# Patient Record
Sex: MALE | Race: WHITE | Employment: FULL TIME | URBAN - METROPOLITAN AREA
[De-identification: names, ages, dates, MRNs, and addresses within clinical notes are randomized per-mention and may not be internally consistent; named-entity substitution may affect disease eponyms.]

---

## 2017-05-31 ENCOUNTER — HOSPITAL ENCOUNTER (EMERGENCY)
Age: 22
Discharge: HOME | End: 2017-05-31
Payer: COMMERCIAL

## 2017-05-31 VITALS
WEIGHT: 193.5 LBS | TEMPERATURE: 98.6 F | SYSTOLIC BLOOD PRESSURE: 154 MMHG | OXYGEN SATURATION: 98 % | DIASTOLIC BLOOD PRESSURE: 87 MMHG | HEIGHT: 67 IN | RESPIRATION RATE: 16 BRPM | BODY MASS INDEX: 30.37 KG/M2 | HEART RATE: 80 BPM
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
## 2017-05-31 DIAGNOSIS — M65.9 TENOSYNOVITIS OF WRIST: Primary | ICD-10-CM

## 2017-05-31 PROCEDURE — 99282 EMERGENCY DEPT VISIT SF MDM: CPT

## 2017-05-31 PROCEDURE — 6370000000 HC RX 637 (ALT 250 FOR IP): Performed by: NURSE PRACTITIONER

## 2017-05-31 RX ORDER — ACETAMINOPHEN 325 MG/1
650 TABLET ORAL EVERY 6 HOURS PRN
Qty: 20 TABLET | Refills: 0 | Status: SHIPPED | OUTPATIENT
Start: 2017-05-31 | End: 2020-10-29 | Stop reason: SDUPTHER

## 2017-05-31 RX ORDER — IBUPROFEN 800 MG/1
800 TABLET ORAL ONCE
Status: COMPLETED | OUTPATIENT
Start: 2017-05-31 | End: 2017-05-31

## 2017-05-31 RX ORDER — IBUPROFEN 800 MG/1
800 TABLET ORAL EVERY 8 HOURS PRN
Qty: 20 TABLET | Refills: 0 | Status: SHIPPED | OUTPATIENT
Start: 2017-05-31 | End: 2017-06-02 | Stop reason: CLARIF

## 2017-05-31 RX ADMIN — IBUPROFEN 800 MG: 800 TABLET, FILM COATED ORAL at 15:58

## 2017-05-31 ASSESSMENT — PAIN SCALES - GENERAL
PAINLEVEL_OUTOF10: 4
PAINLEVEL_OUTOF10: 4

## 2017-05-31 ASSESSMENT — PAIN DESCRIPTION - ORIENTATION: ORIENTATION: RIGHT

## 2017-05-31 ASSESSMENT — PAIN DESCRIPTION - LOCATION: LOCATION: ARM

## 2017-05-31 ASSESSMENT — PAIN DESCRIPTION - DESCRIPTORS: DESCRIPTORS: ACHING

## 2017-05-31 ASSESSMENT — PAIN DESCRIPTION - FREQUENCY: FREQUENCY: CONTINUOUS

## 2017-06-02 ENCOUNTER — APPOINTMENT (OUTPATIENT)
Dept: GENERAL RADIOLOGY | Age: 22
End: 2017-06-02
Payer: COMMERCIAL

## 2017-06-02 ENCOUNTER — HOSPITAL ENCOUNTER (EMERGENCY)
Age: 22
Discharge: HOME | End: 2017-06-02
Payer: COMMERCIAL

## 2017-06-02 VITALS
HEIGHT: 67 IN | HEART RATE: 84 BPM | WEIGHT: 190.92 LBS | TEMPERATURE: 99 F | RESPIRATION RATE: 18 BRPM | DIASTOLIC BLOOD PRESSURE: 86 MMHG | BODY MASS INDEX: 29.97 KG/M2 | SYSTOLIC BLOOD PRESSURE: 157 MMHG | OXYGEN SATURATION: 98 %

## 2017-06-02 DIAGNOSIS — M77.10 LATERAL EPICONDYLITIS  OF ELBOW: Primary | ICD-10-CM

## 2017-06-02 PROCEDURE — 73110 X-RAY EXAM OF WRIST: CPT

## 2017-06-02 PROCEDURE — 73080 X-RAY EXAM OF ELBOW: CPT

## 2017-06-02 PROCEDURE — 99283 EMERGENCY DEPT VISIT LOW MDM: CPT

## 2017-06-02 RX ORDER — NAPROXEN 500 MG/1
500 TABLET ORAL 2 TIMES DAILY WITH MEALS
Qty: 20 TABLET | Refills: 0 | Status: SHIPPED | OUTPATIENT
Start: 2017-06-02

## 2017-06-02 RX ORDER — ACETAMINOPHEN AND CODEINE PHOSPHATE 300; 30 MG/1; MG/1
1-2 TABLET ORAL 3 TIMES DAILY PRN
Qty: 20 TABLET | Refills: 0 | Status: SHIPPED | OUTPATIENT
Start: 2017-06-02 | End: 2018-03-20

## 2017-06-02 ASSESSMENT — PAIN SCALES - GENERAL: PAINLEVEL_OUTOF10: 10

## 2017-06-02 ASSESSMENT — PAIN DESCRIPTION - DESCRIPTORS: DESCRIPTORS: ACHING;THROBBING

## 2017-06-02 ASSESSMENT — PAIN DESCRIPTION - ORIENTATION: ORIENTATION: LEFT;LOWER

## 2017-06-02 ASSESSMENT — PAIN DESCRIPTION - LOCATION: LOCATION: ARM

## 2017-06-02 ASSESSMENT — PAIN DESCRIPTION - PAIN TYPE: TYPE: ACUTE PAIN

## 2017-09-22 ENCOUNTER — OFFICE VISIT (OUTPATIENT)
Dept: FAMILY MEDICINE CLINIC | Age: 22
End: 2017-09-22
Payer: COMMERCIAL

## 2017-09-22 VITALS
HEIGHT: 67 IN | HEART RATE: 88 BPM | WEIGHT: 198 LBS | RESPIRATION RATE: 16 BRPM | SYSTOLIC BLOOD PRESSURE: 152 MMHG | DIASTOLIC BLOOD PRESSURE: 91 MMHG | BODY MASS INDEX: 31.08 KG/M2

## 2017-09-22 DIAGNOSIS — I10 ESSENTIAL HYPERTENSION: ICD-10-CM

## 2017-09-22 DIAGNOSIS — Z00.00 WELL ADULT EXAM: ICD-10-CM

## 2017-09-22 DIAGNOSIS — R51.9 NEW ONSET HEADACHE: Primary | ICD-10-CM

## 2017-09-22 PROCEDURE — 99214 OFFICE O/P EST MOD 30 MIN: CPT | Performed by: FAMILY MEDICINE

## 2017-09-22 RX ORDER — BUTALBITAL AND ACETAMINOPHEN 25; 325 MG/1; MG/1
1 TABLET ORAL EVERY 6 HOURS PRN
Qty: 20 TABLET | Refills: 0 | Status: SHIPPED | OUTPATIENT
Start: 2017-09-22

## 2017-09-22 RX ORDER — VERAPAMIL HYDROCHLORIDE 180 MG/1
180 CAPSULE, EXTENDED RELEASE ORAL NIGHTLY
Qty: 30 CAPSULE | Refills: 0 | Status: SHIPPED | OUTPATIENT
Start: 2017-09-22 | End: 2017-10-25 | Stop reason: SDUPTHER

## 2017-09-22 ASSESSMENT — PATIENT HEALTH QUESTIONNAIRE - PHQ9
2. FEELING DOWN, DEPRESSED OR HOPELESS: 0
SUM OF ALL RESPONSES TO PHQ9 QUESTIONS 1 & 2: 0
SUM OF ALL RESPONSES TO PHQ QUESTIONS 1-9: 0
1. LITTLE INTEREST OR PLEASURE IN DOING THINGS: 0

## 2017-10-11 ENCOUNTER — HOSPITAL ENCOUNTER (OUTPATIENT)
Age: 22
Setting detail: SPECIMEN
Discharge: HOME | End: 2017-10-11
Payer: COMMERCIAL

## 2017-10-11 DIAGNOSIS — I10 ESSENTIAL HYPERTENSION: ICD-10-CM

## 2017-10-11 DIAGNOSIS — R51.9 NEW ONSET HEADACHE: ICD-10-CM

## 2017-10-11 DIAGNOSIS — Z00.00 WELL ADULT EXAM: ICD-10-CM

## 2017-10-11 LAB
ABSOLUTE EOS #: 0.2 K/UL (ref 0–0.4)
ABSOLUTE LYMPH #: 2.3 K/UL (ref 1.2–5.2)
ABSOLUTE MONO #: 0.3 K/UL (ref 0.1–1.4)
ALBUMIN SERPL-MCNC: 4.4 G/DL (ref 3.5–5.2)
ALBUMIN/GLOBULIN RATIO: 1.7 (ref 1–2.5)
ALP BLD-CCNC: 114 U/L (ref 40–129)
ALT SERPL-CCNC: 43 U/L (ref 5–41)
ANION GAP SERPL CALCULATED.3IONS-SCNC: 13 MMOL/L (ref 9–17)
AST SERPL-CCNC: 21 U/L
BASOPHILS # BLD: 1 %
BASOPHILS ABSOLUTE: 0 K/UL (ref 0–0.2)
BILIRUB SERPL-MCNC: 0.69 MG/DL (ref 0.3–1.2)
BUN BLDV-MCNC: 14 MG/DL (ref 6–20)
BUN/CREAT BLD: ABNORMAL (ref 9–20)
CALCIUM SERPL-MCNC: 8.7 MG/DL (ref 8.6–10.4)
CHLORIDE BLD-SCNC: 101 MMOL/L (ref 98–107)
CHOLESTEROL/HDL RATIO: 4.9
CHOLESTEROL: 196 MG/DL
CO2: 25 MMOL/L (ref 20–31)
CREAT SERPL-MCNC: 0.58 MG/DL (ref 0.7–1.2)
DIFFERENTIAL TYPE: NORMAL
EOSINOPHILS RELATIVE PERCENT: 3 %
GFR AFRICAN AMERICAN: >60 ML/MIN
GFR NON-AFRICAN AMERICAN: >60 ML/MIN
GFR SERPL CREATININE-BSD FRML MDRD: ABNORMAL ML/MIN/{1.73_M2}
GFR SERPL CREATININE-BSD FRML MDRD: ABNORMAL ML/MIN/{1.73_M2}
GLUCOSE BLD-MCNC: 82 MG/DL (ref 70–99)
HCT VFR BLD CALC: 48.1 % (ref 41–53)
HDLC SERPL-MCNC: 40 MG/DL
HEMOGLOBIN: 16.4 G/DL (ref 13.5–17.5)
LDL CHOLESTEROL: 128 MG/DL (ref 0–130)
LYMPHOCYTES # BLD: 31 %
MCH RBC QN AUTO: 30.8 PG (ref 26–34)
MCHC RBC AUTO-ENTMCNC: 34.1 G/DL (ref 31–37)
MCV RBC AUTO: 90.4 FL (ref 80–100)
MONOCYTES # BLD: 5 %
PDW BLD-RTO: 13.1 % (ref 12.5–15.4)
PLATELET # BLD: 207 K/UL (ref 140–450)
PLATELET ESTIMATE: NORMAL
PMV BLD AUTO: 8.3 FL (ref 6–12)
POTASSIUM SERPL-SCNC: 4 MMOL/L (ref 3.7–5.3)
RBC # BLD: 5.32 M/UL (ref 4.5–5.9)
RBC # BLD: NORMAL 10*6/UL
SEG NEUTROPHILS: 60 %
SEGMENTED NEUTROPHILS ABSOLUTE COUNT: 4.5 K/UL (ref 1.8–8)
SODIUM BLD-SCNC: 139 MMOL/L (ref 135–144)
THYROXINE, FREE: 1.22 NG/DL (ref 0.93–1.7)
TOTAL PROTEIN: 7 G/DL (ref 6.4–8.3)
TRIGL SERPL-MCNC: 142 MG/DL
TSH SERPL DL<=0.05 MIU/L-ACNC: 3.04 MIU/L (ref 0.3–5)
VLDLC SERPL CALC-MCNC: ABNORMAL MG/DL (ref 1–30)
WBC # BLD: 7.5 K/UL (ref 4.5–13.5)
WBC # BLD: NORMAL 10*3/UL

## 2017-10-13 LAB — THYROID PEROXIDASE (TPO) AB: <10 IU/ML (ref 0–35)

## 2017-10-16 DIAGNOSIS — R51.9 NEW ONSET HEADACHE: ICD-10-CM

## 2018-01-03 ENCOUNTER — TELEPHONE (OUTPATIENT)
Dept: FAMILY MEDICINE CLINIC | Age: 23
End: 2018-01-03

## 2018-01-03 DIAGNOSIS — I10 ESSENTIAL HYPERTENSION: ICD-10-CM

## 2018-01-03 RX ORDER — VERAPAMIL HYDROCHLORIDE 180 MG/1
CAPSULE, EXTENDED RELEASE ORAL
Qty: 30 CAPSULE | Refills: 5 | Status: SHIPPED | OUTPATIENT
Start: 2018-01-03 | End: 2018-03-20 | Stop reason: SDUPTHER

## 2018-03-20 ENCOUNTER — OFFICE VISIT (OUTPATIENT)
Dept: FAMILY MEDICINE CLINIC | Age: 23
End: 2018-03-20
Payer: COMMERCIAL

## 2018-03-20 VITALS
DIASTOLIC BLOOD PRESSURE: 90 MMHG | HEART RATE: 76 BPM | SYSTOLIC BLOOD PRESSURE: 136 MMHG | HEIGHT: 67 IN | RESPIRATION RATE: 16 BRPM | WEIGHT: 199 LBS | BODY MASS INDEX: 31.23 KG/M2

## 2018-03-20 DIAGNOSIS — G43.011 INTRACTABLE MIGRAINE WITHOUT AURA AND WITH STATUS MIGRAINOSUS: ICD-10-CM

## 2018-03-20 DIAGNOSIS — I10 ESSENTIAL HYPERTENSION: Primary | ICD-10-CM

## 2018-03-20 PROCEDURE — 99213 OFFICE O/P EST LOW 20 MIN: CPT | Performed by: FAMILY MEDICINE

## 2018-03-20 RX ORDER — SUMATRIPTAN 100 MG/1
100 TABLET, FILM COATED ORAL
Qty: 9 TABLET | Refills: 3 | Status: SHIPPED | OUTPATIENT
Start: 2018-03-20 | End: 2020-10-14 | Stop reason: SDUPTHER

## 2018-03-20 RX ORDER — VERAPAMIL HYDROCHLORIDE 240 MG/1
CAPSULE, EXTENDED RELEASE ORAL
Qty: 30 CAPSULE | Refills: 5 | Status: SHIPPED | OUTPATIENT
Start: 2018-03-20 | End: 2020-12-30 | Stop reason: SDUPTHER

## 2018-03-20 ASSESSMENT — PATIENT HEALTH QUESTIONNAIRE - PHQ9
1. LITTLE INTEREST OR PLEASURE IN DOING THINGS: 0
2. FEELING DOWN, DEPRESSED OR HOPELESS: 0
SUM OF ALL RESPONSES TO PHQ9 QUESTIONS 1 & 2: 0
SUM OF ALL RESPONSES TO PHQ QUESTIONS 1-9: 0

## 2018-04-24 ENCOUNTER — OFFICE VISIT (OUTPATIENT)
Dept: FAMILY MEDICINE CLINIC | Age: 23
End: 2018-04-24
Payer: COMMERCIAL

## 2018-04-24 VITALS
HEIGHT: 68 IN | RESPIRATION RATE: 16 BRPM | TEMPERATURE: 98.1 F | DIASTOLIC BLOOD PRESSURE: 83 MMHG | BODY MASS INDEX: 30.62 KG/M2 | HEART RATE: 72 BPM | WEIGHT: 202 LBS | SYSTOLIC BLOOD PRESSURE: 135 MMHG

## 2018-04-24 DIAGNOSIS — J06.9 ACUTE URI: Primary | ICD-10-CM

## 2018-04-24 PROCEDURE — 99213 OFFICE O/P EST LOW 20 MIN: CPT | Performed by: FAMILY MEDICINE

## 2018-04-24 RX ORDER — AZITHROMYCIN 250 MG/1
TABLET, FILM COATED ORAL
Qty: 1 PACKET | Refills: 0 | Status: SHIPPED | OUTPATIENT
Start: 2018-04-24

## 2018-04-24 RX ORDER — GUAIFENESIN AND CODEINE PHOSPHATE 100; 10 MG/5ML; MG/5ML
10 SOLUTION ORAL 4 TIMES DAILY PRN
Qty: 400 ML | Refills: 0 | Status: SHIPPED | OUTPATIENT
Start: 2018-04-24 | End: 2018-05-04

## 2018-04-24 ASSESSMENT — PATIENT HEALTH QUESTIONNAIRE - PHQ9
SUM OF ALL RESPONSES TO PHQ9 QUESTIONS 1 & 2: 0
1. LITTLE INTEREST OR PLEASURE IN DOING THINGS: 0
2. FEELING DOWN, DEPRESSED OR HOPELESS: 0
SUM OF ALL RESPONSES TO PHQ QUESTIONS 1-9: 0

## 2018-07-26 ENCOUNTER — OFFICE VISIT (OUTPATIENT)
Dept: FAMILY MEDICINE CLINIC | Age: 23
End: 2018-07-26
Payer: COMMERCIAL

## 2018-07-26 VITALS
SYSTOLIC BLOOD PRESSURE: 125 MMHG | HEIGHT: 68 IN | BODY MASS INDEX: 30.31 KG/M2 | WEIGHT: 200 LBS | HEART RATE: 70 BPM | DIASTOLIC BLOOD PRESSURE: 77 MMHG | RESPIRATION RATE: 16 BRPM

## 2018-07-26 DIAGNOSIS — L50.9 HIVES: Primary | ICD-10-CM

## 2018-07-26 PROCEDURE — 99213 OFFICE O/P EST LOW 20 MIN: CPT | Performed by: FAMILY MEDICINE

## 2018-07-26 RX ORDER — LEVOCETIRIZINE DIHYDROCHLORIDE 5 MG/1
5 TABLET, FILM COATED ORAL DAILY
Qty: 30 TABLET | Refills: 5 | Status: SHIPPED | OUTPATIENT
Start: 2018-07-26 | End: 2019-02-04 | Stop reason: SDUPTHER

## 2018-07-26 RX ORDER — METHYLPREDNISOLONE 4 MG/1
TABLET ORAL
Qty: 1 KIT | Refills: 0 | Status: SHIPPED | OUTPATIENT
Start: 2018-07-26 | End: 2018-08-01

## 2018-07-26 ASSESSMENT — PATIENT HEALTH QUESTIONNAIRE - PHQ9
SUM OF ALL RESPONSES TO PHQ9 QUESTIONS 1 & 2: 0
2. FEELING DOWN, DEPRESSED OR HOPELESS: 0
SUM OF ALL RESPONSES TO PHQ QUESTIONS 1-9: 0
1. LITTLE INTEREST OR PLEASURE IN DOING THINGS: 0

## 2018-07-26 NOTE — PROGRESS NOTES
[facility] PHYSICIANS  COMPREHENSIVE CARE  [facility] [place]  [address] [address]  Dept: [phone]      Sarah Julien is a 26 y.o. male who presents today for follow up on his  medical conditions as noted below. Chief Complaint   Patient presents with    Urticaria     states he has been breaking out in hives randomly throughout the day x's 10 days.  Sinus Problem     c/o sinus pressure. There is no problem list on file for this patient. Past Medical History:   Diagnosis Date    Hay fever     Insomnia       No past surgical history on file. Family History   Problem Relation Age of Onset    Thyroid Disease Mother     High Cholesterol Father     High Blood Pressure Father        Current Outpatient Prescriptions   Medication Sig Dispense Refill    methylPREDNISolone (MEDROL DOSEPACK) 4 MG tablet Take by mouth. 1 kit 0    levocetirizine (XYZAL) 5 MG tablet Take 1 tablet by mouth daily 30 tablet 5    azithromycin (ZITHROMAX) 250 MG tablet 2 po qd day 1,then 1 po qd for 4 days 1 packet 0    verapamil (VERELAN) 240 MG extended release capsule TAKE ONE CAPSULE BY MOUTH NIGHTLY. 30 capsule 5    ALLZITAL  MG TABS Take 1 tablet by mouth every 6 hours as needed (prn headache) 20 tablet 0    naproxen (NAPROSYN) 500 MG tablet Take 1 tablet by mouth 2 times daily (with meals) 20 tablet 0    acetaminophen (TYLENOL) 325 MG tablet Take 2 tablets by mouth every 6 hours as needed for Pain 20 tablet 0    SUMAtriptan (IMITREX) 100 MG tablet Take 1 tablet by mouth once as needed for Migraine 9 tablet 3     No current facility-administered medications for this visit.       ALLERGIES:  No Known Allergies    Social History   Substance Use Topics    Smoking status: Never Smoker    Smokeless tobacco: Never Used    Alcohol use No        LDL Cholesterol (mg/dL)   Date Value   10/11/2017 128     HDL (mg/dL)   Date Value   10/11/2017 40 (L)     BUN (mg/dL)   Date Value   10/11/2017 14 mucosal edema. Mouth/Throat: Oropharynx is clear and moist. No posterior oropharyngeal erythema. Eyes: Conjunctivae and EOM are normal. Pupils are equal, round, and reactive to light. Neck: Normal range of motion. Neck supple. No thyromegaly present. Cardiovascular: Normal rate, regular rhythm and normal heart sounds. No murmur heard. Pulmonary/Chest: Effort normal and breath sounds normal. He has no wheezes. Hehas no rales. Abdominal: Soft. Bowel sounds are normal. He exhibits no distension and no mass. There is no tenderness. There is no rebound and no guarding. Genitourinary/Anorectal:deferred  Musculoskeletal: Normal range of motion. He exhibits no edema or tenderness. Lymphadenopathy: He has no cervical adenopathy. Neurological: He is alert and oriented to person, place, and time. He has normal reflexes. Skin: Skin is warm and dry. No rash noted. Psychiatric: He has a normal mood and affect. His   behavior is normal.       Assessment:      1. Hives          Plan:      Call or return to clinic prn if these symptoms worsen or fail to improve as anticipated. I have reviewed the instructions with the patient, answering all questions to his satisfaction. No Follow-up on file. No orders of the defined types were placed in this encounter. Orders Placed This Encounter   Medications    methylPREDNISolone (MEDROL DOSEPACK) 4 MG tablet     Sig: Take by mouth.      Dispense:  1 kit     Refill:  0    levocetirizine (XYZAL) 5 MG tablet     Sig: Take 1 tablet by mouth daily     Dispense:  30 tablet     Refill:  5      fu if not resolving  Electronically signed by Mat Mclaughlin, DO on 7/26/2018 at 4:02 PM

## 2018-09-27 RX ORDER — VERAPAMIL HYDROCHLORIDE 240 MG/1
TABLET, FILM COATED, EXTENDED RELEASE ORAL
Qty: 30 TABLET | Refills: 1 | Status: SHIPPED | OUTPATIENT
Start: 2018-09-27 | End: 2018-11-05 | Stop reason: SDUPTHER

## 2018-11-05 ENCOUNTER — OFFICE VISIT (OUTPATIENT)
Dept: FAMILY MEDICINE CLINIC | Age: 23
End: 2018-11-05
Payer: COMMERCIAL

## 2018-11-05 VITALS
RESPIRATION RATE: 16 BRPM | WEIGHT: 205 LBS | HEIGHT: 68 IN | DIASTOLIC BLOOD PRESSURE: 83 MMHG | HEART RATE: 74 BPM | BODY MASS INDEX: 31.07 KG/M2 | OXYGEN SATURATION: 95 % | SYSTOLIC BLOOD PRESSURE: 132 MMHG

## 2018-11-05 DIAGNOSIS — I10 ESSENTIAL HYPERTENSION: ICD-10-CM

## 2018-11-05 DIAGNOSIS — M94.0 COSTOCHONDRITIS: Primary | ICD-10-CM

## 2018-11-05 PROCEDURE — 99213 OFFICE O/P EST LOW 20 MIN: CPT | Performed by: FAMILY MEDICINE

## 2018-11-05 RX ORDER — VERAPAMIL HYDROCHLORIDE 240 MG/1
TABLET, FILM COATED, EXTENDED RELEASE ORAL
Qty: 30 TABLET | Refills: 5 | Status: SHIPPED | OUTPATIENT
Start: 2018-11-05 | End: 2021-01-13 | Stop reason: SDUPTHER

## 2018-11-05 ASSESSMENT — PATIENT HEALTH QUESTIONNAIRE - PHQ9
SUM OF ALL RESPONSES TO PHQ QUESTIONS 1-9: 0
2. FEELING DOWN, DEPRESSED OR HOPELESS: 0
1. LITTLE INTEREST OR PLEASURE IN DOING THINGS: 0
SUM OF ALL RESPONSES TO PHQ QUESTIONS 1-9: 0
SUM OF ALL RESPONSES TO PHQ9 QUESTIONS 1 & 2: 0

## 2018-11-05 NOTE — PROGRESS NOTES
[address]  [facility] [address] [facility] [address]  Dept: [phone]      Pasha Raya is a 26 y.o. male who presents today for follow up on his  medical conditions as noted below. Chief Complaint   Patient presents with    Chest Pain     since saturday. pt describes it as \"feels hollow\"       There is no problem list on file for this patient. Past Medical History:   Diagnosis Date    Hay fever     Insomnia       No past surgical history on file. Family History   Problem Relation Age of Onset    Thyroid Disease Mother     High Cholesterol Father     High Blood Pressure Father        Current Outpatient Prescriptions   Medication Sig Dispense Refill    verapamil (CALAN SR) 240 MG extended release tablet TAKE 1 TABLET BY MOUTH AT BEDTIME 30 tablet 5    levocetirizine (XYZAL) 5 MG tablet Take 1 tablet by mouth daily 30 tablet 5    azithromycin (ZITHROMAX) 250 MG tablet 2 po qd day 1,then 1 po qd for 4 days 1 packet 0    verapamil (VERELAN) 240 MG extended release capsule TAKE ONE CAPSULE BY MOUTH NIGHTLY. 30 capsule 5    SUMAtriptan (IMITREX) 100 MG tablet Take 1 tablet by mouth once as needed for Migraine 9 tablet 3    ALLZITAL  MG TABS Take 1 tablet by mouth every 6 hours as needed (prn headache) 20 tablet 0    naproxen (NAPROSYN) 500 MG tablet Take 1 tablet by mouth 2 times daily (with meals) 20 tablet 0    acetaminophen (TYLENOL) 325 MG tablet Take 2 tablets by mouth every 6 hours as needed for Pain 20 tablet 0     No current facility-administered medications for this visit.       ALLERGIES:  No Known Allergies    Social History   Substance Use Topics    Smoking status: Never Smoker    Smokeless tobacco: Never Used    Alcohol use No        LDL Cholesterol (mg/dL)   Date Value   10/11/2017 128     HDL (mg/dL)   Date Value   10/11/2017 40 (L)     BUN (mg/dL)   Date Value   10/11/2017 14     CREATININE (mg/dL)   Date Value   10/11/2017 0.58 (L)     Glucose (mg/dL)   Date Value   10/11/2017 82              Subjective:      HPI  He is here today complaining of having some mild chest discomfort going on since last day or 2 with no shortness of breath or other symptomatology associated with it    Review of Systems:     Constitutional: Negative for fever, appetite change and fatigue. Family social and medical history reviewed and unchanged     HENT: Negative. Negative for nosebleeds, trouble swallowing and neck pain. Eyes: Negative for photophobia and visual disturbance. Respiratory: Negative. Negative for chest tightness and shortness of breath. Cardiovascular: Negative. Negative for chest pain and leg swelling. Gastrointestinal: Negative. Negative for abdominal pain and blood in stool. Endocrine: Negative for cold intolerance and polyuria. Genitourinary: Negative for dysuria and hematuria. Musculoskeletal: Negative. Skin: Negative for rash. Allergic/Immunologic: Negative. Neurological: Negative. Negative for dizziness, weakness and numbness. Hematological: Negative. Negative for adenopathy. Does not bruise/bleed easily. Psychiatric/Behavioral: Negative for sleep disturbance, dysphoric mood and  decreased concentration. The patient is not nervous/anxious. Objective:     Physical Exam:     Nursing note and vitals reviewed. /83   Pulse 74   Resp 16   Ht 5' 8.11\" (1.73 m)   Wt 205 lb (93 kg)   SpO2 95%   BMI 31.07 kg/m²   Constitutional: He is oriented to person, place, and time. He   appears well-developed and well-nourished. HENT:   Head: Normocephalic and atraumatic. Right Ear: External ear normal. Tympanic membrane is not erythematous. No middle ear effusion. Left Ear: External ear normal. Tympanic membrane is not erythematous. No middle ear effusion. Nose: No mucosal edema. Mouth/Throat: Oropharynx is clear and moist. No posterior oropharyngeal erythema.     Eyes: Conjunctivae and EOM are normal. Pupils are equal, round, and reactive to light. Neck: Normal range of motion. Neck supple. No thyromegaly present. Cardiovascular: Normal rate, regular rhythm and normal heart sounds. No murmur heard. Pulmonary/Chest: Effort normal and breath sounds normal. He has no wheezes. Hehas no rales. reproducible to palpation  Abdominal: Soft. Bowel sounds are normal. He exhibits no distension and no mass. There is no tenderness. There is no rebound and no guarding. Genitourinary/Anorectal:deferred  Musculoskeletal: Normal range of motion. He exhibits no edema or tenderness. Lymphadenopathy: He has no cervical adenopathy. Neurological: He is alert and oriented to person, place, and time. He has normal reflexes. Skin: Skin is warm and dry. No rash noted. Psychiatric: He has a normal mood and affect. His   behavior is normal.       Assessment:      1. Costochondritis    2. Essential hypertension          Plan:      Call or return to clinic prn if these symptoms worsen or fail to improve as anticipated. I have reviewed the instructions with the patient, answering all questions to his satisfaction. No Follow-up on file. No orders of the defined types were placed in this encounter.     Orders Placed This Encounter   Medications    verapamil (CALAN SR) 240 MG extended release tablet     Sig: TAKE 1 TABLET BY MOUTH AT BEDTIME     Dispense:  30 tablet     Refill:  5     Ibuprofen  Electronically signed by Emmy Park DO on 11/5/2018 at 5:05 PM

## 2019-02-04 DIAGNOSIS — L50.9 HIVES: ICD-10-CM

## 2019-02-05 RX ORDER — LEVOCETIRIZINE DIHYDROCHLORIDE 5 MG/1
TABLET, FILM COATED ORAL
Qty: 30 TABLET | Refills: 4 | Status: SHIPPED | OUTPATIENT
Start: 2019-02-05 | End: 2019-10-01 | Stop reason: SDUPTHER

## 2019-04-25 ENCOUNTER — OFFICE VISIT (OUTPATIENT)
Dept: FAMILY MEDICINE CLINIC | Age: 24
End: 2019-04-25
Payer: COMMERCIAL

## 2019-04-25 VITALS
SYSTOLIC BLOOD PRESSURE: 142 MMHG | HEART RATE: 91 BPM | HEIGHT: 68 IN | DIASTOLIC BLOOD PRESSURE: 86 MMHG | RESPIRATION RATE: 16 BRPM | WEIGHT: 210 LBS | BODY MASS INDEX: 31.83 KG/M2

## 2019-04-25 DIAGNOSIS — I10 ESSENTIAL HYPERTENSION: Primary | ICD-10-CM

## 2019-04-25 DIAGNOSIS — J30.1 SEASONAL ALLERGIC RHINITIS DUE TO POLLEN: ICD-10-CM

## 2019-04-25 PROCEDURE — 99213 OFFICE O/P EST LOW 20 MIN: CPT | Performed by: FAMILY MEDICINE

## 2019-04-25 RX ORDER — VERAPAMIL HYDROCHLORIDE 240 MG/1
TABLET, FILM COATED, EXTENDED RELEASE ORAL
Qty: 30 TABLET | Refills: 5 | Status: SHIPPED | OUTPATIENT
Start: 2019-04-25 | End: 2019-12-01 | Stop reason: SDUPTHER

## 2019-04-25 ASSESSMENT — PATIENT HEALTH QUESTIONNAIRE - PHQ9
1. LITTLE INTEREST OR PLEASURE IN DOING THINGS: 1
2. FEELING DOWN, DEPRESSED OR HOPELESS: 1
SUM OF ALL RESPONSES TO PHQ9 QUESTIONS 1 & 2: 2
SUM OF ALL RESPONSES TO PHQ QUESTIONS 1-9: 2
SUM OF ALL RESPONSES TO PHQ QUESTIONS 1-9: 2

## 2019-04-25 NOTE — PROGRESS NOTES
[facility]  [address] [place] [id number]  Dept: [phone]      Urbano Smith is a 26 y.o. male who presents today for follow up on his  medical conditions as noted below. Chief Complaint   Patient presents with    Hypertension     he is taking his medications. he states he would qualify for [employer] benefits if he was diagnosed with HTN 12 months after leaving the [employer]. march 28, 2016. about 18months after leaving he was dx with htn. There is no problem list on file for this patient. Past Medical History:   Diagnosis Date    Hay fever     Insomnia       No past surgical history on file. Family History   Problem Relation Age of Onset    Thyroid Disease Mother     High Cholesterol Father     High Blood Pressure Father        Current Outpatient Medications   Medication Sig Dispense Refill    verapamil (CALAN SR) 240 MG extended release tablet TAKE 1 TABLET BY MOUTH EVERYDAY AT BEDTIME 30 tablet 5    levocetirizine (XYZAL) 5 MG tablet TAKE ONE TABLET BY MOUTH DAILY 30 tablet 4    verapamil (CALAN SR) 240 MG extended release tablet TAKE 1 TABLET BY MOUTH AT BEDTIME 30 tablet 5    verapamil (VERELAN) 240 MG extended release capsule TAKE ONE CAPSULE BY MOUTH NIGHTLY. 30 capsule 5    azithromycin (ZITHROMAX) 250 MG tablet 2 po qd day 1,then 1 po qd for 4 days 1 packet 0    SUMAtriptan (IMITREX) 100 MG tablet Take 1 tablet by mouth once as needed for Migraine 9 tablet 3    ALLZITAL  MG TABS Take 1 tablet by mouth every 6 hours as needed (prn headache) 20 tablet 0    naproxen (NAPROSYN) 500 MG tablet Take 1 tablet by mouth 2 times daily (with meals) 20 tablet 0    acetaminophen (TYLENOL) 325 MG tablet Take 2 tablets by mouth every 6 hours as needed for Pain 20 tablet 0     No current facility-administered medications for this visit.       ALLERGIES:  No Known Allergies    Social History     Tobacco Use    Smoking status: Never Smoker    Smokeless tobacco: Never Used   Substance Use Topics    Alcohol use: No        LDL Cholesterol (mg/dL)   Date Value   10/11/2017 128     HDL (mg/dL)   Date Value   10/11/2017 40 (L)     BUN (mg/dL)   Date Value   10/11/2017 14     CREATININE (mg/dL)   Date Value   10/11/2017 0.58 (L)     Glucose (mg/dL)   Date Value   10/11/2017 82              Subjective:      HPI  Here today for follow-up on his high blood pressure he was in the [employer] and his last day in the [employer] was March 26, 2016 he has found out that he qualifies for benefits if he developed the high blood pressure 1 year after leaving the [employer]. He states he has been feeling good as far as his headaches have been concerned since being on blood pressure medication and really hasn't had any his blood pressure is up a little bit today but he rather stressed today in regards to document the situation as well as its finals week  He also is having some frontal pressure and allergy problems he is using some over-the-counter Flonase but has not taken an antihistamine    Review of Systems:     Constitutional: Negative for fever, appetite change and fatigue. Family social and medical history reviewed and unchanged     HENT: Negative. Negative for nosebleeds, trouble swallowing and neck pain. Eyes: Negative for photophobia and visual disturbance. Respiratory: Negative. Negative for chest tightness and shortness of breath. Cardiovascular: Negative. Negative for chest pain and leg swelling. Gastrointestinal: Negative. Negative for abdominal pain and blood in stool. Endocrine: Negative for cold intolerance and polyuria. Genitourinary: Negative for dysuria and hematuria. Musculoskeletal: Negative. Skin: Negative for rash. Allergic/Immunologic: Negative. Neurological: Negative. Negative for dizziness, weakness and numbness. Hematological: Negative. Negative for adenopathy.  Does not bruise/bleed satisfaction. No follow-ups on file. No orders of the defined types were placed in this encounter.     Orders Placed This Encounter   Medications    verapamil (CALAN SR) 240 MG extended release tablet     Sig: TAKE 1 TABLET BY MOUTH EVERYDAY AT BEDTIME     Dispense:  30 tablet     Refill:  5     Seen on current dose of blood pressure medicine at this time try over-the-counter Zyrtec or Claritin follow-up will recheck blood pressure and if he needs a form completed for  I be happy to do so  Electronically signed by Jono Del Castillo DO on 4/25/2019 at 10:55 AM

## 2019-08-07 ENCOUNTER — OFFICE VISIT (OUTPATIENT)
Dept: FAMILY MEDICINE CLINIC | Age: 24
End: 2019-08-07
Payer: COMMERCIAL

## 2019-08-07 VITALS
BODY MASS INDEX: 31.28 KG/M2 | SYSTOLIC BLOOD PRESSURE: 133 MMHG | WEIGHT: 206.4 LBS | HEIGHT: 68 IN | DIASTOLIC BLOOD PRESSURE: 80 MMHG | HEART RATE: 92 BPM | OXYGEN SATURATION: 100 %

## 2019-08-07 DIAGNOSIS — J30.1 SEASONAL ALLERGIC RHINITIS DUE TO POLLEN: Primary | ICD-10-CM

## 2019-08-07 PROCEDURE — 99213 OFFICE O/P EST LOW 20 MIN: CPT | Performed by: FAMILY MEDICINE

## 2019-08-07 PROCEDURE — 96372 THER/PROPH/DIAG INJ SC/IM: CPT | Performed by: FAMILY MEDICINE

## 2019-08-07 RX ORDER — LORATADINE 10 MG/1
10 CAPSULE, LIQUID FILLED ORAL DAILY PRN
COMMUNITY

## 2019-08-07 RX ORDER — METHYLPREDNISOLONE ACETATE 80 MG/ML
80 INJECTION, SUSPENSION INTRA-ARTICULAR; INTRALESIONAL; INTRAMUSCULAR; SOFT TISSUE ONCE
Status: COMPLETED | OUTPATIENT
Start: 2019-08-07 | End: 2019-08-07

## 2019-08-07 RX ADMIN — METHYLPREDNISOLONE ACETATE 80 MG: 80 INJECTION, SUSPENSION INTRA-ARTICULAR; INTRALESIONAL; INTRAMUSCULAR; SOFT TISSUE at 16:21

## 2019-08-07 ASSESSMENT — PATIENT HEALTH QUESTIONNAIRE - PHQ9
1. LITTLE INTEREST OR PLEASURE IN DOING THINGS: 0
2. FEELING DOWN, DEPRESSED OR HOPELESS: 0
SUM OF ALL RESPONSES TO PHQ QUESTIONS 1-9: 0
SUM OF ALL RESPONSES TO PHQ9 QUESTIONS 1 & 2: 0
SUM OF ALL RESPONSES TO PHQ QUESTIONS 1-9: 0

## 2019-08-07 NOTE — PROGRESS NOTES
Last Rate Last Dose    methylPREDNISolone acetate (DEPO-MEDROL) injection 80 mg  80 mg Intramuscular Once Carolyn Hardin,          ALLERGIES:  No Known Allergies    Social History     Tobacco Use    Smoking status: Never Smoker    Smokeless tobacco: Never Used   Substance Use Topics    Alcohol use: No        LDL Cholesterol (mg/dL)   Date Value   10/11/2017 128     HDL (mg/dL)   Date Value   10/11/2017 40 (L)     BUN (mg/dL)   Date Value   10/11/2017 14     CREATININE (mg/dL)   Date Value   10/11/2017 0.58 (L)     Glucose (mg/dL)   Date Value   10/11/2017 82              Subjective:      HPI  He is here today stating having problems with his allergies lots of sinus pressure he has been taking Claritin is not helping a whole lot usually in the past a shot of steroids improves of symptoms    Review of Systems:     Constitutional: Negative for fever, appetite change and fatigue. Family social and medical history reviewed and unchanged     HENT: Negative. Negative for nosebleeds, trouble swallowing and neck pain. Eyes: Negative for photophobia and visual disturbance. Respiratory: Negative. Negative for chest tightness and shortness of breath. Cardiovascular: Negative. Negative for chest pain and leg swelling. Gastrointestinal: Negative. Negative for abdominal pain and blood in stool. Endocrine: Negative for cold intolerance and polyuria. Genitourinary: Negative for dysuria and hematuria. Musculoskeletal: Negative. Skin: Negative for rash. Allergic/Immunologic: Negative. Neurological: Negative. Negative for dizziness, weakness and numbness. Hematological: Negative. Negative for adenopathy. Does not bruise/bleed easily. Psychiatric/Behavioral: Negative for sleep disturbance, dysphoric mood and  decreased concentration. The patient is not nervous/anxious. Objective:     Physical Exam:     Nursing note and vitals reviewed.   /80   Pulse 92   Ht 5' 8\" (1.727 m)

## 2019-10-01 DIAGNOSIS — L50.9 HIVES: ICD-10-CM

## 2019-10-01 RX ORDER — LEVOCETIRIZINE DIHYDROCHLORIDE 5 MG/1
TABLET, FILM COATED ORAL
Qty: 90 TABLET | Refills: 1 | Status: SHIPPED | OUTPATIENT
Start: 2019-10-01 | End: 2020-03-25

## 2019-10-23 ENCOUNTER — HOSPITAL ENCOUNTER (OUTPATIENT)
Age: 24
Discharge: HOME | End: 2019-10-23
Payer: COMMERCIAL

## 2019-10-23 ENCOUNTER — HOSPITAL ENCOUNTER (OUTPATIENT)
Dept: GENERAL RADIOLOGY | Age: 24
Discharge: HOME | End: 2019-10-25
Payer: COMMERCIAL
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
## 2019-10-23 PROCEDURE — 73590 X-RAY EXAM OF LOWER LEG: CPT

## 2019-12-02 RX ORDER — VERAPAMIL HYDROCHLORIDE 240 MG/1
TABLET, FILM COATED, EXTENDED RELEASE ORAL
Qty: 90 TABLET | Refills: 1 | Status: SHIPPED | OUTPATIENT
Start: 2019-12-02 | End: 2020-06-10

## 2020-01-02 ENCOUNTER — OFFICE VISIT (OUTPATIENT)
Dept: FAMILY MEDICINE CLINIC | Age: 25
End: 2020-01-02
Payer: COMMERCIAL

## 2020-01-02 VITALS
HEART RATE: 90 BPM | DIASTOLIC BLOOD PRESSURE: 85 MMHG | WEIGHT: 209.8 LBS | SYSTOLIC BLOOD PRESSURE: 129 MMHG | OXYGEN SATURATION: 96 % | BODY MASS INDEX: 31.9 KG/M2

## 2020-01-02 PROCEDURE — 99213 OFFICE O/P EST LOW 20 MIN: CPT | Performed by: FAMILY MEDICINE

## 2020-01-02 ASSESSMENT — ENCOUNTER SYMPTOMS
VOMITING: 0
WHEEZING: 0
TROUBLE SWALLOWING: 0
NAUSEA: 0
FACIAL SWELLING: 0
BACK PAIN: 0
SINUS PRESSURE: 0
SORE THROAT: 0
EYE DISCHARGE: 0
PHOTOPHOBIA: 0
SHORTNESS OF BREATH: 0
CHEST TIGHTNESS: 0
DIARRHEA: 0
APNEA: 0
ABDOMINAL DISTENTION: 0
EYE PAIN: 0
ABDOMINAL PAIN: 1
ANAL BLEEDING: 0
CONSTIPATION: 0
COLOR CHANGE: 0

## 2020-01-03 NOTE — PROGRESS NOTES
abdominal distention, anal bleeding, constipation, diarrhea, nausea and vomiting. Endocrine: Negative for cold intolerance, heat intolerance, polydipsia, polyphagia and polyuria. Genitourinary: Negative for difficulty urinating, flank pain, frequency and hematuria. Musculoskeletal: Negative for back pain, gait problem and neck pain. Skin: Negative for color change and rash. Neurological: Negative for dizziness, syncope, facial asymmetry, speech difficulty and light-headedness. Hematological: Negative for adenopathy. Psychiatric/Behavioral: Positive for sleep disturbance. Negative for agitation, behavioral problems, confusion, hallucinations and suicidal ideas. The patient is nervous/anxious. The patient is not hyperactive. Objective:     Physical Exam  Vitals signs and nursing note reviewed. Constitutional:       General: He is not in acute distress. Appearance: He is well-developed. HENT:      Head: Normocephalic. Neck:      Musculoskeletal: Normal range of motion and neck supple. Thyroid: No thyromegaly. Cardiovascular:      Rate and Rhythm: Normal rate and regular rhythm. Heart sounds: Normal heart sounds. No murmur. Pulmonary:      Breath sounds: Normal breath sounds. No wheezing or rales. Chest:      Chest wall: No tenderness. Abdominal:      General: Bowel sounds are normal. There is no distension. Palpations: Abdomen is soft. There is no mass. Tenderness: There is tenderness in the right lower quadrant. There is rebound. There is no guarding. Musculoskeletal: Normal range of motion. Lymphadenopathy:      Cervical: No cervical adenopathy. Skin:     General: Skin is warm. Findings: No rash. Neurological:      Mental Status: He is alert and oriented to person, place, and time. Psychiatric:         Mood and Affect: Mood is depressed.        /85   Pulse 90   Wt 209 lb 12.8 oz (95.2 kg)   SpO2 96%   BMI 31.90 kg/m²     Assessment: Diagnosis Orders   1. Essential hypertension controlled    2. Right lower quadrant abdominal pain Suspected acute appendicitis    3. Overweight (BMI 25.0-29. 9)                   Plan:    ER [facility]  Pt said he can drive himself to ER    Return in about 10 days (around 1/12/2020), or with Dr Sonal Sullivan, for follow up. No orders of the defined types were placed in this encounter. Patient given educational materials - see patient instructions. Discussed use, benefit,and side effects of prescribed medications. All patient questions answered. Pt voiced understanding. Reviewed health maintenance. Instructed to continue current medications, diet and exercise. Patient agreed withtreatment plan. Follow up as directed.      Electronically signed by Ning Ribeiro MD on 1/2/2020 at 7:20 PM

## 2020-01-09 ENCOUNTER — TELEPHONE (OUTPATIENT)
Dept: FAMILY MEDICINE CLINIC | Age: 25
End: 2020-01-09

## 2020-01-09 NOTE — TELEPHONE ENCOUNTER
I called patient to see what records he was referring to, no answer left VM to contact office.  I do see records from recent ER visit on 1/2/2020

## 2020-01-13 ENCOUNTER — OFFICE VISIT (OUTPATIENT)
Dept: FAMILY MEDICINE CLINIC | Age: 25
End: 2020-01-13
Payer: COMMERCIAL

## 2020-01-13 VITALS
BODY MASS INDEX: 31.98 KG/M2 | DIASTOLIC BLOOD PRESSURE: 75 MMHG | HEART RATE: 84 BPM | SYSTOLIC BLOOD PRESSURE: 130 MMHG | OXYGEN SATURATION: 97 % | WEIGHT: 211 LBS | HEIGHT: 68 IN

## 2020-01-13 PROCEDURE — 99214 OFFICE O/P EST MOD 30 MIN: CPT | Performed by: FAMILY MEDICINE

## 2020-01-13 RX ORDER — SULFAMETHOXAZOLE AND TRIMETHOPRIM 800; 160 MG/1; MG/1
1 TABLET ORAL 2 TIMES DAILY
Qty: 20 TABLET | Refills: 0 | Status: SHIPPED | OUTPATIENT
Start: 2020-01-13 | End: 2020-01-23

## 2020-01-31 ENCOUNTER — HOSPITAL ENCOUNTER (OUTPATIENT)
Age: 25
Setting detail: SPECIMEN
Discharge: HOME | End: 2020-01-31
Payer: COMMERCIAL

## 2020-02-10 LAB — SURGICAL PATHOLOGY REPORT: NORMAL

## 2020-03-25 RX ORDER — LEVOCETIRIZINE DIHYDROCHLORIDE 5 MG/1
TABLET, FILM COATED ORAL
Qty: 90 TABLET | Refills: 1 | Status: SHIPPED | OUTPATIENT
Start: 2020-03-25 | End: 2020-09-25

## 2020-06-10 RX ORDER — VERAPAMIL HYDROCHLORIDE 240 MG/1
TABLET, FILM COATED, EXTENDED RELEASE ORAL
Qty: 90 TABLET | Refills: 1 | Status: SHIPPED | OUTPATIENT
Start: 2020-06-10 | End: 2020-10-14 | Stop reason: SDUPTHER

## 2020-08-24 ENCOUNTER — OFFICE VISIT (OUTPATIENT)
Dept: FAMILY MEDICINE CLINIC | Age: 25
End: 2020-08-24
Payer: COMMERCIAL

## 2020-08-24 VITALS
HEART RATE: 90 BPM | RESPIRATION RATE: 16 BRPM | BODY MASS INDEX: 32.13 KG/M2 | WEIGHT: 212 LBS | OXYGEN SATURATION: 96 % | DIASTOLIC BLOOD PRESSURE: 81 MMHG | TEMPERATURE: 97.7 F | HEIGHT: 68 IN | SYSTOLIC BLOOD PRESSURE: 129 MMHG

## 2020-08-24 PROCEDURE — 99214 OFFICE O/P EST MOD 30 MIN: CPT | Performed by: FAMILY MEDICINE

## 2020-08-24 RX ORDER — VERAPAMIL HYDROCHLORIDE 360 MG/1
360 CAPSULE, DELAYED RELEASE PELLETS ORAL DAILY
Qty: 90 CAPSULE | Refills: 1 | Status: SHIPPED | OUTPATIENT
Start: 2020-08-24 | End: 2020-12-30 | Stop reason: SDUPTHER

## 2020-08-24 ASSESSMENT — PATIENT HEALTH QUESTIONNAIRE - PHQ9
SUM OF ALL RESPONSES TO PHQ QUESTIONS 1-9: 0
1. LITTLE INTEREST OR PLEASURE IN DOING THINGS: 0
2. FEELING DOWN, DEPRESSED OR HOPELESS: 0
SUM OF ALL RESPONSES TO PHQ9 QUESTIONS 1 & 2: 0
SUM OF ALL RESPONSES TO PHQ QUESTIONS 1-9: 0
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
## 2020-09-25 RX ORDER — LEVOCETIRIZINE DIHYDROCHLORIDE 5 MG/1
TABLET, FILM COATED ORAL
Qty: 90 TABLET | Refills: 1 | Status: SHIPPED | OUTPATIENT
Start: 2020-09-25 | End: 2020-10-14 | Stop reason: SDUPTHER

## 2020-09-25 NOTE — TELEPHONE ENCOUNTER
Vicki Robbins is calling to request a refill on the following medication(s):    Last Visit Date (If Applicable):  5/58/7816    Next Visit Date:    Visit date not found    Medication Request:  Requested Prescriptions     Pending Prescriptions Disp Refills    levocetirizine (XYZAL) 5 MG tablet [Pharmacy Med Name: LEVOCETIRIZINE 5 MG TABLET] 90 tablet 1     Sig: TAKE 1 TABLET BY MOUTH EVERY DAY

## 2020-10-15 RX ORDER — SUMATRIPTAN 100 MG/1
100 TABLET, FILM COATED ORAL
Qty: 9 TABLET | Refills: 3 | Status: SHIPPED | OUTPATIENT
Start: 2020-10-15 | End: 2021-03-10

## 2020-10-15 RX ORDER — VERAPAMIL HYDROCHLORIDE 240 MG/1
TABLET, FILM COATED, EXTENDED RELEASE ORAL
Qty: 90 TABLET | Refills: 1 | Status: SHIPPED | OUTPATIENT
Start: 2020-10-15 | End: 2020-12-30 | Stop reason: SDUPTHER

## 2020-10-15 RX ORDER — LEVOCETIRIZINE DIHYDROCHLORIDE 5 MG/1
5 TABLET, FILM COATED ORAL NIGHTLY
Qty: 90 TABLET | Refills: 1 | Status: SHIPPED | OUTPATIENT
Start: 2020-10-15 | End: 2021-07-23 | Stop reason: SDUPTHER

## 2020-10-15 NOTE — TELEPHONE ENCOUNTER
[facility] Staff is calling to request a refill on the following medication(s):    Last Visit Date (If Applicable):  5/25/3956    Next Visit Date:    Visit date not found    Medication Request:  Requested Prescriptions     Pending Prescriptions Disp Refills    SUMAtriptan (IMITREX) 100 MG tablet 9 tablet 3     Sig: Take 1 tablet by mouth once as needed for Migraine    verapamil (CALAN SR) 240 MG extended release tablet 90 tablet 1    levocetirizine (XYZAL) 5 MG tablet 90 tablet 1

## 2020-10-29 ENCOUNTER — APPOINTMENT (OUTPATIENT)
Dept: GENERAL RADIOLOGY | Age: 25
End: 2020-10-29
Payer: COMMERCIAL

## 2020-10-29 ENCOUNTER — APPOINTMENT (OUTPATIENT)
Dept: CT IMAGING | Age: 25
End: 2020-10-29
Payer: COMMERCIAL

## 2020-10-29 ENCOUNTER — HOSPITAL ENCOUNTER (EMERGENCY)
Age: 25
Discharge: HOME | End: 2020-10-29
Attending: EMERGENCY MEDICINE
Payer: COMMERCIAL

## 2020-10-29 VITALS
DIASTOLIC BLOOD PRESSURE: 96 MMHG | OXYGEN SATURATION: 98 % | HEIGHT: 68 IN | WEIGHT: 210 LBS | RESPIRATION RATE: 16 BRPM | SYSTOLIC BLOOD PRESSURE: 133 MMHG | TEMPERATURE: 97.5 F | HEART RATE: 96 BPM | BODY MASS INDEX: 31.83 KG/M2

## 2020-10-29 LAB
ABSOLUTE EOS #: 0.1 K/UL (ref 0–0.4)
ABSOLUTE IMMATURE GRANULOCYTE: NORMAL K/UL (ref 0–0.3)
ABSOLUTE LYMPH #: 2.2 K/UL (ref 1–4.8)
ABSOLUTE MONO #: 0.4 K/UL (ref 0.1–1.3)
AMPHETAMINE SCREEN URINE: NEGATIVE
ANION GAP SERPL CALCULATED.3IONS-SCNC: 10 MMOL/L (ref 9–17)
BARBITURATE SCREEN URINE: NEGATIVE
BASOPHILS # BLD: 1 % (ref 0–2)
BASOPHILS ABSOLUTE: 0 K/UL (ref 0–0.2)
BENZODIAZEPINE SCREEN, URINE: NEGATIVE
BUN BLDV-MCNC: 17 MG/DL (ref 6–20)
BUN/CREAT BLD: ABNORMAL (ref 9–20)
BUPRENORPHINE URINE: NORMAL
CALCIUM SERPL-MCNC: 8.9 MG/DL (ref 8.6–10.4)
CANNABINOID SCREEN URINE: NEGATIVE
CHLORIDE BLD-SCNC: 104 MMOL/L (ref 98–107)
CO2: 25 MMOL/L (ref 20–31)
COCAINE METABOLITE, URINE: NEGATIVE
CREAT SERPL-MCNC: 0.62 MG/DL (ref 0.7–1.2)
DIFFERENTIAL TYPE: NORMAL
EOSINOPHILS RELATIVE PERCENT: 1 % (ref 0–4)
GFR AFRICAN AMERICAN: >60 ML/MIN
GFR NON-AFRICAN AMERICAN: >60 ML/MIN
GFR SERPL CREATININE-BSD FRML MDRD: ABNORMAL ML/MIN/{1.73_M2}
GFR SERPL CREATININE-BSD FRML MDRD: ABNORMAL ML/MIN/{1.73_M2}
GLUCOSE BLD-MCNC: 90 MG/DL (ref 70–99)
HCT VFR BLD CALC: 46.5 % (ref 41–53)
HEMOGLOBIN: 16.4 G/DL (ref 13.5–17.5)
IMMATURE GRANULOCYTES: NORMAL %
LYMPHOCYTES # BLD: 30 % (ref 24–44)
MCH RBC QN AUTO: 31.9 PG (ref 26–34)
MCHC RBC AUTO-ENTMCNC: 35.3 G/DL (ref 31–37)
MCV RBC AUTO: 90.4 FL (ref 80–100)
MDMA URINE: NORMAL
METHADONE SCREEN, URINE: NEGATIVE
METHAMPHETAMINE, URINE: NORMAL
MONOCYTES # BLD: 5 % (ref 1–7)
NRBC AUTOMATED: NORMAL PER 100 WBC
OPIATES, URINE: NEGATIVE
OXYCODONE SCREEN URINE: NEGATIVE
PDW BLD-RTO: 12.6 % (ref 11.5–14.9)
PHENCYCLIDINE, URINE: NEGATIVE
PLATELET # BLD: 235 K/UL (ref 150–450)
PLATELET ESTIMATE: NORMAL
PMV BLD AUTO: 7.7 FL (ref 6–12)
POTASSIUM SERPL-SCNC: 4.2 MMOL/L (ref 3.7–5.3)
PROPOXYPHENE, URINE: NORMAL
RBC # BLD: 5.14 M/UL (ref 4.5–5.9)
RBC # BLD: NORMAL 10*6/UL
SEG NEUTROPHILS: 63 % (ref 36–66)
SEGMENTED NEUTROPHILS ABSOLUTE COUNT: 4.7 K/UL (ref 1.3–9.1)
SODIUM BLD-SCNC: 139 MMOL/L (ref 135–144)
TEST INFORMATION: NORMAL
TRICYCLIC ANTIDEPRESSANTS, UR: NORMAL
TROPONIN INTERP: NORMAL
TROPONIN T: NORMAL NG/ML
TROPONIN, HIGH SENSITIVITY: <6 NG/L (ref 0–22)
WBC # BLD: 7.4 K/UL (ref 3.5–11)
WBC # BLD: NORMAL 10*3/UL

## 2020-10-29 PROCEDURE — 80307 DRUG TEST PRSMV CHEM ANLYZR: CPT

## 2020-10-29 PROCEDURE — 80048 BASIC METABOLIC PNL TOTAL CA: CPT

## 2020-10-29 PROCEDURE — 70450 CT HEAD/BRAIN W/O DYE: CPT

## 2020-10-29 PROCEDURE — 73130 X-RAY EXAM OF HAND: CPT

## 2020-10-29 PROCEDURE — 84484 ASSAY OF TROPONIN QUANT: CPT

## 2020-10-29 PROCEDURE — 2580000003 HC RX 258: Performed by: STUDENT IN AN ORGANIZED HEALTH CARE EDUCATION/TRAINING PROGRAM

## 2020-10-29 PROCEDURE — 71046 X-RAY EXAM CHEST 2 VIEWS: CPT

## 2020-10-29 PROCEDURE — 93005 ELECTROCARDIOGRAM TRACING: CPT | Performed by: STUDENT IN AN ORGANIZED HEALTH CARE EDUCATION/TRAINING PROGRAM

## 2020-10-29 PROCEDURE — 6370000000 HC RX 637 (ALT 250 FOR IP): Performed by: STUDENT IN AN ORGANIZED HEALTH CARE EDUCATION/TRAINING PROGRAM

## 2020-10-29 PROCEDURE — 72125 CT NECK SPINE W/O DYE: CPT

## 2020-10-29 PROCEDURE — 36415 COLL VENOUS BLD VENIPUNCTURE: CPT

## 2020-10-29 PROCEDURE — 99284 EMERGENCY DEPT VISIT MOD MDM: CPT

## 2020-10-29 PROCEDURE — 85025 COMPLETE CBC W/AUTO DIFF WBC: CPT

## 2020-10-29 PROCEDURE — 73562 X-RAY EXAM OF KNEE 3: CPT

## 2020-10-29 RX ORDER — 0.9 % SODIUM CHLORIDE 0.9 %
1000 INTRAVENOUS SOLUTION INTRAVENOUS ONCE
Status: COMPLETED | OUTPATIENT
Start: 2020-10-29 | End: 2020-10-29

## 2020-10-29 RX ORDER — ACETAMINOPHEN 500 MG
1000 TABLET ORAL ONCE
Status: COMPLETED | OUTPATIENT
Start: 2020-10-29 | End: 2020-10-29

## 2020-10-29 RX ORDER — IBUPROFEN 600 MG/1
600 TABLET ORAL EVERY 6 HOURS PRN
Qty: 30 TABLET | Refills: 0 | Status: SHIPPED | OUTPATIENT
Start: 2020-10-29

## 2020-10-29 RX ORDER — ACETAMINOPHEN 325 MG/1
650 TABLET ORAL EVERY 6 HOURS PRN
Qty: 20 TABLET | Refills: 0 | Status: SHIPPED | OUTPATIENT
Start: 2020-10-29

## 2020-10-29 RX ORDER — ONDANSETRON 4 MG/1
4 TABLET, ORALLY DISINTEGRATING ORAL ONCE
Status: COMPLETED | OUTPATIENT
Start: 2020-10-29 | End: 2020-10-29

## 2020-10-29 RX ADMIN — SODIUM CHLORIDE 1000 ML: 9 INJECTION, SOLUTION INTRAVENOUS at 15:58

## 2020-10-29 RX ADMIN — ONDANSETRON 4 MG: 4 TABLET, ORALLY DISINTEGRATING ORAL at 15:58

## 2020-10-29 RX ADMIN — ACETAMINOPHEN 1000 MG: 500 TABLET, FILM COATED ORAL at 15:58

## 2020-10-29 ASSESSMENT — PAIN SCALES - GENERAL
PAINLEVEL_OUTOF10: 5
PAINLEVEL_OUTOF10: 5

## 2020-10-29 ASSESSMENT — PAIN DESCRIPTION - DESCRIPTORS: DESCRIPTORS: ACHING

## 2020-10-29 ASSESSMENT — PAIN DESCRIPTION - LOCATION: LOCATION: ABDOMEN

## 2020-10-29 ASSESSMENT — PAIN DESCRIPTION - PAIN TYPE: TYPE: ACUTE PAIN
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
## 2020-10-29 NOTE — ED NOTES
Daina from [facility] coming in to do testing. Pt is in ER waiting room lobby and understands he needs to wait for her to come in.       Sony Simmons, [address] [facility]  10/29/20 [id number]

## 2020-11-01 LAB
EKG ATRIAL RATE: 66 BPM
EKG P AXIS: 34 DEGREES
EKG P-R INTERVAL: 178 MS
EKG Q-T INTERVAL: 366 MS
EKG QRS DURATION: 92 MS
EKG QTC CALCULATION (BAZETT): 383 MS
EKG R AXIS: 46 DEGREES
EKG T AXIS: 21 DEGREES
EKG VENTRICULAR RATE: 66 BPM

## 2020-11-01 PROCEDURE — 93010 ELECTROCARDIOGRAM REPORT: CPT | Performed by: INTERNAL MEDICINE

## 2020-11-04 ENCOUNTER — HOSPITAL ENCOUNTER (OUTPATIENT)
Dept: GENERAL RADIOLOGY | Age: 25
Discharge: HOME | End: 2020-11-06
Payer: COMMERCIAL

## 2020-11-04 ENCOUNTER — HOSPITAL ENCOUNTER (OUTPATIENT)
Age: 25
Discharge: HOME | End: 2020-11-04
Payer: COMMERCIAL

## 2020-11-04 PROCEDURE — 71046 X-RAY EXAM CHEST 2 VIEWS: CPT

## 2020-11-04 PROCEDURE — 74018 RADEX ABDOMEN 1 VIEW: CPT

## 2020-11-16 ENCOUNTER — HOSPITAL ENCOUNTER (OUTPATIENT)
Dept: PHYSICAL THERAPY | Facility: CLINIC | Age: 25
Setting detail: THERAPIES SERIES
Discharge: HOME | End: 2020-11-16
Payer: COMMERCIAL

## 2020-11-16 PROCEDURE — 97161 PT EVAL LOW COMPLEX 20 MIN: CPT

## 2020-11-16 PROCEDURE — 97110 THERAPEUTIC EXERCISES: CPT
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
## 2020-11-17 ENCOUNTER — HOSPITAL ENCOUNTER (OUTPATIENT)
Dept: PHYSICAL THERAPY | Facility: CLINIC | Age: 25
Setting detail: THERAPIES SERIES
Discharge: HOME | End: 2020-11-17
Payer: COMMERCIAL

## 2020-11-17 PROCEDURE — 97530 THERAPEUTIC ACTIVITIES: CPT

## 2020-11-17 PROCEDURE — 97110 THERAPEUTIC EXERCISES: CPT

## 2020-11-17 NOTE — FLOWSHEET NOTE
[] [facility] [facility]  [address]  [phone]  F: [phone] [] [address]  [facility] [address]   [address]  P: [phone]  F: [phone] [] [place]  [address] [facility]  P: [phone]  F: [phone] [x] [address]  P: [phone]  F: [phone] [] [address]  [facility]   [address]   [place]: [phone]  F: [phone]      Physical Therapy Daily Treatment Note    Date:  2020  Patient Name:  Bety Jack    :  1995  MRN: [id number]  Physician: Cathy Varner                    Insurance: [facility]: x3 per week, x10 visits- PRESUMPTIVE   Medical Diagnosis: S06.0X0A; S13. 4XXA; S16. 1XXA; I20.332P; S67.53QV; S33. 5XXA; I93.105R; H37.986Z; S30. 1XXA             Rehab Codes: S06.0X0A; S13. 4XXA; S16. 1XXA; M73.009X; G13.45XD; S33. 5XXA; M36.476P; O11.211Q; S30. 1XXA  Onset Date: 10/29/2020                    Next 's appt.: 2020  Visit# / total visits: 2/10     Cancels/No Shows: 0/0    Subjective:    Pain:  [x] Yes  [] No Location: cervical spine Pain Rating: (0-10 scale) 4/10  Pain altered Tx:  [x] No  [] Yes  Action: N/A  Comments: Reports gradual inc dizziness post initial evaluation- which remained most of yesterday- and lingers into today. Reports no change in cervical spine pain with performing HEP interventions last PM.     At present:  Dizziness /10 [7-8/10 dizziness last PM]   Cervical spine pain 2/10     However, later in evaluation reveals current dizziness:  \"Comes and goes in waves. \"  However, inc with bending forward. Spinning- but don't see the room spinning. Remains for several hours. No positions or activities give it relief.      \"When I turn quickly; extend my arms above my Torsion    Test Position Pain/Symptoms Presence of Catch-up Saccades   (R) Slight inc in dizziness  None   (L) None  None     No change in baseline dizziness during L testing- remains 4/10. Chair Rotation Test    Test Position Result Presence of Nystagmus Indication   Condition #1- Cervical Rotation (Bias Cervical) A None Sig inc dizziness- ceased test d/t pt subjective report    Condition #2- Full Body Rotation (Bias Vestibular System) N None None     Cervical Kinesthetic Sense Test    Motion Trial 1 Trial 2 Trial 3 Average Score   R rotation A A A A   L rotation A A A A   Flexion A 6 6 A   Extension A A 4.5 A     Specific Instructions for next treatment: Assess BPPV. Review HEP interventions; and assess supine chin-tuck with head-lift and prone cervical extension strength testing next visit. Begin combination cervical spine treatment and cervicogenic dizziness treatment. Treatment Charges: Mins Time Units   []  Modalities      [x]  Ther Exercise 10 3:45-3:55PM 1   []  Manual Therapy      [x]  Ther Activities 40 3:05-3:45PM 2   []  Aquatics      []  Vasocompression      []  Other      Total Treatment time 50 3:05-3:55PM 3     Assessment: [] Progressing toward goals. [] No change. [x] Other: Pt presents with MIN cervical spine pain and dizziness, however, reports poor tolerance to initial evaluation- gradual inc in dizziness yesterday, which remains into today. Therefore, performed vestibular screen and assessed PCSS for post-concussive symptoms. Pt's oculomotor screen- with and without fixation- is unremarkable. However, reports dizziness with tests which involve head/neck motion. Therefore, proceeded with cervicogenic test battery- demos sig dizziness with chair rotation test. Pt's dizziness most likely secondary to cervicogenic dizziness and concurrent cervical spine pain and limitation. Therefore, will begin combo treatment for both next visit. Consider ideas above and below next visit.    [x] Patient would continue to benefit from skilled physical therapy services in order to: inc cervical spine AROM; inc B shoulder AROM without inc symptoms; inc cervical and postural strength; dec dizziness; and overall improve tolerance for bending forward, sleeping, driving, and returning to work. STG: (to be met in 6 treatments)  1. ? Pain: Patient will report < 4/10 pain at rest and < 6/10 pain with active movement in order to improve QOL. 2. ? ROM: Will demo all cervical AROM with < 4/10 P at end-ranges; and also inc cervical rotation AROM B to 80 deg for improved functional mobility, driving. No inc cervical spine pain or dizziness with B shoulder AROM. 3. ? Strength: Will demo 4/5 R shoulder IR/ER strength in order to better match L and improve ADLs. Also seated cervical retractions x20 reps without inc pain in order to prevent future stretch weakness while sitting/driving at work. 4. ? Function: Patient will report decreased TTP to low cervical spinous processes, B SIERRA/LS in order to indicate decreased inflammation and improved healing of injured site. 5. Patient to be independent with home exercise program as demonstrated by performance with correct form without cues. LTG: (to be met in 10 treatments)  1. Will report improved sleeping tolerance without use of pain patches. 2. Will report < 4/10 P with cervical rotation for driving for improved return to work. 3. Improve NDI to 25%     Vestibular goals:  Problems:                                                                                     [x]  1. Vertigo  []  2. Difficulty walking a straight course                                    []  3. Positive [place] Hallpike                                               []  4.  Static balance deficit: mCTSIB  []  5. Dynamic balance deficit: Saxena Balance Scale (BBS), Dynamic Gait Index (DGI), Functional Gait Assessment (FGA)  [x]  6. Increased Dizziness Handicap Inventory [facility])   [x]  7.   Increased Post Concussion Symptom Survey (PCSS)  []  8. Increased Brain Injury Vision Symptom Survey (BIVSS)      Short Term Goals: (    6         Treatments)  [x] 1. Decreased Vertigo  [] 2. Improved gait mechanics, trajectory  [] 3. Negative Joseph Hallpike  [] 4. Improved static balance  [] 5. Improved dynamic balance  [x] 6. Decreased Dizziness Handicap Inventory ([facility] [address])- < 44/100  [x] 7. Decreased Post Concussion Symptom Survey (PCSS)- < 59/100  [] 8. Decreased Brain Injury Vision Symptom Survey (BIVSS)  [x] 9. Independent with Home Exercise Program (HEP)  [] 10. Demonstrate knowledge of fall prevention    Long Term Goals: (     10         Treatments)  [x] 1. Will report dec or deny symptoms with bending forward, turning his head quickly, and extending his arms above his head in order to improve QOL and work tolerance.         Patient goals: none listed. Pt. Education:  [x] Yes  [] No  [] Reviewed Prior HEP/Ed  Method of Education: [x] Verbal  [] Demo  [] Written  Comprehension of Education:  [x] Verbalizes understanding. [] Demonstrates understanding. [] Needs review. [] Demonstrates/verbalizes HEP/Ed previously given. 11/17/2020- Educated per grid above; also potential dizziness secondary to cervicogenic dizziness per results of vestibular screen above- verbalized understanding to all. Plan: [x] Continue current frequency toward long and short term goals. [x] Specific Instructions for subsequent treatments: Assess BPPV. Review HEP interventions; and assess supine chin-tuck with head-lift and prone cervical extension strength testing next visit. Begin combination cervical spine treatment and cervicogenic dizziness treatment.       Time In: 3:05PM            Time Out: 3:55PM    Electronically signed by:  Marlen Erickson, PT

## 2020-11-19 ENCOUNTER — HOSPITAL ENCOUNTER (OUTPATIENT)
Dept: PHYSICAL THERAPY | Facility: CLINIC | Age: 25
Setting detail: THERAPIES SERIES
Discharge: HOME | End: 2020-11-19
Payer: COMMERCIAL

## 2020-11-19 PROCEDURE — 97110 THERAPEUTIC EXERCISES: CPT

## 2020-11-19 PROCEDURE — 97530 THERAPEUTIC ACTIVITIES: CPT
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
## 2020-11-23 ENCOUNTER — HOSPITAL ENCOUNTER (OUTPATIENT)
Dept: PHYSICAL THERAPY | Facility: CLINIC | Age: 25
Setting detail: THERAPIES SERIES
Discharge: HOME | End: 2020-11-23
Payer: COMMERCIAL

## 2020-11-23 PROCEDURE — 97110 THERAPEUTIC EXERCISES: CPT

## 2020-11-23 PROCEDURE — 97530 THERAPEUTIC ACTIVITIES: CPT
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
## 2020-11-24 ENCOUNTER — HOSPITAL ENCOUNTER (OUTPATIENT)
Dept: PHYSICAL THERAPY | Facility: CLINIC | Age: 25
Setting detail: THERAPIES SERIES
Discharge: HOME | End: 2020-11-24
Payer: COMMERCIAL

## 2020-11-24 PROCEDURE — 97110 THERAPEUTIC EXERCISES: CPT

## 2020-11-24 PROCEDURE — 97530 THERAPEUTIC ACTIVITIES: CPT
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
## 2020-11-30 ENCOUNTER — HOSPITAL ENCOUNTER (OUTPATIENT)
Dept: PHYSICAL THERAPY | Facility: CLINIC | Age: 25
Setting detail: THERAPIES SERIES
Discharge: HOME | End: 2020-11-30
Payer: COMMERCIAL

## 2020-11-30 PROCEDURE — 97140 MANUAL THERAPY 1/> REGIONS: CPT

## 2020-11-30 PROCEDURE — 97110 THERAPEUTIC EXERCISES: CPT
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
## 2020-12-01 ENCOUNTER — HOSPITAL ENCOUNTER (OUTPATIENT)
Dept: PHYSICAL THERAPY | Facility: CLINIC | Age: 25
Setting detail: THERAPIES SERIES
Discharge: HOME | End: 2020-12-01
Payer: COMMERCIAL

## 2020-12-01 PROCEDURE — 97016 VASOPNEUMATIC DEVICE THERAPY: CPT

## 2020-12-01 PROCEDURE — 97110 THERAPEUTIC EXERCISES: CPT

## 2020-12-01 PROCEDURE — 97140 MANUAL THERAPY 1/> REGIONS: CPT
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
## 2020-12-03 ENCOUNTER — HOSPITAL ENCOUNTER (OUTPATIENT)
Dept: PHYSICAL THERAPY | Facility: CLINIC | Age: 25
Setting detail: THERAPIES SERIES
Discharge: HOME | End: 2020-12-03
Payer: COMMERCIAL

## 2020-12-03 PROCEDURE — 97112 NEUROMUSCULAR REEDUCATION: CPT

## 2020-12-03 PROCEDURE — 97110 THERAPEUTIC EXERCISES: CPT

## 2020-12-03 PROCEDURE — 97016 VASOPNEUMATIC DEVICE THERAPY: CPT

## 2020-12-03 PROCEDURE — 97140 MANUAL THERAPY 1/> REGIONS: CPT
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
## 2020-12-07 ENCOUNTER — APPOINTMENT (OUTPATIENT)
Dept: PHYSICAL THERAPY | Facility: CLINIC | Age: 25
End: 2020-12-07
Payer: COMMERCIAL

## 2020-12-07 ENCOUNTER — HOSPITAL ENCOUNTER (OUTPATIENT)
Dept: PHYSICAL THERAPY | Facility: CLINIC | Age: 25
Setting detail: THERAPIES SERIES
Discharge: HOME | End: 2020-12-07
Payer: COMMERCIAL

## 2020-12-07 PROCEDURE — 97112 NEUROMUSCULAR REEDUCATION: CPT

## 2020-12-07 PROCEDURE — 97110 THERAPEUTIC EXERCISES: CPT
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
## 2020-12-08 ENCOUNTER — HOSPITAL ENCOUNTER (OUTPATIENT)
Dept: PHYSICAL THERAPY | Facility: CLINIC | Age: 25
Setting detail: THERAPIES SERIES
Discharge: HOME | End: 2020-12-08
Payer: COMMERCIAL

## 2020-12-08 PROCEDURE — 97112 NEUROMUSCULAR REEDUCATION: CPT

## 2020-12-08 PROCEDURE — 97140 MANUAL THERAPY 1/> REGIONS: CPT

## 2020-12-08 PROCEDURE — 97110 THERAPEUTIC EXERCISES: CPT

## 2020-12-08 PROCEDURE — 97016 VASOPNEUMATIC DEVICE THERAPY: CPT

## 2020-12-08 NOTE — FLOWSHEET NOTE
major complaint cervical and thoracic pain, dizziness  Exercises:   Exercise Reps/ Time Weight/ Level Comments           R wrist flexion str. 3x30\" ea  GENTLE OP; following MT techniques  x   Seated putty gripping- full, lumbrical, thumb only, thumb flexion    Also resisted finger extension against rubberband- at all DIP, PIP joints of R hand  2x10 ea  Medium putty- 11/30/2020  medium putty- for thumb only secondary to inc pain  - 12/8/20    Thick rubber-band      HEP- reviewed  x   Gripper  3x10, 5#, green  Hand  and finger   x   Individual digit gripper  2x10 5#, green   x   Supination/pronation  20x   x   Flex/ext 20x   x   UD/RD 20x   x                                   Other:   MT, then active interventions, then finish with ice vaso     MT: STM to R extensor musculature, held hawkgrips d/t poor chiquita last visit. Treatment Charges: Mins Time Units   []  Modalities- ice massage       [x]  Ther Exercise 18 12:15pm-12:33pm 1   [x]  Manual Therapy 10 12:05pm-12:15pm 1   []  Ther Activities      []  Aquatics      [x]  Vasocompression 10 12:33-12:43pm 1   []  Other      Total Treatment time 38  3      Assessment: [] Progressing toward goals. [] No change. [x] Other: Able to increase gripper to 5# for whole hand and individual, with fair tolerance, however pt verbalizes significant pain over radial aspect of forearm. Pt completes AROM of L wrist with increased reps, with c/o pain throughout. Pt also able to increase reps with putty and rubberband exercises to 2 sets of 10 reps with fair chiquita; pt reports it is \"almost unbearable in the thumb\" with putty exercises but willing to complete. Pt reports 5.5/10 pain in L wirst, forearm, and thumb after therapeutic exercises. Cont with vaso without compression at 38 deg to R elbow and forearm, with poor chiquita d/t reports of increased pain after per pt.    [x] Patient would continue to benefit from skilled physical therapy services in order to: inc cervical spine AROM; inc B shoulder AROM without inc symptoms; inc cervical and postural strength; dec dizziness; and overall improve tolerance for bending forward, sleeping, driving, and returning to work. STG: (to be met in 6 treatments)  1. ? Pain: Patient will report < 4/10 pain at rest and < 6/10 pain with active movement in order to improve QOL. 2. ? ROM: Will demo all cervical AROM with < 4/10 P at end-ranges; and also inc cervical rotation AROM B to 80 deg for improved functional mobility, driving. No inc cervical spine pain or dizziness with B shoulder AROM. 3. ? Strength: Will demo 4/5 R shoulder IR/ER strength in order to better match L and improve ADLs. Also seated cervical retractions x20 reps without inc pain in order to prevent future stretch weakness while sitting/driving at work. 4. ? Function: Patient will report decreased TTP to low cervical spinous processes, B SIERRA/LS in order to indicate decreased inflammation and improved healing of injured site. 5. Patient to be independent with home exercise program as demonstrated by performance with correct form without cues. LTG: (to be met in 10 treatments)  1. Will report improved sleeping tolerance without use of pain patches. 2. Will report < 4/10 P with cervical rotation for driving for improved return to work. 3. Improve NDI to 25%      Patient goals: none listed. R hand, elbow goals- 11/24/2020:  1. Will deny R thumb pain and symptoms with thumb extension AROM and wrist radial deviation in order to improve functional mobility  2. R hand  strength to match L and with < 4/10 P in order to improve ADLs  3. Patient will report decreased TTP to R third digit, wrist extensor muscle bellies in order to indicate decreased inflammation and improved healing of injured site. 4. Will report < 4/10 R thumb, wrist, elbow P with ADLs, light work tasks     Pt.  Education:  [x] Yes  [] No  [x] Reviewed Prior HEP/Ed  Method of Education: [] Verbal  [] LTLP [] Written  Comprehension of Education:  [] Verbalizes understanding. [] Demonstrates understanding. [] Needs review. [x] Demonstrates/verbalizes HEP/Ed previously given. 11/17/2020- Educated per grid above; also potential dizziness secondary to cervicogenic dizziness per results of vestibular screen above- verbalized understanding to all. 11/24/2020- Issued all above labeled as HEP for home- verbalized understanding to all. Issued new, updated HEP handout. Issued both soft and medium putty, as well as rubber-band.        Plan: [x]PT services on hold x 1 month per primary PT.    [] Specific Instructions for subsequent treatments:       Time In: 12:05pm         Time Out: 12:50pm    Electronically signed by:  Jovanna Reynolds PTA
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
## 2020-12-09 ENCOUNTER — PATIENT MESSAGE (OUTPATIENT)
Dept: FAMILY MEDICINE CLINIC | Age: 25
End: 2020-12-09

## 2020-12-09 ENCOUNTER — OFFICE VISIT (OUTPATIENT)
Dept: NEUROLOGY | Age: 25
End: 2020-12-09
Payer: COMMERCIAL

## 2020-12-09 VITALS
WEIGHT: 210 LBS | HEART RATE: 118 BPM | TEMPERATURE: 97 F | DIASTOLIC BLOOD PRESSURE: 76 MMHG | OXYGEN SATURATION: 95 % | BODY MASS INDEX: 31.83 KG/M2 | HEIGHT: 68 IN | SYSTOLIC BLOOD PRESSURE: 123 MMHG

## 2020-12-09 PROCEDURE — 99204 OFFICE O/P NEW MOD 45 MIN: CPT | Performed by: STUDENT IN AN ORGANIZED HEALTH CARE EDUCATION/TRAINING PROGRAM
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
## 2020-12-10 ENCOUNTER — APPOINTMENT (OUTPATIENT)
Dept: PHYSICAL THERAPY | Facility: CLINIC | Age: 25
End: 2020-12-10
Payer: COMMERCIAL

## 2020-12-11 ENCOUNTER — OFFICE VISIT (OUTPATIENT)
Dept: NEUROLOGY | Age: 25
End: 2020-12-11
Payer: COMMERCIAL

## 2020-12-11 VITALS
DIASTOLIC BLOOD PRESSURE: 80 MMHG | WEIGHT: 210 LBS | TEMPERATURE: 98.6 F | HEART RATE: 72 BPM | BODY MASS INDEX: 31.83 KG/M2 | HEIGHT: 68 IN | SYSTOLIC BLOOD PRESSURE: 132 MMHG

## 2020-12-11 PROCEDURE — 95911 NRV CNDJ TEST 9-10 STUDIES: CPT | Performed by: PSYCHIATRY & NEUROLOGY

## 2020-12-11 PROCEDURE — 95886 MUSC TEST DONE W/N TEST COMP: CPT | Performed by: PSYCHIATRY & NEUROLOGY
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
## 2020-12-13 ASSESSMENT — ENCOUNTER SYMPTOMS
EYE PAIN: 0
SHORTNESS OF BREATH: 0
DIARRHEA: 0
NAUSEA: 0
ABDOMINAL DISTENTION: 0
CHOKING: 0
CONSTIPATION: 0
VOMITING: 0
CHEST TIGHTNESS: 0
WHEEZING: 0
COUGH: 0
ABDOMINAL PAIN: 0
EYE REDNESS: 0

## 2020-12-18 ENCOUNTER — OFFICE VISIT (OUTPATIENT)
Dept: FAMILY MEDICINE CLINIC | Age: 25
End: 2020-12-18
Payer: COMMERCIAL

## 2020-12-18 ENCOUNTER — HOSPITAL ENCOUNTER (OUTPATIENT)
Age: 25
Setting detail: SPECIMEN
Discharge: HOME | End: 2020-12-18
Payer: COMMERCIAL

## 2020-12-18 VITALS
DIASTOLIC BLOOD PRESSURE: 82 MMHG | HEART RATE: 94 BPM | TEMPERATURE: 97.9 F | WEIGHT: 221 LBS | HEIGHT: 68 IN | OXYGEN SATURATION: 97 % | SYSTOLIC BLOOD PRESSURE: 137 MMHG | BODY MASS INDEX: 33.49 KG/M2

## 2020-12-18 LAB
ABSOLUTE EOS #: 0.05 K/UL (ref 0–0.44)
ABSOLUTE IMMATURE GRANULOCYTE: <0.03 K/UL (ref 0–0.3)
ABSOLUTE LYMPH #: 1.65 K/UL (ref 1.1–3.7)
ABSOLUTE MONO #: 0.25 K/UL (ref 0.1–1.2)
ALBUMIN SERPL-MCNC: 4.2 G/DL (ref 3.5–5.2)
ALBUMIN/GLOBULIN RATIO: 1.5 (ref 1–2.5)
ALP BLD-CCNC: 105 U/L (ref 40–129)
ALT SERPL-CCNC: 31 U/L (ref 5–41)
ANION GAP SERPL CALCULATED.3IONS-SCNC: 11 MMOL/L (ref 9–17)
AST SERPL-CCNC: 18 U/L
BASOPHILS # BLD: 1 % (ref 0–2)
BASOPHILS ABSOLUTE: 0.06 K/UL (ref 0–0.2)
BILIRUB SERPL-MCNC: 0.33 MG/DL (ref 0.3–1.2)
BUN BLDV-MCNC: 18 MG/DL (ref 6–20)
BUN/CREAT BLD: ABNORMAL (ref 9–20)
CALCIUM SERPL-MCNC: 9 MG/DL (ref 8.6–10.4)
CHLORIDE BLD-SCNC: 109 MMOL/L (ref 98–107)
CHOLESTEROL/HDL RATIO: 4.7
CHOLESTEROL: 177 MG/DL
CO2: 20 MMOL/L (ref 20–31)
CREAT SERPL-MCNC: 0.65 MG/DL (ref 0.7–1.2)
DIFFERENTIAL TYPE: ABNORMAL
EOSINOPHILS RELATIVE PERCENT: 1 % (ref 1–4)
GFR AFRICAN AMERICAN: >60 ML/MIN
GFR NON-AFRICAN AMERICAN: >60 ML/MIN
GFR SERPL CREATININE-BSD FRML MDRD: ABNORMAL ML/MIN/{1.73_M2}
GFR SERPL CREATININE-BSD FRML MDRD: ABNORMAL ML/MIN/{1.73_M2}
GLUCOSE BLD-MCNC: 94 MG/DL (ref 70–99)
HCT VFR BLD CALC: 51 % (ref 40.7–50.3)
HDLC SERPL-MCNC: 38 MG/DL
HEMOGLOBIN: 16.6 G/DL (ref 13–17)
IMMATURE GRANULOCYTES: 0 %
LDL CHOLESTEROL: 122 MG/DL (ref 0–130)
LYMPHOCYTES # BLD: 34 % (ref 24–43)
MCH RBC QN AUTO: 29.7 PG (ref 25.2–33.5)
MCHC RBC AUTO-ENTMCNC: 32.5 G/DL (ref 28.4–34.8)
MCV RBC AUTO: 91.2 FL (ref 82.6–102.9)
MONOCYTES # BLD: 5 % (ref 3–12)
NRBC AUTOMATED: 0 PER 100 WBC
PDW BLD-RTO: 12.4 % (ref 11.8–14.4)
PLATELET # BLD: 230 K/UL (ref 138–453)
PLATELET ESTIMATE: ABNORMAL
PMV BLD AUTO: 10 FL (ref 8.1–13.5)
POTASSIUM SERPL-SCNC: 4.6 MMOL/L (ref 3.7–5.3)
RBC # BLD: 5.59 M/UL (ref 4.21–5.77)
RBC # BLD: ABNORMAL 10*6/UL
SEG NEUTROPHILS: 59 % (ref 36–65)
SEGMENTED NEUTROPHILS ABSOLUTE COUNT: 2.85 K/UL (ref 1.5–8.1)
SODIUM BLD-SCNC: 140 MMOL/L (ref 135–144)
THYROXINE, FREE: 1.01 NG/DL (ref 0.93–1.7)
TOTAL PROTEIN: 7 G/DL (ref 6.4–8.3)
TRIGL SERPL-MCNC: 83 MG/DL
TSH SERPL DL<=0.05 MIU/L-ACNC: 1.44 MIU/L (ref 0.3–5)
VITAMIN D 25-HYDROXY: 14.7 NG/ML (ref 30–100)
VLDLC SERPL CALC-MCNC: ABNORMAL MG/DL (ref 1–30)
WBC # BLD: 4.9 K/UL (ref 3.5–11.3)
WBC # BLD: ABNORMAL 10*3/UL

## 2020-12-18 PROCEDURE — 99213 OFFICE O/P EST LOW 20 MIN: CPT | Performed by: FAMILY MEDICINE

## 2020-12-18 ASSESSMENT — PATIENT HEALTH QUESTIONNAIRE - PHQ9
SUM OF ALL RESPONSES TO PHQ QUESTIONS 1-9: 0
2. FEELING DOWN, DEPRESSED OR HOPELESS: 0
SUM OF ALL RESPONSES TO PHQ QUESTIONS 1-9: 0
SUM OF ALL RESPONSES TO PHQ9 QUESTIONS 1 & 2: 0
SUM OF ALL RESPONSES TO PHQ QUESTIONS 1-9: 0
1. LITTLE INTEREST OR PLEASURE IN DOING THINGS: 0
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
## 2020-12-30 ENCOUNTER — PATIENT MESSAGE (OUTPATIENT)
Dept: FAMILY MEDICINE CLINIC | Age: 25
End: 2020-12-30

## 2020-12-30 ENCOUNTER — OFFICE VISIT (OUTPATIENT)
Dept: NEUROLOGY | Age: 25
End: 2020-12-30
Payer: COMMERCIAL

## 2020-12-30 VITALS
HEART RATE: 103 BPM | BODY MASS INDEX: 33.49 KG/M2 | OXYGEN SATURATION: 96 % | DIASTOLIC BLOOD PRESSURE: 86 MMHG | SYSTOLIC BLOOD PRESSURE: 133 MMHG | WEIGHT: 221 LBS | HEIGHT: 68 IN

## 2020-12-30 DIAGNOSIS — R20.2 NUMBNESS AND TINGLING: Primary | ICD-10-CM

## 2020-12-30 DIAGNOSIS — R20.0 NUMBNESS AND TINGLING: Primary | ICD-10-CM

## 2020-12-30 PROCEDURE — 99215 OFFICE O/P EST HI 40 MIN: CPT | Performed by: STUDENT IN AN ORGANIZED HEALTH CARE EDUCATION/TRAINING PROGRAM

## 2020-12-30 RX ORDER — ERGOCALCIFEROL 1.25 MG/1
50000 CAPSULE ORAL WEEKLY
COMMUNITY

## 2020-12-30 RX ORDER — AMITRIPTYLINE HYDROCHLORIDE 25 MG/1
25 TABLET, FILM COATED ORAL NIGHTLY
Qty: 30 TABLET | Refills: 1 | Status: SHIPPED | OUTPATIENT
Start: 2020-12-30 | End: 2021-02-18

## 2020-12-30 NOTE — TELEPHONE ENCOUNTER
From: Eliza Root  To: Randy Aponte DO  Sent: 12/30/2020 2:58 PM EST  Subject: Non-Urgent Medical Question    Hi Dr. Jj Redd,     I noticed a couple days after starting the vitamin D supplement, I developed tinnitus, and it's gotten worse since last week. Also it's almost constant now. Is this because of the large dose of vitamin d or something else? And what should I do for it?

## 2020-12-30 NOTE — PROGRESS NOTES
that he won't be able to return to work as that requires moving relatively heavy objects. He is worried that may injure his arm further.      He has significant history of Migraines since high school. Has been taking verapamil. Well controlled. Only had three migraines in last two years, that two when missed medication. Pain starts in [place] and radiates to front, deep pain that shoots straight through. Can be 6+ hours. Has vision going \"all white\" as aura.      Getting 6 hours sleep on average.      Sinus pressures and opacifications on the MRI for last three years, followed with ENT in [place]. Interval history  EMG/NCS done: Right median and ulnar motor studies showed normal DMLs, CMAP amplitudes, motor nerve conduction studies and F-wave latencies. Bilateral median and lateral antebrachial cutaneous antidromic and right radial sensory studies showed normal peak latencies, amplitudes and sensory nerve conduction studies. Right median and ulnar transcarpal orthodromic sensory studies showed normal peak latencies, amplitudes and sensory nerve conduction studies. Monopolar EMG study of the selected muscles showed no evidence of active denervation or chronic reinnervation. Full interference pattern was seen. Says he has continued to have right arm discomfort, also having difficulty occasionally gripping with the right hand. Also planing of few days of bad headache, is interested in trying amitriptyline both for additional headache prevention as well as neuropathic pain that he continues to experience in his arms. Occasional tinnitus, no hearing loss or worsening of vertigo symptoms. Denies any symptoms of infection.       PMH/PSH/SH/FMH: Remain unchanged since last visit  Hospital Outpatient Visit on 12/18/2020   Component Date Value Ref Range Status    Vit D, 25-Hydroxy 12/18/2020 14.7* 30.0 - 100.0 ng/mL Final    Comment:    Reference Range:  Vitamin D status         Range   Deficiency              <20 ng/mL   Mild Deficiency       20-30 ng/mL   Sufficiency           ng/mL   Toxicity               >100 ng/mL      TSH 12/18/2020 1.44  0.30 - 5.00 mIU/L Final    Thyroxine, Free 12/18/2020 1.01  0.93 - 1.70 ng/dL Final    Cholesterol 12/18/2020 177  <200 mg/dL Final    Comment:    Cholesterol Guidelines:      <200  Desirable   200-240  Borderline      >240  Undesirable         HDL 12/18/2020 38* >40 mg/dL Final    Comment:    HDL Guidelines:    <40     Undesirable   40-59    Borderline    >59     Desirable         LDL Cholesterol 12/18/2020 122  0 - 130 mg/dL Final    Comment:    LDL Guidelines:     <100    Desirable   100-129   Near to/above Desirable   130-159   Borderline      >159   Undesirable     Direct (measured) LDL and calculated LDL are not interchangeable tests.  Chol/HDL Ratio 12/18/2020 4.7  <5 Final            Triglycerides 12/18/2020 83  <150 mg/dL Final    Comment:    Triglyceride Guidelines:     <150   Desirable   150-199  Borderline   200-499  High     >499   Very high   Based on AHA Guidelines for fasting triglyceride, October 2012.          VLDL 12/18/2020 NOT REPORTED  1 - 30 mg/dL Final    Glucose 12/18/2020 94  70 - 99 mg/dL Final    BUN 12/18/2020 18  6 - 20 mg/dL Final    CREATININE 12/18/2020 0.65* 0.70 - 1.20 mg/dL Final    Bun/Cre Ratio 12/18/2020 NOT REPORTED  9 - 20 Final    Calcium 12/18/2020 9.0  8.6 - 10.4 mg/dL Final    Sodium 12/18/2020 140  135 - 144 mmol/L Final    Potassium 12/18/2020 4.6  3.7 - 5.3 mmol/L Final    Chloride 12/18/2020 109* 98 - 107 mmol/L Final    CO2 12/18/2020 20  20 - 31 mmol/L Final    Anion Gap 12/18/2020 11  9 - 17 mmol/L Final    Alkaline Phosphatase 12/18/2020 105  40 - 129 U/L Final    ALT 12/18/2020 31  5 - 41 U/L Final    AST 12/18/2020 18  <40 U/L Final    Total Bilirubin 12/18/2020 0.33  0.3 - 1.2 mg/dL Final    Total Protein 12/18/2020 7.0  6.4 - 8.3 g/dL Final    Alb 12/18/2020 4.2  3.5 - 5.2 g/dL Final    Albumin/Globulin Ratio 12/18/2020 1.5  1.0 - 2.5 Final    GFR Non- 12/18/2020 >60  >60 mL/min Final    GFR  12/18/2020 >60  >60 mL/min Final    GFR Comment 12/18/2020        Final    Comment: Average GFR for 2129 years old:   116 mL/min/1.73sq m  Chronic Kidney Disease:   <60 mL/min/1.73sq m  Kidney failure:   <15 mL/min/1.73sq m              eGFR calculated using average adult body mass.  Additional eGFR calculator available at:        [employer]            GFR Staging 12/18/2020 NOT REPORTED   Final    WBC 12/18/2020 4.9  3.5 - 11.3 k/uL Final    RBC 12/18/2020 5.59  4.21 - 5.77 m/uL Final    Hemoglobin 12/18/2020 16.6  13.0 - 17.0 g/dL Final    Hematocrit 12/18/2020 51.0* 40.7 - 50.3 % Final    MCV 12/18/2020 91.2  82.6 - 102.9 fL Final    MCH 12/18/2020 29.7  25.2 - 33.5 pg Final    MCHC 12/18/2020 32.5  28.4 - 34.8 g/dL Final    RDW 12/18/2020 12.4  11.8 - 14.4 % Final    Platelets 22/87/3207 230  138 - 453 k/uL Final    MPV 12/18/2020 10.0  8.1 - 13.5 fL Final    NRBC Automated 12/18/2020 0.0  0.0 per 100 WBC Final    Differential Type 12/18/2020 NOT REPORTED   Final    Seg Neutrophils 12/18/2020 59  36 - 65 % Final    Lymphocytes 12/18/2020 34  24 - 43 % Final    Monocytes 12/18/2020 5  3 - 12 % Final    Eosinophils % 12/18/2020 1  1 - 4 % Final    Basophils 12/18/2020 1  0 - 2 % Final    Immature Granulocytes 12/18/2020 0  0 % Final    Segs Absolute 12/18/2020 2.85  1.50 - 8.10 k/uL Final    Absolute Lymph # 12/18/2020 1.65  1.10 - 3.70 k/uL Final    Absolute Mono # 12/18/2020 0.25  0.10 - 1.20 k/uL Final    Absolute Eos # 12/18/2020 0.05  0.00 - 0.44 k/uL Final    Basophils Absolute 12/18/2020 0.06  0.00 - 0.20 k/uL Final    Absolute Immature Granulocyte 12/18/2020 <0.03  0.00 - 0.30 k/uL Final    WBC Morphology 12/18/2020 NOT REPORTED   Final    RBC Morphology 12/18/2020 NOT REPORTED   Final    Platelet Estimate 12/18/2020 NOT REPORTED   Final    except those listed in the interval history.        Diagnosis Date    Hay fever     Insomnia         ALLERGIES:   Allergies   Allergen Reactions    Seasonal        MEDICATIONS:   Current Outpatient Medications   Medication Sig Dispense Refill    acetaminophen (TYLENOL) 325 MG tablet Take 2 tablets by mouth every 6 hours as needed for Pain (Patient not taking: Reported on 12/9/2020) 20 tablet 0    ibuprofen (IBU) 600 MG tablet Take 1 tablet by mouth every 6 hours as needed for Pain (Patient not taking: Reported on 12/9/2020) 30 tablet 0    SUMAtriptan (IMITREX) 100 MG tablet Take 1 tablet by mouth once as needed for Migraine 9 tablet 3    verapamil (CALAN SR) 240 MG extended release tablet 1 po qd (Patient not taking: Reported on 12/9/2020) 90 tablet 1    levocetirizine (XYZAL) 5 MG tablet Take 1 tablet by mouth nightly 90 tablet 1    verapamil (VERELAN) 360 MG extended release capsule Take 1 capsule by mouth daily (Patient not taking: Reported on 12/9/2020) 90 capsule 1    Diclofenac Sodium  MG TB24 Take 100 mg by mouth daily (Patient not taking: Reported on 12/9/2020) 30 tablet 5    loratadine (CLARITIN) 10 MG capsule Take 10 mg by mouth daily as needed      verapamil (CALAN SR) 240 MG extended release tablet TAKE 1 TABLET BY MOUTH AT BEDTIME 30 tablet 5    azithromycin (ZITHROMAX) 250 MG tablet 2 po qd day 1,then 1 po qd for 4 days (Patient not taking: Reported on 8/7/2019) 1 packet 0    verapamil (VERELAN) 240 MG extended release capsule TAKE ONE CAPSULE BY MOUTH NIGHTLY. (Patient not taking: Reported on 12/9/2020) 30 capsule 5    ALLZITAL  MG TABS Take 1 tablet by mouth every 6 hours as needed (prn headache) (Patient not taking: Reported on 1/13/2020) 20 tablet 0    naproxen (NAPROSYN) 500 MG tablet Take 1 tablet by mouth 2 times daily (with meals) (Patient not taking: Reported on 12/18/2020) 20 tablet 0     No current facility-administered medications for this visit. LABS & TESTS:      Lab Results   Component Value Date    WBC 4.9 12/18/2020    HGB 16.6 12/18/2020    HCT 51.0 (H) 12/18/2020    MCV 91.2 12/18/2020     12/18/2020       REVIEW OF SYSTEMS:      CONSTITUTIONAL Weight change: present, Appetite change: absent, Fatigue: absent    HEENT Ears: tinnitus, Visual disturbance: absent    RESPIRATORY Shortness of breath: absent, Cough: absent    CARDIOVASCULAR Chest pain: absent, Leg swelling :absent    GI Constipation: absent, Diarrhea: absent, Swallowing change: absent     Urinary frequency: absent, Urinary urgency: absent, Urinary incontinence: absent    MUSCULOSKELETAL Neck pain: absent, Back pain: absent, Stiffness: absent, Muscle pain: absent, Joint pain: present Restless legs: absent    DERMATOLOGIC Hair loss: absent, Skin changes: absent    NEUROLOGIC Memory loss: absent, Confusion: absent, Seizures: absent Trouble walking or imbalance: absent, Dizziness: absent, Weakness: present, Numbness: present Tremor: absent, Spasm: absent, Speech difficulty: absent, Headache: present, Light sensitivity: absent    PSYCHIATRIC Anxiety: present, Hallucination: absent, Mood disorder: absent    HEMATOLOGIC Abnormal bleeding: absent, Anemia: absent, Clotting disorder: absent, Lymph gland changes: absent     VITALS  There were no vitals taken for this visit. Mental status   Alert.   Oriented to person, place, and time  Speech is fluent without paraphasic errors  Can do 1 step, 2 step, and cross-body commands  Good repetition and naming  Language appropriate  Can spell world backwards  No hallucinations or delusions   Cranial nerves   II - VFF, visual threat intact  III, IV, VI - extra-ocular muscles full: no pupillary defect; no SHEYLA, no nystagmus, no ptosis       V - sensation symmetric         VII -  No facial droop or NLF  VIII - intact hearing to conversational tone          IX, X - symmetrical palate elevation   XI - 5/5 strength  XII - tongue midline   Motor function  5/5 in b/l upper and lower extremity  Normal muscle bulk. No increased tone   Sensory function Symmetric to touch   Cerebellar No dysmetria or dysdiadochocinesia    Reflex function 2+ b/l symmetric in biceps, brachioradialis, patellar, calcaneal  babinski b/l downgoing   Gait                  Not assessed     IMAGING:       CT scan of head and cervical spine, xrays of chest abdomen, right hand and knee unremarkable. 10/29/2020     ASSESSMENT/Plan:      23 male with history of hypertension and migraine on verapamil, presenting with post concussion syndrome. Improving, counseled patient can take 6-9 months for improvement. Patient complaining of few days of severe headache on top of dull headache and wants to try amitriptyline in addition to the verapamil, for headache as well as neuropathic pain. We will start 25 mg at bedtime, plan for 2 to 3 months. Reviewed results of EMG nerve conduction study with the patient. Explained low suspicion of any nerve injury, symptoms possibly tendinous or ligamentous, may benefit from a orthopedic eval.  Referral placed. >50% of  face to face time spent counseling patient, multiple issues discussed, all questions answered. RTC in 3 months.       Lito Sloan MD

## 2021-01-11 ENCOUNTER — TELEPHONE (OUTPATIENT)
Dept: NEUROLOGY | Age: 26
End: 2021-01-11

## 2021-01-11 NOTE — TELEPHONE ENCOUNTER
[facility] requesting EMG report. The report was printed and faxed to [phone].

## 2021-01-13 DIAGNOSIS — I10 ESSENTIAL HYPERTENSION: ICD-10-CM

## 2021-01-13 NOTE — TELEPHONE ENCOUNTER
Nigel Patel is calling to request a refill on the following medication(s):    Last Visit Date (If Applicable):  63/30/8939    Next Visit Date:    Visit date not found    Medication Request:  Requested Prescriptions     Pending Prescriptions Disp Refills    verapamil (CALAN SR) 240 MG extended release tablet 30 tablet 5     Sig: TAKE 1 TABLET BY MOUTH AT BEDTIME

## 2021-01-14 RX ORDER — VERAPAMIL HYDROCHLORIDE 240 MG/1
TABLET, FILM COATED, EXTENDED RELEASE ORAL
Qty: 30 TABLET | Refills: 5 | Status: SHIPPED | OUTPATIENT
Start: 2021-01-14 | End: 2021-07-19

## 2021-02-18 RX ORDER — AMITRIPTYLINE HYDROCHLORIDE 25 MG/1
TABLET, FILM COATED ORAL
Qty: 30 TABLET | Refills: 1 | Status: SHIPPED | OUTPATIENT
Start: 2021-02-18 | End: 2021-03-10

## 2021-03-10 ENCOUNTER — OFFICE VISIT (OUTPATIENT)
Dept: NEUROLOGY | Age: 26
End: 2021-03-10
Payer: COMMERCIAL

## 2021-03-10 VITALS
WEIGHT: 210 LBS | SYSTOLIC BLOOD PRESSURE: 120 MMHG | HEART RATE: 99 BPM | BODY MASS INDEX: 31.93 KG/M2 | DIASTOLIC BLOOD PRESSURE: 76 MMHG

## 2021-03-10 DIAGNOSIS — R20.2 NUMBNESS AND TINGLING: Primary | ICD-10-CM

## 2021-03-10 DIAGNOSIS — R20.0 NUMBNESS AND TINGLING: Primary | ICD-10-CM

## 2021-03-10 PROCEDURE — 99214 OFFICE O/P EST MOD 30 MIN: CPT | Performed by: STUDENT IN AN ORGANIZED HEALTH CARE EDUCATION/TRAINING PROGRAM

## 2021-03-10 RX ORDER — AMITRIPTYLINE HYDROCHLORIDE 50 MG/1
50 TABLET, FILM COATED ORAL NIGHTLY
Qty: 30 TABLET | Refills: 2 | Status: SHIPPED | OUTPATIENT
Start: 2021-03-10 | End: 2021-06-22
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
## 2021-03-15 ENCOUNTER — TELEPHONE (OUTPATIENT)
Dept: NEUROLOGY | Age: 26
End: 2021-03-15

## 2021-06-22 RX ORDER — AMITRIPTYLINE HYDROCHLORIDE 50 MG/1
TABLET, FILM COATED ORAL
Qty: 30 TABLET | Refills: 0 | Status: SHIPPED | OUTPATIENT
Start: 2021-06-22 | End: 2021-07-07 | Stop reason: SDUPTHER

## 2021-07-07 ENCOUNTER — TELEMEDICINE (OUTPATIENT)
Dept: NEUROLOGY | Age: 26
End: 2021-07-07
Payer: COMMERCIAL

## 2021-07-07 DIAGNOSIS — M79.601 RIGHT ARM PAIN: Primary | ICD-10-CM

## 2021-07-07 PROCEDURE — 99213 OFFICE O/P EST LOW 20 MIN: CPT | Performed by: STUDENT IN AN ORGANIZED HEALTH CARE EDUCATION/TRAINING PROGRAM

## 2021-07-07 RX ORDER — AMITRIPTYLINE HYDROCHLORIDE 50 MG/1
75 TABLET, FILM COATED ORAL NIGHTLY
Qty: 30 TABLET | Refills: 3 | Status: SHIPPED | OUTPATIENT
Start: 2021-07-07 | End: 2021-10-06
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
## 2021-07-23 DIAGNOSIS — L50.9 HIVES: ICD-10-CM

## 2021-07-23 DIAGNOSIS — I10 ESSENTIAL HYPERTENSION: ICD-10-CM

## 2021-07-26 NOTE — TELEPHONE ENCOUNTER
Trevor Grant is calling to request a refill on the following medication(s):    Last Visit Date (If Applicable):  55/61/0538    Next Visit Date:    Visit date not found    Medication Request:  Requested Prescriptions     Pending Prescriptions Disp Refills    levocetirizine (XYZAL) 5 MG tablet 90 tablet 1     Sig: Take 1 tablet by mouth nightly    verapamil (CALAN SR) 240 MG extended release tablet 90 tablet 1     Sig: TAKE 1 TABLET BY MOUTH EVERYDAY AT BEDTIME

## 2021-07-27 RX ORDER — LEVOCETIRIZINE DIHYDROCHLORIDE 5 MG/1
5 TABLET, FILM COATED ORAL NIGHTLY
Qty: 90 TABLET | Refills: 1 | Status: SHIPPED | OUTPATIENT
Start: 2021-07-27 | End: 2022-01-17

## 2021-07-27 RX ORDER — VERAPAMIL HYDROCHLORIDE 240 MG/1
TABLET, FILM COATED, EXTENDED RELEASE ORAL
Qty: 90 TABLET | Refills: 1 | Status: SHIPPED | OUTPATIENT
Start: 2021-07-27 | End: 2022-05-03

## 2021-08-09 ENCOUNTER — PATIENT MESSAGE (OUTPATIENT)
Dept: FAMILY MEDICINE CLINIC | Age: 26
End: 2021-08-09

## 2021-08-09 DIAGNOSIS — F51.01 PRIMARY INSOMNIA: Primary | ICD-10-CM

## 2021-08-09 RX ORDER — TRAZODONE HYDROCHLORIDE 50 MG/1
50 TABLET ORAL NIGHTLY PRN
Qty: 30 TABLET | Refills: 5 | Status: SHIPPED | OUTPATIENT
Start: 2021-08-09 | End: 2021-10-06

## 2021-10-06 ENCOUNTER — TELEMEDICINE (OUTPATIENT)
Dept: NEUROLOGY | Age: 26
End: 2021-10-06
Payer: COMMERCIAL

## 2021-10-06 DIAGNOSIS — M79.601 RIGHT ARM PAIN: Primary | ICD-10-CM

## 2021-10-06 DIAGNOSIS — R20.2 NUMBNESS AND TINGLING: ICD-10-CM

## 2021-10-06 DIAGNOSIS — R20.0 NUMBNESS AND TINGLING: ICD-10-CM

## 2021-10-06 DIAGNOSIS — R20.0 NUMBNESS: ICD-10-CM

## 2021-10-06 DIAGNOSIS — F07.81 POST CONCUSSION SYNDROME: ICD-10-CM

## 2021-10-06 PROCEDURE — 99213 OFFICE O/P EST LOW 20 MIN: CPT | Performed by: STUDENT IN AN ORGANIZED HEALTH CARE EDUCATION/TRAINING PROGRAM

## 2021-10-06 RX ORDER — AMITRIPTYLINE HYDROCHLORIDE 75 MG/1
75 TABLET, FILM COATED ORAL NIGHTLY
Qty: 30 TABLET | Refills: 3 | Status: SHIPPED | OUTPATIENT
Start: 2021-10-06 | End: 2022-02-24
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
## 2021-11-30 NOTE — PROGRESS NOTES
I have discussed the case of Amy Fonseca, including pertinent history and exam findings with the resident. I have seen and examined the patient and the key elements of the encounter have been performed by me. I agree with the assessment, plan and orders as documented by the resident with changes made to the note as needed. Lab Results   Component Value Date    LDLCHOLESTEROL 122 12/18/2020     No components found for: CHLPL  Lab Results   Component Value Date    TRIG 83 12/18/2020    TRIG 142 10/11/2017     Lab Results   Component Value Date    HDL 38 (L) 12/18/2020    HDL 40 (L) 10/11/2017     No results found for: LDLCALC  No results found for: LABVLDL  No results found for: LABA1C  No results found for: EAG  No results found for: Dmitry Echevarria   Neurological work up:  CT head  CTA head and neck  MRI brain   2 D echo         Angela Forbes MD  Neurology    This note is created with the assistance of a speech-recognition program. While intending to generate a document that actually reflects the content of the visit, the document can still have some errors including those of syntax and sound a- like substitutions which may escape proofreading. In such instances, actual meaning can be extrapolated by contextual derivation.

## 2022-01-15 DIAGNOSIS — L50.9 HIVES: ICD-10-CM

## 2022-01-17 RX ORDER — LEVOCETIRIZINE DIHYDROCHLORIDE 5 MG/1
TABLET, FILM COATED ORAL
Qty: 90 TABLET | Refills: 1 | Status: SHIPPED | OUTPATIENT
Start: 2022-01-17 | End: 2022-08-08

## 2022-01-17 NOTE — TELEPHONE ENCOUNTER
Carlito Moreno is calling to request a refill on the following medication(s):    Last Visit Date (If Applicable):  60/26/6412    Next Visit Date:    Visit date not found    Medication Request:  Requested Prescriptions     Pending Prescriptions Disp Refills    levocetirizine (XYZAL) 5 MG tablet [Pharmacy Med Name: LEVOCETIRIZINE 5 MG TABLET] 90 tablet 1     Sig: TAKE 1 TABLET BY MOUTH EVERY DAY AT NIGHT

## 2022-02-24 RX ORDER — AMITRIPTYLINE HYDROCHLORIDE 75 MG/1
TABLET, FILM COATED ORAL
Qty: 30 TABLET | Refills: 3 | Status: SHIPPED | OUTPATIENT
Start: 2022-02-24

## 2022-05-02 DIAGNOSIS — I10 ESSENTIAL HYPERTENSION: ICD-10-CM

## 2022-05-03 RX ORDER — VERAPAMIL HYDROCHLORIDE 240 MG/1
TABLET, FILM COATED, EXTENDED RELEASE ORAL
Qty: 90 TABLET | Refills: 1 | Status: SHIPPED | OUTPATIENT
Start: 2022-05-03

## 2022-05-03 NOTE — TELEPHONE ENCOUNTER
Octavia Gilliland is calling to request a refill on the following medication(s):    Last Visit Date (If Applicable):  58/82/4346    Next Visit Date:    5/3/2022    Medication Request:  Requested Prescriptions     Pending Prescriptions Disp Refills    verapamil (CALAN SR) 240 MG extended release tablet [Pharmacy Med Name: VERAPAMIL  MG TABLET] 90 tablet 1     Sig: TAKE 1 TABLET BY MOUTH EVERYDAY AT BEDTIME

## 2022-07-13 RX ORDER — AMITRIPTYLINE HYDROCHLORIDE 75 MG/1
TABLET, FILM COATED ORAL
Qty: 30 TABLET | Refills: 3 | OUTPATIENT
Start: 2022-07-13

## 2022-08-08 DIAGNOSIS — L50.9 HIVES: ICD-10-CM

## 2022-08-08 RX ORDER — LEVOCETIRIZINE DIHYDROCHLORIDE 5 MG/1
TABLET, FILM COATED ORAL
Qty: 90 TABLET | Refills: 1 | Status: SHIPPED | OUTPATIENT
Start: 2022-08-08

## 2022-08-08 NOTE — TELEPHONE ENCOUNTER
Patient called and stated that he is no longer coming to this office and the pharmacy sent the medication to the wrong office. Writer tried to cancel the request and the patient will call the pharmacy and let them know.

## 2022-08-08 NOTE — TELEPHONE ENCOUNTER
Flory Godoy is calling to request a refill on the following medication(s):    Last Visit Date (If Applicable):  22/29/0224    Next Visit Date:    Visit date not found    Medication Request:  Requested Prescriptions     Pending Prescriptions Disp Refills    levocetirizine (XYZAL) 5 MG tablet [Pharmacy Med Name: LEVOCETIRIZINE 5 MG TABLET] 90 tablet 1     Sig: TAKE 1 TABLET BY MOUTH EVERY DAY AT NIGHT           LVM for patient to give the office a call and schedule an appointment.

## 2023-05-26 ENCOUNTER — LAB (OUTPATIENT)
Dept: LAB | Facility: LAB | Age: 28
End: 2023-05-26
Payer: COMMERCIAL

## 2023-05-26 ENCOUNTER — OFFICE VISIT (OUTPATIENT)
Dept: PRIMARY CARE | Facility: CLINIC | Age: 28
End: 2023-05-26
Payer: COMMERCIAL

## 2023-05-26 VITALS
HEART RATE: 73 BPM | BODY MASS INDEX: 34.53 KG/M2 | HEIGHT: 67 IN | WEIGHT: 220 LBS | DIASTOLIC BLOOD PRESSURE: 84 MMHG | SYSTOLIC BLOOD PRESSURE: 126 MMHG | TEMPERATURE: 97.9 F | OXYGEN SATURATION: 96 %

## 2023-05-26 DIAGNOSIS — I10 ESSENTIAL HYPERTENSION: Primary | ICD-10-CM

## 2023-05-26 DIAGNOSIS — M54.12 RIGHT CERVICAL RADICULOPATHY: ICD-10-CM

## 2023-05-26 DIAGNOSIS — Z13.220 LIPID SCREENING: ICD-10-CM

## 2023-05-26 DIAGNOSIS — I10 ESSENTIAL HYPERTENSION: ICD-10-CM

## 2023-05-26 DIAGNOSIS — Z13.1 SCREENING FOR DIABETES MELLITUS: ICD-10-CM

## 2023-05-26 DIAGNOSIS — J30.1 ALLERGIC RHINITIS DUE TO POLLEN, UNSPECIFIED SEASONALITY: ICD-10-CM

## 2023-05-26 DIAGNOSIS — E66.09 CLASS 1 OBESITY DUE TO EXCESS CALORIES WITH BODY MASS INDEX (BMI) OF 34.0 TO 34.9 IN ADULT, UNSPECIFIED WHETHER SERIOUS COMORBIDITY PRESENT: ICD-10-CM

## 2023-05-26 PROBLEM — J30.9 ALLERGIC RHINITIS: Status: ACTIVE | Noted: 2022-06-03

## 2023-05-26 PROBLEM — F32.A MILD DEPRESSION: Status: ACTIVE | Noted: 2022-01-06

## 2023-05-26 PROBLEM — E55.9 VITAMIN D DEFICIENCY: Status: ACTIVE | Noted: 2022-06-03

## 2023-05-26 PROBLEM — F41.1 GENERALIZED ANXIETY DISORDER: Status: ACTIVE | Noted: 2022-01-06

## 2023-05-26 PROBLEM — G43.009 MIGRAINE WITHOUT AURA, NOT REFRACTORY: Status: ACTIVE | Noted: 2022-01-06

## 2023-05-26 LAB
ALANINE AMINOTRANSFERASE (SGPT) (U/L) IN SER/PLAS: 27 U/L (ref 10–52)
ALBUMIN (G/DL) IN SER/PLAS: 4.4 G/DL (ref 3.4–5)
ALKALINE PHOSPHATASE (U/L) IN SER/PLAS: 114 U/L (ref 33–120)
ANION GAP IN SER/PLAS: 13 MMOL/L (ref 10–20)
ASPARTATE AMINOTRANSFERASE (SGOT) (U/L) IN SER/PLAS: 14 U/L (ref 9–39)
BILIRUBIN TOTAL (MG/DL) IN SER/PLAS: 0.7 MG/DL (ref 0–1.2)
CALCIUM (MG/DL) IN SER/PLAS: 9.1 MG/DL (ref 8.6–10.6)
CARBON DIOXIDE, TOTAL (MMOL/L) IN SER/PLAS: 25 MMOL/L (ref 21–32)
CHLORIDE (MMOL/L) IN SER/PLAS: 109 MMOL/L (ref 98–107)
CHOLESTEROL (MG/DL) IN SER/PLAS: 154 MG/DL (ref 0–199)
CHOLESTEROL IN HDL (MG/DL) IN SER/PLAS: 37 MG/DL
CHOLESTEROL/HDL RATIO: 4.2
CREATININE (MG/DL) IN SER/PLAS: 0.76 MG/DL (ref 0.5–1.3)
ESTIMATED AVERAGE GLUCOSE FOR HBA1C: 91 MG/DL
GFR MALE: >90 ML/MIN/1.73M2
GLUCOSE (MG/DL) IN SER/PLAS: 92 MG/DL (ref 74–99)
HEMOGLOBIN A1C/HEMOGLOBIN TOTAL IN BLOOD: 4.8 %
LDL: 97 MG/DL (ref 0–119)
POTASSIUM (MMOL/L) IN SER/PLAS: 4.4 MMOL/L (ref 3.5–5.3)
PROTEIN TOTAL: 6.7 G/DL (ref 6.4–8.2)
SODIUM (MMOL/L) IN SER/PLAS: 143 MMOL/L (ref 136–145)
TRIGLYCERIDE (MG/DL) IN SER/PLAS: 102 MG/DL (ref 0–149)
UREA NITROGEN (MG/DL) IN SER/PLAS: 14 MG/DL (ref 6–23)
VLDL: 20 MG/DL (ref 0–40)

## 2023-05-26 PROCEDURE — 3074F SYST BP LT 130 MM HG: CPT | Performed by: FAMILY MEDICINE

## 2023-05-26 PROCEDURE — 3008F BODY MASS INDEX DOCD: CPT | Performed by: FAMILY MEDICINE

## 2023-05-26 PROCEDURE — 80053 COMPREHEN METABOLIC PANEL: CPT

## 2023-05-26 PROCEDURE — 36415 COLL VENOUS BLD VENIPUNCTURE: CPT

## 2023-05-26 PROCEDURE — 3079F DIAST BP 80-89 MM HG: CPT | Performed by: FAMILY MEDICINE

## 2023-05-26 PROCEDURE — 1036F TOBACCO NON-USER: CPT | Performed by: FAMILY MEDICINE

## 2023-05-26 PROCEDURE — 83036 HEMOGLOBIN GLYCOSYLATED A1C: CPT

## 2023-05-26 PROCEDURE — 80061 LIPID PANEL: CPT

## 2023-05-26 PROCEDURE — 99214 OFFICE O/P EST MOD 30 MIN: CPT | Performed by: FAMILY MEDICINE

## 2023-05-26 RX ORDER — ERGOCALCIFEROL 1.25 MG/1
50000 CAPSULE ORAL
COMMUNITY
Start: 2022-01-07 | End: 2023-11-07 | Stop reason: ALTCHOICE

## 2023-05-26 RX ORDER — VERAPAMIL HYDROCHLORIDE 240 MG/1
240 TABLET, FILM COATED, EXTENDED RELEASE ORAL NIGHTLY
Qty: 90 TABLET | Refills: 1 | Status: SHIPPED | OUTPATIENT
Start: 2023-05-26 | End: 2023-11-07 | Stop reason: SDUPTHER

## 2023-05-26 RX ORDER — LORATADINE 10 MG/1
10 TABLET ORAL DAILY
Qty: 90 TABLET | Refills: 1 | Status: SHIPPED | OUTPATIENT
Start: 2023-05-26 | End: 2023-11-07 | Stop reason: ALTCHOICE

## 2023-05-26 RX ORDER — VERAPAMIL HYDROCHLORIDE 240 MG/1
240 TABLET, FILM COATED, EXTENDED RELEASE ORAL NIGHTLY
COMMUNITY
Start: 2022-01-06 | End: 2023-05-26 | Stop reason: SDUPTHER

## 2023-05-26 RX ORDER — POLYETHYLENE GLYCOL 3350 17 G/17G
17 POWDER, FOR SOLUTION ORAL 2 TIMES DAILY
COMMUNITY
Start: 2022-12-02 | End: 2023-11-07 | Stop reason: ALTCHOICE

## 2023-05-26 ASSESSMENT — ENCOUNTER SYMPTOMS
SHORTNESS OF BREATH: 0
WOUND: 1

## 2023-05-26 ASSESSMENT — PATIENT HEALTH QUESTIONNAIRE - PHQ9
2. FEELING DOWN, DEPRESSED OR HOPELESS: NOT AT ALL
SUM OF ALL RESPONSES TO PHQ9 QUESTIONS 1 AND 2: 0
1. LITTLE INTEREST OR PLEASURE IN DOING THINGS: NOT AT ALL

## 2023-05-26 NOTE — PATIENT INSTRUCTIONS
HTN: Meds filled  Class I obesity: Encourage diet modification, exercising 30 minutes daily  Preventative: We will order screening labs  Allergic rhinitis: Claritin 10 mg ordered, advised to try over-the-counter Flonase

## 2023-05-26 NOTE — PROGRESS NOTES
Assessment     ASSESSMENT/PLAN:      Problem List Items Addressed This Visit          Nervous    Right cervical radiculopathy       Circulatory    Essential hypertension - Primary    Relevant Medications    verapamil SR (Calan-SR) 240 mg ER tablet    Other Relevant Orders    Comprehensive Metabolic Panel       Other    Allergic rhinitis    Relevant Medications    loratadine (Claritin) 10 mg tablet     Other Visit Diagnoses       Lipid screening        Relevant Orders    Lipid panel    Screening for diabetes mellitus        Relevant Orders    Hemoglobin A1c    Class 1 obesity due to excess calories with body mass index (BMI) of 34.0 to 34.9 in adult, unspecified whether serious comorbidity present                Patient Instructions:  Patient Instructions   HTN: Meds filled  Class I obesity: Encourage diet modification, exercising 30 minutes daily  Preventative: We will order screening labs  Allergic rhinitis: Claritin 10 mg ordered, advised to try over-the-counter Flonase        Signed by: Martha Mcintosh DO       FUTURE DIRECTION:       Subjective   SUBJECTIVE:     HPI : Patient is a 27 y.o. male who presents today for the following:     Recently moved here from [place], has been here for the past year   States his immediate family lives in [place]     Depression/Anxiety   has history of depression since parent  at age 14   Was at one point diagnosed with ADHD and was treated with concerta, that did not make symptoms better  Also was given depression medication like trazodone and prozac but that made feel very sick to his stomach   Has tried behavorial therapy for 3 months but it did not improve symptoms   Not currently on any medication       Left hand Paresthesia 2/2 to cervical radiculopathy  Follows pain management, had EMG and MRI done, + annular tear in C4-C5  - s/p cervical injection  and will get another injection in 1 week   - would like to avoid surgery for now     Review of Systems    Respiratory:  Negative for shortness of breath.    Cardiovascular:  Negative for chest pain.   Skin:  Positive for wound.       Past Medical History:   Diagnosis Date    Personal history of other specified conditions 12/08/2021    History of fever    Right lower quadrant pain 12/08/2021    Abdominal pain, acute, right lower quadrant        History reviewed. No pertinent surgical history.     Current Outpatient Medications   Medication Instructions    ergocalciferol (VITAMIN D-2) 50,000 Units, oral, Weekly    loratadine (CLARITIN) 10 mg, oral, Daily    polyethylene glycol (GLYCOLAX) 17 g, oral, 2 times daily    verapamil SR (CALAN-SR) 240 mg, oral, Nightly        Allergies   Allergen Reactions    Other Other     Seasonal    Sulfa (Sulfonamide Antibiotics) Other        Social History     Socioeconomic History    Marital status: Single     Spouse name: Not on file    Number of children: Not on file    Years of education: Not on file    Highest education level: Not on file   Occupational History    Not on file   Tobacco Use    Smoking status: Never    Smokeless tobacco: Never   Vaping Use    Vaping status: Never Used   Substance and Sexual Activity    Alcohol use: Yes     Alcohol/week: 1.0 - 2.0 standard drink of alcohol     Types: 1 - 2 Cans of beer per week    Drug use: Not on file    Sexual activity: Not on file   Other Topics Concern    Not on file   Social History Narrative    Not on file     Social Determinants of Health     Financial Resource Strain: Not on file   Food Insecurity: Not on file   Transportation Needs: Not on file   Physical Activity: Not on file   Stress: Not on file   Social Connections: Not on file   Intimate Partner Violence: Not on file   Housing Stability: Not on file        Family History   Problem Relation Name Age of Onset    Hypertension Mother      Graves' disease Mother      Paranoid behavior Father      Anxiety disorder Father      Migraines Father      Other (hld) Father       "Migraines Sister      Thyroid disease Sister      Migraines Brother      Asperger's syndrome Brother      Thyroid disease Maternal Grandfather      Other (bca) Maternal Grandfather      Diabetes type II Maternal Grandfather      Muscular dystrophy Maternal Grandfather      Muscular dystrophy Paternal Grandfather      Melanoma Paternal Grandfather          Objective     OBJECTIVE:     Vitals:    05/26/23 0825   BP: 126/84   Pulse: 73   Temp: 36.6 °C (97.9 °F)   SpO2: 96%   Weight: 99.8 kg (220 lb)   Height: 1.702 m (5' 7\")        Physical Exam  HENT:      Head: Normocephalic and atraumatic.      Nose: Nose normal.      Mouth/Throat:      Mouth: Mucous membranes are moist.   Eyes:      Pupils: Pupils are equal, round, and reactive to light.   Cardiovascular:      Rate and Rhythm: Normal rate and regular rhythm.      Pulses: Normal pulses.      Heart sounds: No murmur heard.  Pulmonary:      Effort: Pulmonary effort is normal.      Breath sounds: Normal breath sounds.   Abdominal:      Tenderness: There is no abdominal tenderness.   Musculoskeletal:         General: Normal range of motion.      Cervical back: Normal range of motion.   Skin:     General: Skin is warm and dry.   Neurological:      Mental Status: He is alert.   Psychiatric:         Mood and Affect: Mood normal.               "

## 2023-05-30 ENCOUNTER — TELEPHONE (OUTPATIENT)
Dept: PRIMARY CARE | Facility: CLINIC | Age: 28
End: 2023-05-30
Payer: COMMERCIAL

## 2023-05-30 NOTE — TELEPHONE ENCOUNTER
----- Message from Martha Mcintosh DO sent at 5/30/2023  8:05 AM EDT -----  Please let patient know that labs are normal except for HDL which is slightly low would like it above 40.  HDL should improve with exercise.

## 2023-06-05 ENCOUNTER — HOSPITAL ENCOUNTER (OUTPATIENT)
Dept: DATA CONVERSION | Facility: HOSPITAL | Age: 28
End: 2023-06-05
Attending: PHYSICAL MEDICINE & REHABILITATION | Admitting: PHYSICAL MEDICINE & REHABILITATION
Payer: COMMERCIAL

## 2023-06-05 DIAGNOSIS — M54.12 RADICULOPATHY, CERVICAL REGION: ICD-10-CM

## 2023-09-30 NOTE — H&P
History & Physical Reviewed:   I have reviewed the History and Physical dated:  05-Jun-2023   History and Physical reviewed and relevant findings noted. Patient examined to review pertinent physical  findings.: No significant changes   Home Medications Reviewed: no changes noted   Allergies Reviewed: no changes noted       ERAS (Enhanced Recovery After Surgery):  ·  ERAS Patient: no     Consent:   COVID-19 Consent:  ·  COVID-19 Risk Consent Surgeon has reviewed key risks related to the risk of hany COVID-19 and if they contract COVID-19 what the risks are.       Electronic Signatures:  Fletcher Hoskins)  (Signed 05-Jun-2023 08:23)   Authored: History & Physical Reviewed, ERAS, Consent,  Note Completion      Last Updated: 05-Jun-2023 08:23 by Fletcher Hoskins)

## 2023-11-07 ENCOUNTER — OFFICE VISIT (OUTPATIENT)
Dept: PRIMARY CARE | Facility: CLINIC | Age: 28
End: 2023-11-07
Payer: COMMERCIAL

## 2023-11-07 VITALS
HEART RATE: 63 BPM | OXYGEN SATURATION: 97 % | SYSTOLIC BLOOD PRESSURE: 116 MMHG | DIASTOLIC BLOOD PRESSURE: 84 MMHG | WEIGHT: 224 LBS | TEMPERATURE: 97.5 F | BODY MASS INDEX: 35.08 KG/M2

## 2023-11-07 DIAGNOSIS — E66.09 CLASS 2 OBESITY DUE TO EXCESS CALORIES WITH BODY MASS INDEX (BMI) OF 35.0 TO 35.9 IN ADULT, UNSPECIFIED WHETHER SERIOUS COMORBIDITY PRESENT: ICD-10-CM

## 2023-11-07 DIAGNOSIS — Z00.00 ROUTINE GENERAL MEDICAL EXAMINATION AT A HEALTH CARE FACILITY: ICD-10-CM

## 2023-11-07 DIAGNOSIS — L64.9 MALE PATTERN ALOPECIA: ICD-10-CM

## 2023-11-07 DIAGNOSIS — M54.12 RIGHT CERVICAL RADICULOPATHY: Primary | ICD-10-CM

## 2023-11-07 DIAGNOSIS — F41.1 GENERALIZED ANXIETY DISORDER: ICD-10-CM

## 2023-11-07 DIAGNOSIS — I10 ESSENTIAL HYPERTENSION: ICD-10-CM

## 2023-11-07 DIAGNOSIS — F32.A MILD DEPRESSION: ICD-10-CM

## 2023-11-07 DIAGNOSIS — J30.1 ALLERGIC RHINITIS DUE TO POLLEN, UNSPECIFIED SEASONALITY: ICD-10-CM

## 2023-11-07 PROCEDURE — 3079F DIAST BP 80-89 MM HG: CPT | Performed by: FAMILY MEDICINE

## 2023-11-07 PROCEDURE — 1036F TOBACCO NON-USER: CPT | Performed by: FAMILY MEDICINE

## 2023-11-07 PROCEDURE — 99214 OFFICE O/P EST MOD 30 MIN: CPT | Performed by: FAMILY MEDICINE

## 2023-11-07 PROCEDURE — 3008F BODY MASS INDEX DOCD: CPT | Performed by: FAMILY MEDICINE

## 2023-11-07 PROCEDURE — 3074F SYST BP LT 130 MM HG: CPT | Performed by: FAMILY MEDICINE

## 2023-11-07 PROCEDURE — 99395 PREV VISIT EST AGE 18-39: CPT | Performed by: FAMILY MEDICINE

## 2023-11-07 RX ORDER — VERAPAMIL HYDROCHLORIDE 240 MG/1
240 TABLET, FILM COATED, EXTENDED RELEASE ORAL NIGHTLY
Qty: 90 TABLET | Refills: 1 | Status: SHIPPED | OUTPATIENT
Start: 2023-11-07 | End: 2024-05-30 | Stop reason: SDUPTHER

## 2023-11-07 RX ORDER — LEVOCETIRIZINE DIHYDROCHLORIDE 5 MG/1
5 TABLET, FILM COATED ORAL EVERY EVENING
Qty: 30 TABLET | Refills: 0 | Status: SHIPPED | OUTPATIENT
Start: 2023-11-07 | End: 2023-12-29 | Stop reason: WASHOUT

## 2023-11-07 NOTE — PROGRESS NOTES
Assessment     ASSESSMENT/PLAN:      Problem List Items Addressed This Visit       Allergic rhinitis    Relevant Medications    levocetirizine (Xyzal) 5 mg tablet    Other Relevant Orders    Referral to Allergy    Essential hypertension    Relevant Medications    verapamil SR (Calan-SR) 240 mg ER tablet    Other Relevant Orders    Basic metabolic panel    Generalized anxiety disorder    Relevant Orders    Referral to Psychiatry    Mild depression    Relevant Orders    Referral to Psychiatry    Right cervical radiculopathy - Primary    Relevant Orders    Referral to Orthopaedic Surgery     Other Visit Diagnoses       Male pattern alopecia        Relevant Orders    Referral to Dermatology    Routine general medical examination at a health care facility        Class 2 obesity due to excess calories with body mass index (BMI) of 35.0 to 35.9 in adult, unspecified whether serious comorbidity present                Patient Instructions:  There are no Patient Instructions on file for this visit.      Signed by: Martha Mcintosh DO       FUTURE DIRECTION:   []    Subjective   SUBJECTIVE:     HPI : Patient is a 28 y.o. male who presents today for the following:     Depression/Anxiety   has history of depression since parent  at age 14   Was at one point diagnosed with ADHD and was treated with concerta, that did not make symptoms better  Also was given depression medication like trazodone and prozac but that made feel very sick to his stomach   Has tried behavorial therapy for 3 months but it did not improve symptoms   Not currently on any medication, was advised by therapist to go to psychiatrist         Left hand Paresthesia 2/2 to cervical radiculopathy  Follows pain management, had EMG and MRI done, + annular tear in C4-C5  - s/p cervical injection  but is know noticing back pain which is new   - would like to avoid surgery for now     Nasal congestion  - no improvement with claritin   - mentons history  of severe allergy in childhood, requiring allergy injections     Review of Systems    Past Medical History:   Diagnosis Date    Personal history of other specified conditions 12/08/2021    History of fever    Right lower quadrant pain 12/08/2021    Abdominal pain, acute, right lower quadrant        History reviewed. No pertinent surgical history.     Current Outpatient Medications   Medication Instructions    levocetirizine (XYZAL) 5 mg, oral, Every evening    verapamil SR (CALAN-SR) 240 mg, oral, Nightly        Allergies   Allergen Reactions    Other Other     Seasonal    Sulfa (Sulfonamide Antibiotics) Other        Social History     Socioeconomic History    Marital status: Single     Spouse name: Not on file    Number of children: Not on file    Years of education: Not on file    Highest education level: Not on file   Occupational History    Not on file   Tobacco Use    Smoking status: Never    Smokeless tobacco: Never   Vaping Use    Vaping Use: Never used   Substance and Sexual Activity    Alcohol use: Yes     Alcohol/week: 1.0 - 2.0 standard drink of alcohol     Types: 1 - 2 Cans of beer per week    Drug use: Not on file    Sexual activity: Not on file   Other Topics Concern    Not on file   Social History Narrative    Not on file     Social Determinants of Health     Financial Resource Strain: Not on file   Food Insecurity: Not on file   Transportation Needs: Not on file   Physical Activity: Not on file   Stress: Not on file   Social Connections: Not on file   Intimate Partner Violence: Not on file   Housing Stability: Not on file        Family History   Problem Relation Name Age of Onset    Hypertension Mother      Graves' disease Mother      Paranoid behavior Father      Anxiety disorder Father      Migraines Father      Other (hld) Father      Migraines Sister      Thyroid disease Sister      Migraines Brother      Asperger's syndrome Brother      Thyroid disease Maternal Grandfather      Other (bca)  Maternal Grandfather      Diabetes type II Maternal Grandfather      Muscular dystrophy Maternal Grandfather      Muscular dystrophy Paternal Grandfather      Melanoma Paternal Grandfather          Objective     OBJECTIVE:     Vitals:    11/07/23 1608   BP: 116/84   Pulse: 63   Temp: 36.4 °C (97.5 °F)   SpO2: 97%   Weight: 102 kg (224 lb)        Physical Exam  Constitutional:       Appearance: He is obese.   HENT:      Head: Normocephalic and atraumatic.      Nose: Nose normal.      Mouth/Throat:      Mouth: Mucous membranes are moist.   Eyes:      Pupils: Pupils are equal, round, and reactive to light.   Cardiovascular:      Rate and Rhythm: Normal rate and regular rhythm.      Pulses: Normal pulses.      Heart sounds: No murmur heard.  Pulmonary:      Effort: Pulmonary effort is normal.      Breath sounds: Normal breath sounds.   Abdominal:      Tenderness: There is no abdominal tenderness.   Musculoskeletal:         General: Normal range of motion.      Cervical back: Normal range of motion.   Skin:     General: Skin is warm and dry.   Neurological:      Mental Status: He is alert.   Psychiatric:         Mood and Affect: Mood normal.

## 2023-11-16 ENCOUNTER — APPOINTMENT (OUTPATIENT)
Dept: PRIMARY CARE | Facility: CLINIC | Age: 28
End: 2023-11-16

## 2023-11-17 ENCOUNTER — APPOINTMENT (OUTPATIENT)
Dept: PRIMARY CARE | Facility: CLINIC | Age: 28
End: 2023-11-17

## 2023-11-27 ENCOUNTER — ANCILLARY PROCEDURE (OUTPATIENT)
Dept: RADIOLOGY | Facility: CLINIC | Age: 28
End: 2023-11-27
Payer: COMMERCIAL

## 2023-11-27 ENCOUNTER — OFFICE VISIT (OUTPATIENT)
Dept: ORTHOPEDIC SURGERY | Facility: CLINIC | Age: 28
End: 2023-11-27
Payer: COMMERCIAL

## 2023-11-27 VITALS — BODY MASS INDEX: 35.16 KG/M2 | WEIGHT: 224 LBS | HEIGHT: 67 IN

## 2023-11-27 DIAGNOSIS — M54.2 CERVICAL PAIN: ICD-10-CM

## 2023-11-27 DIAGNOSIS — M77.11 LATERAL EPICONDYLITIS OF RIGHT ELBOW: Primary | ICD-10-CM

## 2023-11-27 DIAGNOSIS — M54.12 RIGHT CERVICAL RADICULOPATHY: ICD-10-CM

## 2023-11-27 DIAGNOSIS — M79.601 RIGHT ARM PAIN: ICD-10-CM

## 2023-11-27 PROCEDURE — 99213 OFFICE O/P EST LOW 20 MIN: CPT

## 2023-11-27 PROCEDURE — 1036F TOBACCO NON-USER: CPT

## 2023-11-27 PROCEDURE — 3008F BODY MASS INDEX DOCD: CPT

## 2023-11-27 PROCEDURE — 99203 OFFICE O/P NEW LOW 30 MIN: CPT

## 2023-11-27 ASSESSMENT — PAIN SCALES - GENERAL: PAINLEVEL_OUTOF10: 4

## 2023-11-27 ASSESSMENT — PAIN DESCRIPTION - DESCRIPTORS: DESCRIPTORS: SHARP;SHOOTING;BURNING

## 2023-11-27 ASSESSMENT — PAIN - FUNCTIONAL ASSESSMENT: PAIN_FUNCTIONAL_ASSESSMENT: 0-10

## 2023-11-27 NOTE — PROGRESS NOTES
HPI:  Patient is a 28-year-old male who presents today with a 3-year worsening history of right arm pain that is relatively constant.  He states he is a stabbing pain on the lateral side of his forearm, that goes down into his hand little finger.  He states his pain is worse with repetitive movements.  He states the cold additionally makes his symptoms worse.  He states he has a history of a C4/5 annular tear and physical therapy, as well as 2 epidural injections, which all made his symptoms worse.  He states he is tried gabapentin in the past which did not help his symptoms. He is at a constant 3/10 pain. He states that he had an EMG done which showed nerve irritation in his arm and not his neck.  He states that he gets relief when he puts pressure around his radial nerve.  He denies numbness and tingling.  He denies balance or coronation issues.  No other questions or concerns at time of visit.    ROS:  Reviewed on EMR and patient intake sheet.    PMH/SH:  Reviewed on EMR and patient intake sheet.    Exam:  MSK: Full strength and range of motion on phlebectomies bilaterally.  Negative Rusty's, negative Spurling's.  General: No acute distress. Awake and conversant.  Eyes: Normal conjunctiva, anicteric. Round symmetric pupils.  ENT: Hearing grossly intact. No nasal discharge.  Neck: Neck is supple. No masses or thyromegaly.  Respiratory: Respirations are non-labored. No wheezing.  Skin: Warm. No rashes or ulcers.  Psych: Alert and oriented. Cooperative, appropriate mood and affect, normal judgement.  CV: No lower extremity edema.  Neuro: Sensation and CN II-XII grossly normal.    Radiology:     Most recent MRI from 2/20/2023 personally reviewed and demonstrates midline annular tear at C4/5.  No evidence of canal or foraminal stenosis.  Normal alignment of cervical spine.  No acute fractures or dislocations.    Diagnosis:    Tennis elbow, right    Assessment and Plan:  Patient was seen today and evaluated for  right arm pain that is progressively worsened and become somewhat constant over the past 3 years.  We discussed conservative management of over-the-counter pain medication such as NSAIDs, physical therapy, ice, and activity modification.  I do not believe his symptoms are coming from his neck or associated with a cervical radiculopathy, despite the annular tear at C4/5.  Based on the MRI, the patient has minimal to no canal or foraminal stenosis.  I recommended that the patient follow-up with a sports provider to have a more in-depth evaluation of his right arm.  Patient feels all questions were properly answered at time of visit today.  Patient agrees to the plan above.    This note was dictated using speech recognition software and was not corrected for spelling or grammatical errors    Daniel Martin PA-C  Department of Orthopaedic Surgery  8:46 AM  11/27/23    [address] [place] [address]    Voicemail: [phone]   Appts: [phone]  Fax: [phone]

## 2023-12-04 ENCOUNTER — HOSPITAL ENCOUNTER (OUTPATIENT)
Dept: RADIOLOGY | Facility: HOSPITAL | Age: 28
Discharge: HOME | End: 2023-12-04
Payer: COMMERCIAL

## 2023-12-04 ENCOUNTER — OFFICE VISIT (OUTPATIENT)
Dept: ORTHOPEDIC SURGERY | Facility: HOSPITAL | Age: 28
End: 2023-12-04
Payer: COMMERCIAL

## 2023-12-04 VITALS — WEIGHT: 220 LBS | HEIGHT: 68 IN | BODY MASS INDEX: 33.34 KG/M2

## 2023-12-04 DIAGNOSIS — M79.601 RIGHT ARM PAIN: ICD-10-CM

## 2023-12-04 DIAGNOSIS — M25.521 RIGHT ELBOW PAIN: ICD-10-CM

## 2023-12-04 DIAGNOSIS — M77.11 LATERAL EPICONDYLITIS OF RIGHT ELBOW: Primary | ICD-10-CM

## 2023-12-04 PROCEDURE — 99213 OFFICE O/P EST LOW 20 MIN: CPT | Performed by: EMERGENCY MEDICINE

## 2023-12-04 PROCEDURE — 99203 OFFICE O/P NEW LOW 30 MIN: CPT | Performed by: EMERGENCY MEDICINE

## 2023-12-04 PROCEDURE — 1036F TOBACCO NON-USER: CPT | Performed by: EMERGENCY MEDICINE

## 2023-12-04 PROCEDURE — 3008F BODY MASS INDEX DOCD: CPT | Performed by: EMERGENCY MEDICINE

## 2023-12-04 PROCEDURE — 73080 X-RAY EXAM OF ELBOW: CPT | Mod: RIGHT SIDE | Performed by: RADIOLOGY

## 2023-12-04 PROCEDURE — 73080 X-RAY EXAM OF ELBOW: CPT | Mod: RT,FY

## 2023-12-04 ASSESSMENT — PAIN DESCRIPTION - DESCRIPTORS: DESCRIPTORS: ACHING;SHARP;SHOOTING

## 2023-12-04 ASSESSMENT — PAIN - FUNCTIONAL ASSESSMENT: PAIN_FUNCTIONAL_ASSESSMENT: 0-10

## 2023-12-04 ASSESSMENT — PAIN SCALES - GENERAL: PAINLEVEL_OUTOF10: 6
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
## 2023-12-22 ENCOUNTER — APPOINTMENT (OUTPATIENT)
Dept: RADIOLOGY | Facility: HOSPITAL | Age: 28
End: 2023-12-22
Payer: COMMERCIAL

## 2023-12-29 ENCOUNTER — HOSPITAL ENCOUNTER (OUTPATIENT)
Dept: RADIOLOGY | Facility: HOSPITAL | Age: 28
Discharge: HOME | End: 2023-12-29
Payer: COMMERCIAL

## 2023-12-29 ENCOUNTER — OFFICE VISIT (OUTPATIENT)
Dept: PRIMARY CARE | Facility: CLINIC | Age: 28
End: 2023-12-29
Payer: COMMERCIAL

## 2023-12-29 VITALS
DIASTOLIC BLOOD PRESSURE: 70 MMHG | SYSTOLIC BLOOD PRESSURE: 118 MMHG | BODY MASS INDEX: 35.12 KG/M2 | HEART RATE: 63 BPM | OXYGEN SATURATION: 96 % | WEIGHT: 231 LBS | TEMPERATURE: 97.3 F

## 2023-12-29 DIAGNOSIS — R07.0 THROAT PAIN: Primary | ICD-10-CM

## 2023-12-29 DIAGNOSIS — R10.84 ABDOMINAL PAIN, GENERALIZED: ICD-10-CM

## 2023-12-29 DIAGNOSIS — M77.11 LATERAL EPICONDYLITIS OF RIGHT ELBOW: ICD-10-CM

## 2023-12-29 PROCEDURE — 74018 RADEX ABDOMEN 1 VIEW: CPT

## 2023-12-29 PROCEDURE — 3074F SYST BP LT 130 MM HG: CPT | Performed by: FAMILY MEDICINE

## 2023-12-29 PROCEDURE — 3008F BODY MASS INDEX DOCD: CPT | Performed by: FAMILY MEDICINE

## 2023-12-29 PROCEDURE — 99213 OFFICE O/P EST LOW 20 MIN: CPT | Performed by: FAMILY MEDICINE

## 2023-12-29 PROCEDURE — 74018 RADEX ABDOMEN 1 VIEW: CPT | Performed by: RADIOLOGY

## 2023-12-29 PROCEDURE — 1036F TOBACCO NON-USER: CPT | Performed by: FAMILY MEDICINE

## 2023-12-29 PROCEDURE — 73221 MRI JOINT UPR EXTREM W/O DYE: CPT | Mod: RIGHT SIDE | Performed by: RADIOLOGY

## 2023-12-29 PROCEDURE — 3078F DIAST BP <80 MM HG: CPT | Performed by: FAMILY MEDICINE

## 2023-12-29 PROCEDURE — 73221 MRI JOINT UPR EXTREM W/O DYE: CPT | Mod: RT

## 2023-12-29 RX ORDER — OMEPRAZOLE 20 MG/1
20 CAPSULE, DELAYED RELEASE ORAL DAILY
Qty: 30 CAPSULE | Refills: 0 | Status: SHIPPED | OUTPATIENT
Start: 2023-12-29 | End: 2024-01-18 | Stop reason: ALTCHOICE

## 2023-12-29 NOTE — PATIENT INSTRUCTIONS
"Throat Swelling: Occur after significant vomiting, ? GERD, try omeprazole, offered prednisone but pt states he is not interested because \"he does not do well with prednisone\"  Abd pain: r/o constipation, will get abd xray   "

## 2023-12-29 NOTE — PROGRESS NOTES
"  Assessment     ASSESSMENT/PLAN:      Problem List Items Addressed This Visit    None  Visit Diagnoses       Throat pain    -  Primary    Relevant Medications    omeprazole (PriLOSEC) 20 mg DR capsule    Abdominal pain, generalized        Relevant Orders    XR abdomen 1 view            Patient Instructions:  Patient Instructions   Throat Swelling: Occur after significant vomiting, ? GERD, try omeprazole, offered prednisone but pt states he is not interested because \"he does not do well with prednisone\"  Abd pain: r/o constipation, will get abd xray       Signed by: Martha Mcintosh DO       FUTURE DIRECTION:   []    Subjective   SUBJECTIVE:     HPI : Patient is a 28 y.o. male who presents today for the following:     Throat Swelling   Onset 3 weeks ago   Felt like throat was burning initially, followed by significant vomiting, then pain and swelling   Was seen at urgent care and hospital   Was given prednisone and naproxen but did not take   Also was given amoxicillin which he finished   States that throat swelling today in unchanged but throat pain has improved  Denies fever , chest pain or difficulty breath   Mention difficulty with swallowing liquids and solids    Review of Systems    Past Medical History:   Diagnosis Date    Personal history of other specified conditions 12/08/2021    History of fever    Right lower quadrant pain 12/08/2021    Abdominal pain, acute, right lower quadrant        History reviewed. No pertinent surgical history.     Current Outpatient Medications   Medication Instructions    omeprazole (PRILOSEC) 20 mg, oral, Daily, Do not crush or chew.    verapamil SR (CALAN-SR) 240 mg, oral, Nightly        Allergies   Allergen Reactions    Other Other     Seasonal    Sulfa (Sulfonamide Antibiotics) Other        Social History     Socioeconomic History    Marital status: Single     Spouse name: Not on file    Number of children: Not on file    Years of education: Not on file    Highest " education level: Not on file   Occupational History    Not on file   Tobacco Use    Smoking status: Never    Smokeless tobacco: Never   Vaping Use    Vaping Use: Never used   Substance and Sexual Activity    Alcohol use: Yes     Alcohol/week: 1.0 - 2.0 standard drink of alcohol     Types: 1 - 2 Cans of beer per week    Drug use: Never    Sexual activity: Not on file   Other Topics Concern    Not on file   Social History Narrative    Not on file     Social Determinants of Health     Financial Resource Strain: Not on file   Food Insecurity: Not on file   Transportation Needs: Not on file   Physical Activity: Not on file   Stress: Not on file   Social Connections: Not on file   Intimate Partner Violence: Not on file   Housing Stability: Not on file        Family History   Problem Relation Name Age of Onset    Hypertension Mother      Graves' disease Mother      Paranoid behavior Father      Anxiety disorder Father      Migraines Father      Other (hld) Father      Migraines Sister      Thyroid disease Sister      Migraines Brother      Asperger's syndrome Brother      Thyroid disease Maternal Grandfather      Other (bca) Maternal Grandfather      Diabetes type II Maternal Grandfather      Muscular dystrophy Maternal Grandfather      Muscular dystrophy Paternal Grandfather      Melanoma Paternal Grandfather          Objective     OBJECTIVE:     Vitals:    12/29/23 0733   BP: 118/70   Pulse: 63   Temp: 36.3 °C (97.3 °F)   SpO2: 96%   Weight: 105 kg (231 lb)        Physical Exam  Constitutional:       Appearance: Normal appearance.   HENT:      Head: Normocephalic.      Mouth/Throat:      Pharynx: Posterior oropharyngeal erythema present. No oropharyngeal exudate.   Cardiovascular:      Rate and Rhythm: Normal rate.      Heart sounds: No murmur heard.  Pulmonary:      Effort: Pulmonary effort is normal.   Abdominal:      Tenderness: There is abdominal tenderness.   Musculoskeletal:      Cervical back: Normal range of  motion.   Neurological:      Mental Status: He is alert.   Psychiatric:         Mood and Affect: Mood normal.

## 2024-01-03 ENCOUNTER — TELEPHONE (OUTPATIENT)
Dept: PRIMARY CARE | Facility: CLINIC | Age: 29
End: 2024-01-03
Payer: COMMERCIAL

## 2024-01-03 NOTE — TELEPHONE ENCOUNTER
Called pt and informed of results.  Pt states he tried all below and is still having issues, pt transferred to scheduling for a follow up

## 2024-01-03 NOTE — TELEPHONE ENCOUNTER
----- Message from Sonja Milan DO sent at 1/2/2024  8:41 PM EST -----  Please  tell pt that his xray showed likely constipation. Recommend gradual increase in fiber and water in diet. Can also trial miralax if needed. Return If symptoms persistent  ----- Message -----  From: Marnie Oneal  Sent: 1/2/2024  10:31 AM EST  To: Sonja Milan DO    [facility] pt

## 2024-01-08 ENCOUNTER — OFFICE VISIT (OUTPATIENT)
Dept: ORTHOPEDIC SURGERY | Facility: HOSPITAL | Age: 29
End: 2024-01-08
Payer: COMMERCIAL

## 2024-01-08 VITALS — WEIGHT: 220 LBS | BODY MASS INDEX: 33.34 KG/M2 | HEIGHT: 68 IN

## 2024-01-08 DIAGNOSIS — M25.521 CHRONIC ELBOW PAIN, RIGHT: Primary | ICD-10-CM

## 2024-01-08 DIAGNOSIS — M77.11 LATERAL EPICONDYLITIS OF RIGHT ELBOW: ICD-10-CM

## 2024-01-08 DIAGNOSIS — G89.29 CHRONIC ELBOW PAIN, RIGHT: Primary | ICD-10-CM

## 2024-01-08 PROCEDURE — 3008F BODY MASS INDEX DOCD: CPT | Performed by: EMERGENCY MEDICINE

## 2024-01-08 PROCEDURE — 99213 OFFICE O/P EST LOW 20 MIN: CPT | Performed by: EMERGENCY MEDICINE

## 2024-01-08 PROCEDURE — 1036F TOBACCO NON-USER: CPT | Performed by: EMERGENCY MEDICINE

## 2024-01-08 ASSESSMENT — PAIN - FUNCTIONAL ASSESSMENT: PAIN_FUNCTIONAL_ASSESSMENT: 0-10

## 2024-01-08 ASSESSMENT — PAIN DESCRIPTION - DESCRIPTORS: DESCRIPTORS: ACHING

## 2024-01-08 ASSESSMENT — PAIN SCALES - GENERAL: PAINLEVEL_OUTOF10: 5 - MODERATE PAIN
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
## 2024-01-12 ENCOUNTER — APPOINTMENT (OUTPATIENT)
Dept: ORTHOPEDIC SURGERY | Facility: CLINIC | Age: 29
End: 2024-01-12
Payer: COMMERCIAL

## 2024-01-18 ENCOUNTER — OFFICE VISIT (OUTPATIENT)
Dept: PAIN MEDICINE | Facility: HOSPITAL | Age: 29
End: 2024-01-18
Payer: COMMERCIAL

## 2024-01-18 DIAGNOSIS — M79.2 NEUROPATHIC PAIN: Primary | ICD-10-CM

## 2024-01-18 DIAGNOSIS — M77.11 LATERAL EPICONDYLITIS OF RIGHT ELBOW: ICD-10-CM

## 2024-01-18 DIAGNOSIS — M48.07 LUMBOSACRAL STENOSIS WITH NEUROGENIC CLAUDICATION: ICD-10-CM

## 2024-01-18 PROCEDURE — 99214 OFFICE O/P EST MOD 30 MIN: CPT | Performed by: ANESTHESIOLOGY

## 2024-01-18 PROCEDURE — 99204 OFFICE O/P NEW MOD 45 MIN: CPT | Performed by: ANESTHESIOLOGY

## 2024-01-18 PROCEDURE — 1036F TOBACCO NON-USER: CPT | Performed by: ANESTHESIOLOGY

## 2024-01-18 PROCEDURE — 3008F BODY MASS INDEX DOCD: CPT | Performed by: ANESTHESIOLOGY

## 2024-01-18 RX ORDER — TOPIRAMATE 25 MG/1
TABLET ORAL
Qty: 196 TABLET | Refills: 0 | Status: SHIPPED | OUTPATIENT
Start: 2024-01-18 | End: 2024-02-12 | Stop reason: ALTCHOICE

## 2024-01-18 ASSESSMENT — PAIN SCALES - GENERAL: PAINLEVEL: 7
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
## 2024-01-22 ENCOUNTER — OFFICE VISIT (OUTPATIENT)
Dept: OTOLARYNGOLOGY | Facility: CLINIC | Age: 29
End: 2024-01-22
Payer: COMMERCIAL

## 2024-01-22 VITALS — WEIGHT: 220 LBS | BODY MASS INDEX: 33.45 KG/M2

## 2024-01-22 DIAGNOSIS — J34.2 DEVIATED SEPTUM: Primary | ICD-10-CM

## 2024-01-22 DIAGNOSIS — J02.9 SORE THROAT: ICD-10-CM

## 2024-01-22 DIAGNOSIS — J32.9 CHRONIC SINUSITIS, UNSPECIFIED LOCATION: ICD-10-CM

## 2024-01-22 DIAGNOSIS — R09.82 POST-NASAL DRIP: ICD-10-CM

## 2024-01-22 PROCEDURE — 31575 DIAGNOSTIC LARYNGOSCOPY: CPT | Performed by: GENERAL PRACTICE

## 2024-01-22 PROCEDURE — 3008F BODY MASS INDEX DOCD: CPT | Performed by: GENERAL PRACTICE

## 2024-01-22 PROCEDURE — 99204 OFFICE O/P NEW MOD 45 MIN: CPT | Performed by: GENERAL PRACTICE

## 2024-01-22 PROCEDURE — 1036F TOBACCO NON-USER: CPT | Performed by: GENERAL PRACTICE

## 2024-01-22 ASSESSMENT — PATIENT HEALTH QUESTIONNAIRE - PHQ9
1. LITTLE INTEREST OR PLEASURE IN DOING THINGS: NOT AT ALL
SUM OF ALL RESPONSES TO PHQ9 QUESTIONS 1 AND 2: 0
2. FEELING DOWN, DEPRESSED OR HOPELESS: NOT AT ALL

## 2024-01-22 NOTE — PROGRESS NOTES
Otolaryngology - Head and Neck Surgery Outpatient New Patient Visit Note        Assessment/Plan   Problem List Items Addressed This Visit    None  Visit Diagnoses         Codes    Deviated septum    -  Primary J34.2    Chronic sinusitis, unspecified location     J32.9    Relevant Orders    CT sinus wo IV contrast    Sore throat     J02.9    Post-nasal drip     R09.82          Recent lingering sore throat and swollen tonsils with purulent PND eminating from right maxillary region on nasal endoscopy.  Also with leftward NSD limiting view.     Will treat with biaxin, flonase and acquire CT sinus.  Paper scripts written per patient preference due to upcoming travel today.  Discussed decongestants to prevent sinus/ear pressure as possible.             Follow up:  -plan for follow up in clinic as needed and after completion of ordered studies    All of the above findings, impressions, treatment planning and follow up plans were discussed with the patient who indicated understanding.  the patient was instructed to contact or return to clinic sooner if symptoms/signs persist or worsen despite the above management.      Juan Francisco Nicole MD  Otolaryngology - Head and Neck Surgery            History Of Present Illness  Dany Mayorga is a 28 y.o. male presenting for concerns for approx 1-2mo of sore throat and swelling sensation in throat.    Seen in ED and treated with amoxicillin for tonsillitis.  CT neck completed which did not demonstrate PTA at the time.  Images not available but report does not suggest sinusitis.     Reports improvement but not resolution with antibiotics.    Notes ongoing globus and mild sore throat  Notes ongoing congestion and b/l maxillary sinus pressure.     The pt reports longstanding history of chronic maxillary pressure/discomfort and intermittent sinusitis.   Reports a history of allergic rhinitis but no improvement with topical steroids or antihistamines.     Reports previous discussion with ENT  about FESS but not pursued at the time.      Denies significant prior history of recurrent tonsillitis  Denies significant history of GERD            Past Medical History  He has a past medical history of Personal history of other specified conditions (12/08/2021) and Right lower quadrant pain (12/08/2021).    Surgical History  He has no past surgical history on file.     Social History  He reports that he has never smoked. He has never used smokeless tobacco. He reports current alcohol use of about 1.0 - 2.0 standard drink of alcohol per week. He reports that he does not use drugs.    Family History  Family History   Problem Relation Name Age of Onset    Hypertension Mother      Graves' disease Mother      Paranoid behavior Father      Anxiety disorder Father      Migraines Father      Other (hld) Father      Migraines Sister      Thyroid disease Sister      Migraines Brother      Asperger's syndrome Brother      Thyroid disease Maternal Grandfather      Other (bca) Maternal Grandfather      Diabetes type II Maternal Grandfather      Muscular dystrophy Maternal Grandfather      Muscular dystrophy Paternal Grandfather      Melanoma Paternal Grandfather          Allergies  Other and Sulfa (sulfonamide antibiotics)    Review of Systems  ROS: Pertinent positives as noted in HPI.    - CONSTITUTIONAL: Does not report weight loss, fever or chills.    - HEENT:   Ear: Does not report tinnitus, vertigo, hearing loss, otalgia, otorrhea  Nose: Does not report  ,  , epistaxis, decreased smell  Throat: Does not report  , dysphagia, odynophagia  Larynx: Does not report hoarseness,  difficulty breathing, pain with speaking (odynophonia)  Neck: Does not report new masses, pain, swelling  Face: Does not report    ,  , swelling, numbness, weakness     - RESPIRATORY: Does not report SOB or cough.    - CV: Does not report palpitations or chest pain.     - GI: Does not report abdominal pain, nausea, vomiting or diarrhea.    - : Does  not report dysuria or urinary frequency.    - MSK: Does not report myalgia or joint pain.    - SKIN: Does not report rash or pruritus.    - NEUROLOGICAL: Does not report headache or syncope.    - PSYCHIATRIC: Does not report recent changes in mood. Does not report anxiety or depression.         Physical Exam:     GENERAL:   Alert & Oriented to person, place and time; Normal affect and appearance. Well developed and well nourished. Conversant & cooperative with examination.     HEAD:   Normocephalic, atraumatic. No sinus tenderness to palpation. Normal parotid bilaterally. Normal facial strength.     NEUROLOGIC:   Cranial nerves II-XII grossly intact, gait WNL. Normal mood and affect.    EYES:   Extraocular movements intact. Pupils equal, round, reactive to light and accommodation. No nystagmus, no ptosis. no scleral injection. No inflammation at caruncle b/l.    EAR:   Normal auricle. No discomfort or TTP with manipulation.   Handheld otoscopic exam showed normal external auditory canals bilaterally. No purulence or EAC inflammation. Minimal cerumen.   Right tympanic membrane clear and mobile without evidence of perforation, retraction or middle ear effusion.   Left tympanic membrane clear and mobile without evidence of perforation, retraction or middle ear effusion.     NOSE:   No external deformity. No external nasal lesions, lacerations, or scars. Nasal tip symmetrical with normal nasal valves.   Nasal cavity with posterior leftward septum, edematous  mucosa and turbinates. Purulent debris eminating from under right MT/IT and draining down nasopharynx.      OC/OP:   Mucous membranes moist, no masses, lesions or exudates.   Normal tongue, floor of mouth, teeth, gums, lips. Purulent debris on  posterior pharyngeal wall.    Cryptic 2+  tonsils without erythema, exudate or obvious calculi     NECK:   No neck masses or thyroid enlargement. Trachea midline. No tenderness to palpation    LYMPHATIC:   No cervical  lymphadenopathy.     RESPIRATORY:   Symmetric chest elevation & no retractions. No significant hoarseness. No increased work of breathing.    CV:   No clubbing or cyanosis. No obvious edema    Skin:   No facial rashes, vesicles or lesions.     Extremities:   No gross abnormalities      Clinic Procedure    Nasal Endoscopy Laryngoscopy Nasophrayngosocopy   Procedure: flexible fiberoptic laryngoscopy, nasopharyngoscopy.   Flexible Fiberoptic Laryngoscopy Indication:   inability to tolerate mirror exam     Risks, benefits, and alternatives, infection risk, bleeding risk and risk of mucosal trauma and pain were discussed with the patient. Verbal consent was obtained prior to the procedure and is detailed in the patient's record.     Procedure Note:      With the patient seated in the exam chair, the bilateral nasal cavity was topically treated with 4% lidocaine and phenylephrine.  The bilateral nasal cavity was intubated with the flexible laryngoscope.   Nasal Endoscopy: Nasal endoscopy was successfully completed using the endoscope and the nasal mucosa, septum, turbinates, and osteomeatal complex were examined.  Nasopharyngoscopy: Nasopharyngoscopy was successfully completed using the endoscope and the nasal mucosa, septum, turbinates, osteomeatal complex, and nasopharynx were examined.   Laryngoscopy: Laryngoscopy was successfully completed using the flexible laryngoscope and the nasopharynx, hypopharynx, and larynx were examined.  Patient Status: the patient tolerated the procedure well.   Complications: there were no complications.      . After obtaining adequate decongestion and anesthesia, the scope was introduced into the floor of the nasal cavity. The septum, inferior, and middle turbinates were without any mucosal lesions.  Purulence draining from right maxillary region as above.  The Fossa of [place] were clear bilaterally, and good velopharyngeal closure was obtained on phonation.  Base of tongue,  tonsillar complex, and posterior pharyngeal wall free of asymmetry or concerning ulcerations or masses.  Supraglottic segments without significant evidence of inflammation.  No mucosal ulcerations or masses.  True vocal cords abduct and adduct normally with no mucosal or mass lesions.  No masses visualized in the pyriform recesses.  The scope was removed and patient tolerated the procedure well.      Information review:  External sources (notes, imaging, lab results) listed below personally reviewed to aid in medical decision making process.  -PCM note 12/29/23  -ED note 12/21/23  -CT neck report 12/21/23

## 2024-01-26 ENCOUNTER — APPOINTMENT (OUTPATIENT)
Dept: PRIMARY CARE | Facility: CLINIC | Age: 29
End: 2024-01-26
Payer: COMMERCIAL

## 2024-01-29 ENCOUNTER — APPOINTMENT (OUTPATIENT)
Dept: DERMATOLOGY | Facility: CLINIC | Age: 29
End: 2024-01-29
Payer: COMMERCIAL

## 2024-02-09 ENCOUNTER — APPOINTMENT (OUTPATIENT)
Dept: PRIMARY CARE | Facility: CLINIC | Age: 29
End: 2024-02-09
Payer: COMMERCIAL

## 2024-02-12 ENCOUNTER — HOSPITAL ENCOUNTER (OUTPATIENT)
Dept: RADIOLOGY | Facility: HOSPITAL | Age: 29
Discharge: HOME | End: 2024-02-12
Payer: COMMERCIAL

## 2024-02-12 ENCOUNTER — OFFICE VISIT (OUTPATIENT)
Dept: PRIMARY CARE | Facility: CLINIC | Age: 29
End: 2024-02-12
Payer: COMMERCIAL

## 2024-02-12 VITALS
HEIGHT: 68 IN | HEART RATE: 73 BPM | DIASTOLIC BLOOD PRESSURE: 80 MMHG | WEIGHT: 227 LBS | SYSTOLIC BLOOD PRESSURE: 124 MMHG | BODY MASS INDEX: 34.4 KG/M2

## 2024-02-12 DIAGNOSIS — G43.009 MIGRAINE WITHOUT AURA, NOT REFRACTORY: ICD-10-CM

## 2024-02-12 DIAGNOSIS — I10 ESSENTIAL HYPERTENSION: ICD-10-CM

## 2024-02-12 DIAGNOSIS — J32.9 CHRONIC SINUSITIS, UNSPECIFIED LOCATION: ICD-10-CM

## 2024-02-12 DIAGNOSIS — F32.A MILD DEPRESSION: ICD-10-CM

## 2024-02-12 DIAGNOSIS — F41.1 GENERALIZED ANXIETY DISORDER: ICD-10-CM

## 2024-02-12 DIAGNOSIS — K58.1 IRRITABLE BOWEL SYNDROME WITH CONSTIPATION: ICD-10-CM

## 2024-02-12 DIAGNOSIS — G56.21 CUBITAL TUNNEL SYNDROME ON RIGHT: Primary | ICD-10-CM

## 2024-02-12 DIAGNOSIS — M77.11 LATERAL EPICONDYLITIS OF RIGHT ELBOW: ICD-10-CM

## 2024-02-12 PROCEDURE — 99204 OFFICE O/P NEW MOD 45 MIN: CPT | Performed by: FAMILY MEDICINE

## 2024-02-12 PROCEDURE — 70486 CT MAXILLOFACIAL W/O DYE: CPT

## 2024-02-12 PROCEDURE — 3008F BODY MASS INDEX DOCD: CPT | Performed by: FAMILY MEDICINE

## 2024-02-12 PROCEDURE — 3079F DIAST BP 80-89 MM HG: CPT | Performed by: FAMILY MEDICINE

## 2024-02-12 PROCEDURE — 3074F SYST BP LT 130 MM HG: CPT | Performed by: FAMILY MEDICINE

## 2024-02-12 PROCEDURE — 1036F TOBACCO NON-USER: CPT | Performed by: FAMILY MEDICINE

## 2024-02-12 ASSESSMENT — PATIENT HEALTH QUESTIONNAIRE - PHQ9
2. FEELING DOWN, DEPRESSED OR HOPELESS: SEVERAL DAYS
1. LITTLE INTEREST OR PLEASURE IN DOING THINGS: NOT AT ALL
SUM OF ALL RESPONSES TO PHQ9 QUESTIONS 1 AND 2: 1

## 2024-02-12 ASSESSMENT — ENCOUNTER SYMPTOMS
DEPRESSION: 0
LOSS OF SENSATION IN FEET: 0
OCCASIONAL FEELINGS OF UNSTEADINESS: 0

## 2024-02-12 NOTE — RESULT ENCOUNTER NOTE
CT sinus shows chronic sinusitis and deviated septum.  Can discuss further treatment planning at follow up.

## 2024-02-12 NOTE — PROGRESS NOTES
"Subjective   Patient ID: Dany Mayorga is a 28 y.o. male who presents for New Patient Visit.    Last Physical : _1___ Years ago     Pt's PMH, PSH, SH, FH , meds and allergies was obtained / reviewed and updated .     Dental  Visits : Y  Vision issues : N  Hearing issues : N    Immunizations : Y    Diet :  Could be better   Exercise:  Not regularly  Weight concerns : yes    Alcohol: as noted in   Tobacco: as noted in   Recreational drugs :  None /as noted in        Metabolic screening   - Lipids   - Glucose     ==================================    Visit Vitals  Blood Pressure 124/80   Pulse 73   Height 1.727 m (5' 8\")   Weight 103 kg (227 lb)   Body Mass Index 34.52 kg/m²   Smoking Status Never   Body Surface Area 2.22 m²      =====================  Review of Systems:    Constitutional: no chills, no fever      Eyes: no blurred vision and no eyesight problems.     ENT: no hearing loss, no sore throat.     Cardiovascular: no chest pain, no intermittent leg claudication, no lower extremity edema, no palpitations and no syncope.     Respiratory: no cough, no shortness of breath during exertion, no shortness of breath at rest and no wheezing.     Gastrointestinal: Has chronic constipation, n, no melena, no nausea, no rectal pain and no vomiting.     Genitourinary: no dysuria, no change in urinary frequency, no urinary hesitancy and no feelings of urinary urgency.     Musculoskeletal: No right upper extremity pain    Integumentary: no new skin lesions and no rashes.     Neurological: no difficulty walking, no headache, no limb weakness, no numbness and no tingling.     Psychiatric: Anxiety and depression no anhedonia and no substance use disorders.        Hematologic/Lymphatic: no tendency for easy bruising and no swollen glands.          All other systems have been reviewed and are negative for complaint.    =====================================================    Physical exam :    Constitutional: Alert and " in no acute distress. Well developed, well nourished.     Ears, Nose, Mouth, and Throat: External inspection of ears and nose: Normal.     Neck: No neck mass was observed. Supple.     Cardiovascular: Heart rate and rhythm were normal, normal S1 and S2, no gallops, no murmurs and no pericardial rub    Pulmonary: No respiratory distress. Clear bilateral breath sounds.     Abdomen: Soft nontender;     Musculoskeletal: No joint swelling seen, Muscle strength/tone: Normal.      Skin: Normal skin color and pigmentation, normal skin turgor, and no rash.     Neurologic: Deep tendon reflexes were 2+ and symmetric. Sensation: Normal.   Lymphatic : Cervical/ axillary/ groin Lns Palpable/ non palpable       Assessment/Plan    Problem List Items Addressed This Visit             ICD-10-CM    Essential hypertension I10     Continue verapamil blood pressure is well-controlled         Generalized anxiety disorder F41.1     Has been referred to psychiatry provided additional numbers besides  due to scheduling delay         Migraine without aura, not refractory G43.009     Continue verapamil blood pressure is well-controlled         Mild depression F32.A    Lateral epicondylitis of right elbow M77.11     Seeing orthopedics in [place] referred o  orthopedics for second opinion         Cubital tunnel syndrome on right - Primary G56.21    Relevant Orders    Referral to Orthopaedic Surgery    Irritable bowel syndrome with constipation K58.1     Patient was advised to start IBgard continue fiber water         Relevant Orders    Referral to Psychiatry

## 2024-02-18 PROBLEM — J32.9 CHRONIC SINUSITIS: Status: ACTIVE | Noted: 2024-02-18

## 2024-03-05 ENCOUNTER — OFFICE VISIT (OUTPATIENT)
Dept: OTOLARYNGOLOGY | Facility: CLINIC | Age: 29
End: 2024-03-05
Payer: COMMERCIAL

## 2024-03-05 VITALS — BODY MASS INDEX: 34.52 KG/M2 | WEIGHT: 227 LBS

## 2024-03-05 DIAGNOSIS — J32.2 CHRONIC ETHMOIDAL SINUSITIS: ICD-10-CM

## 2024-03-05 DIAGNOSIS — J32.0 CHRONIC MAXILLARY SINUSITIS: ICD-10-CM

## 2024-03-05 DIAGNOSIS — R09.81 NASAL CONGESTION: ICD-10-CM

## 2024-03-05 DIAGNOSIS — J34.2 DEVIATED SEPTUM: ICD-10-CM

## 2024-03-05 DIAGNOSIS — J34.3 NASAL TURBINATE HYPERTROPHY: ICD-10-CM

## 2024-03-05 DIAGNOSIS — J32.9 CHRONIC SINUSITIS, UNSPECIFIED LOCATION: ICD-10-CM

## 2024-03-05 PROCEDURE — 3008F BODY MASS INDEX DOCD: CPT | Performed by: GENERAL PRACTICE

## 2024-03-05 PROCEDURE — 1036F TOBACCO NON-USER: CPT | Performed by: GENERAL PRACTICE

## 2024-03-05 PROCEDURE — 99214 OFFICE O/P EST MOD 30 MIN: CPT | Performed by: GENERAL PRACTICE

## 2024-03-05 RX ORDER — FLUTICASONE PROPIONATE 50 MCG
2 SPRAY, SUSPENSION (ML) NASAL DAILY
Qty: 16 G | Refills: 2 | Status: SHIPPED | OUTPATIENT
Start: 2024-03-05 | End: 2024-05-07

## 2024-03-05 NOTE — LETTER
March 5, 2024     Patient: Dany Mayorga   YOB: 1995   Date of Visit: 3/5/2024       To Whom It May Concern:    Dany Mayorga was seen in my clinic on 3/5/2024 at 11:00 am. Please excuse Dany for his absence from work on this day to make the appointment.    If you have any questions or concerns, please don't hesitate to call.         Sincerely,         Juan Francisco Nicole MD        CC: No Recipients

## 2024-03-05 NOTE — PROGRESS NOTES
Otolaryngology - Head and Neck Surgery Outpatient New Patient Visit Note        Assessment/Plan   Problem List Items Addressed This Visit             ICD-10-CM       ENT    Chronic sinusitis J32.9    Relevant Medications    fluticasone (Flonase) 50 mcg/actuation nasal spray    Other Relevant Orders    Case Request Operating Room: Repair Septum Nasal Cavity, Excision Nasal Turbinate, Sinusotomy, Nasal Endoscopy with Excision Tissue Ethmoid Sinus, Reduction Turbinate (Completed)    CBC    Type And Screen    Request for Pre-Admission Testing Visit    Case Request Operating Room: Repair Septum Nasal Cavity, Excision Nasal Turbinate, Sinusotomy, Nasal Endoscopy with Excision Tissue Ethmoid Sinus, Reduction Turbinate (Completed)    CBC    Type And Screen    Request for Pre-Admission Testing Visit    CBC    Type And Screen    Request for Pre-Admission Testing Visit    Deviated septum J34.2    Relevant Orders    Case Request Operating Room: Repair Septum Nasal Cavity, Excision Nasal Turbinate, Sinusotomy, Nasal Endoscopy with Excision Tissue Ethmoid Sinus, Reduction Turbinate (Completed)    CBC    Type And Screen    Request for Pre-Admission Testing Visit    Nasal turbinate hypertrophy J34.3    Relevant Orders    Case Request Operating Room: Repair Septum Nasal Cavity, Excision Nasal Turbinate, Sinusotomy, Nasal Endoscopy with Excision Tissue Ethmoid Sinus, Reduction Turbinate (Completed)    CBC    Type And Screen    Request for Pre-Admission Testing Visit    Nasal congestion R09.81    Relevant Orders    Case Request Operating Room: Repair Septum Nasal Cavity, Excision Nasal Turbinate, Sinusotomy, Nasal Endoscopy with Excision Tissue Ethmoid Sinus, Reduction Turbinate (Completed)    CBC    Type And Screen    Request for Pre-Admission Testing Visit       28yoM with chronic sinusitis, 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    Discussed consideration of  surgical intervention and pt would like to proceed.  Discussed septoturbinoplasty, andres bullosa takedown, b/l MMA and L Anterior ethmoidectomy.    Risks, benefits and indications of the procedure were discussed.  This included discussion of risks of pain, bleeding, infection, scarring with poor functional or cosmetic result, need for future procedures, or damage to surrounding structures to include regional blood vessels and nerves.  In particular, the risk of ongoing congestion/obstruction, ongoing sinusitis, CSF leak, vision change/loss was highlighted.  All questions were answered.  The patient/caregiver indicated understanding of the discussion and elected to proceed with the procedure.  Will schedule as available.       Follow up:  -plan for follow up in clinic as needed    All of the above findings, impressions, treatment planning and follow up plans were discussed with the patient who indicated understanding.  the patient was instructed to contact or return to clinic sooner if symptoms/signs persist or worsen despite the above management.      Juan Francisco Nicole MD  Otolaryngology - Head and Neck Surgery            History Of Present Illness  Dany Mayorga is a 28 y.o. male presenting for follow up of treated CT sinus.  Mild improvement while on medications but return to baseline congetion, sinus pressure and nonpurulent rhinorrhea.     Recall    Dany Mayorga is a 28 y.o. male presenting for concerns for approx 1-2mo of sore throat and swelling sensation in throat.    Seen in ED and treated with amoxicillin for tonsillitis.  CT neck completed which did not demonstrate PTA at the time.  Images not available but report does not suggest sinusitis.      Reports improvement but not resolution with antibiotics.    Notes ongoing globus and mild sore throat  Notes ongoing congestion and b/l maxillary sinus pressure.      The pt reports longstanding history of chronic maxillary pressure/discomfort and intermittent  sinusitis.   Reports a history of allergic rhinitis but no improvement with topical steroids or antihistamines.      Reports previous discussion with ENT about FESS but not pursued at the time.       Denies significant prior history of recurrent tonsillitis  Denies significant history of GERD                 Past Medical History  He has a past medical history of Cervical disc disorder (10/29/2020), Chronic pain disorder (10/30/2020), Depression (2009), Extremity pain (10/29/2020), Migraine (2014), Neck pain (10/29/2020), Personal history of other specified conditions (12/08/2021), and Right lower quadrant pain (12/08/2021).    Surgical History  He has a past surgical history that includes Epidural block injection (July 2023).     Social History  He reports that he has never smoked. He has never used smokeless tobacco. He reports current alcohol use of about 1.0 - 2.0 standard drink of alcohol per week. He reports that he does not use drugs.    Family History  Family History   Problem Relation Name Age of Onset    Hypertension Mother Nora Mayorga     Graves' disease Mother Nora Mayorga     Depression Mother Nora Mayorga     Paranoid behavior Father Power Mukesh     Anxiety disorder Father Power Mukesh     Migraines Father Power Mukesh     Other (hld) Father Power Mayorga     Hyperlipidemia Father Power Mukesh     Hypertension Father Power Mukesh     Mental illness Father Power Salmeronrafiq     Migraines Sister Sonia     Thyroid disease Sister Sonia     Depression Sister Sonia     Migraines Brother      Asperger's syndrome Brother      Thyroid disease Maternal Grandfather Jeremiah Melissa     Other (bca) Maternal Grandfather Jeremiah Melissa     Diabetes type II Maternal Grandfather Jeremiah Melissa     Muscular dystrophy Maternal Grandfather Jeremiah Melissa     Cancer Maternal Grandfather Jeremiah Melissa     Diabetes Maternal Grandfather Jeremiah Melissa     Muscular dystrophy Paternal Grandfather Cal Mayorga     Melanoma  Paternal Grandfather Cal Mayorga     Early natural death Paternal Grandfather Cal Mayorga     GI problems Paternal Grandfather Cal Mayorga     Arthritis Maternal Grandmother Hellen Melissa     Cancer Maternal Grandmother Hellen Melissa     Depression Maternal Grandmother Hellen Melissa     Diabetes Maternal Grandmother Hellen Melissa     Depression Brother Shelton     Hypertension Brother Shelton     Mental illness Brother Shelton     Migraines Brother Shelton     GI problems Father's Brother Allen Mayorga         Allergies  Other and Sulfa (sulfonamide antibiotics)    Review of Systems  ROS: Pertinent positives as noted in HPI.    - CONSTITUTIONAL: Does not report weight loss, fever or chills.    - HEENT:   Ear: Does not report tinnitus, vertigo, hearing loss, otalgia, otorrhea  Nose: Does not report  ,  , epistaxis, decreased smell  Throat: Does not report pain, dysphagia, odynophagia  Larynx: Does not report hoarseness,  difficulty breathing, pain with speaking (odynophonia)  Neck: Does not report new masses, pain, swelling  Face: Does not report    ,  , swelling, numbness, weakness     - RESPIRATORY: Does not report SOB or cough.    - CV: Does not report palpitations or chest pain.     - GI: Does not report abdominal pain, nausea, vomiting or diarrhea.    - : Does not report dysuria or urinary frequency.    - MSK: Does not report myalgia or joint pain.    - SKIN: Does not report rash or pruritus.    - NEUROLOGICAL: Does not report headache or syncope.    - PSYCHIATRIC: Does not report recent changes in mood. Does not report anxiety or depression.         Physical Exam:     GENERAL:   Alert & Oriented to person, place and time; Normal affect and appearance. Well developed and well nourished. Conversant & cooperative with examination.     HEAD:   Normocephalic, atraumatic. No sinus tenderness to palpation. Normal parotid bilaterally. Normal facial strength.     NEUROLOGIC:   Cranial nerves II-XII grossly intact, gait WNL. Normal  mood and affect.    EYES:   Extraocular movements intact. Pupils equal, round, reactive to light and accommodation. No nystagmus, no ptosis. no scleral injection.    EAR:   Normal auricle. No discomfort or TTP with manipulation.   Handheld otoscopic exam showed normal external auditory canals bilaterally. No purulence or EAC inflammation. Minimal cerumen.   Right tympanic membrane clear and mobile without evidence of perforation, retraction or middle ear effusion.   Left tympanic membrane clear and mobile without evidence of perforation, retraction or middle ear effusion.       No external deformity. No external nasal lesions, lacerations, or scars. Nasal tip symmetrical with normal nasal valves.   Nasal cavity with posterior leftward septum, edematous  mucosa and turbinates. Purulent debris eminating from under right MT/IT and draining down nasopharynx.       OC/OP:   Mucous membranes moist, no masses, lesions or exudates.   Normal tongue, floor of mouth, teeth, gums, lips. Purulent debris on  posterior pharyngeal wall.    Cryptic 2+  tonsils without erythema, exudate or obvious calculi     NECK:   No neck masses or thyroid enlargement. Trachea midline. No tenderness to palpation    LYMPHATIC:   No cervical lymphadenopathy.     RESPIRATORY:   Symmetric chest elevation & no retractions. No significant hoarseness. No increased work of breathing.    CV:   No clubbing or cyanosis. No obvious edema    Skin:   No facial rashes, vesicles or lesions.     Extremities:   No gross abnormalities      Clinic Procedure        Information review:  External sources (notes, imaging, lab results) listed below personally reviewed to aid in medical decision making process.  -  CT sinus   -2/12/24  with L>R maxillary and ethmoid mucosal thickenign and polypoid debris.  R>L andres bullosa and leftward deviated septum.   -

## 2024-03-06 PROBLEM — J34.2 DEVIATED SEPTUM: Status: ACTIVE | Noted: 2024-03-05

## 2024-03-06 PROBLEM — J34.3 NASAL TURBINATE HYPERTROPHY: Status: ACTIVE | Noted: 2024-03-05

## 2024-03-06 PROBLEM — R09.81 NASAL CONGESTION: Status: ACTIVE | Noted: 2024-03-05

## 2024-03-08 ENCOUNTER — APPOINTMENT (OUTPATIENT)
Dept: PULMONOLOGY | Facility: CLINIC | Age: 29
End: 2024-03-08
Payer: COMMERCIAL

## 2024-03-11 ENCOUNTER — APPOINTMENT (OUTPATIENT)
Dept: OTOLARYNGOLOGY | Facility: CLINIC | Age: 29
End: 2024-03-11
Payer: COMMERCIAL

## 2024-04-02 ENCOUNTER — PRE-ADMISSION TESTING (OUTPATIENT)
Dept: PREADMISSION TESTING | Facility: HOSPITAL | Age: 29
End: 2024-04-02
Payer: COMMERCIAL

## 2024-04-02 VITALS
TEMPERATURE: 97.3 F | WEIGHT: 213.85 LBS | RESPIRATION RATE: 18 BRPM | DIASTOLIC BLOOD PRESSURE: 76 MMHG | HEIGHT: 68 IN | BODY MASS INDEX: 32.41 KG/M2 | OXYGEN SATURATION: 96 % | HEART RATE: 76 BPM | SYSTOLIC BLOOD PRESSURE: 117 MMHG

## 2024-04-02 DIAGNOSIS — Z01.818 PRE-OPERATIVE EXAMINATION: Primary | ICD-10-CM

## 2024-04-02 DIAGNOSIS — R09.81 NASAL CONGESTION: ICD-10-CM

## 2024-04-02 DIAGNOSIS — J34.2 DEVIATED SEPTUM: ICD-10-CM

## 2024-04-02 DIAGNOSIS — J32.2 CHRONIC ETHMOIDAL SINUSITIS: ICD-10-CM

## 2024-04-02 DIAGNOSIS — J34.3 NASAL TURBINATE HYPERTROPHY: ICD-10-CM

## 2024-04-02 DIAGNOSIS — J32.9 CHRONIC SINUSITIS, UNSPECIFIED LOCATION: ICD-10-CM

## 2024-04-02 DIAGNOSIS — J32.0 CHRONIC MAXILLARY SINUSITIS: ICD-10-CM

## 2024-04-02 LAB
ABO GROUP (TYPE) IN BLOOD: NORMAL
ANION GAP SERPL CALC-SCNC: 17 MMOL/L (ref 10–20)
ANTIBODY SCREEN: NORMAL
BUN SERPL-MCNC: 17 MG/DL (ref 6–23)
CALCIUM SERPL-MCNC: 9.1 MG/DL (ref 8.6–10.3)
CHLORIDE SERPL-SCNC: 104 MMOL/L (ref 98–107)
CO2 SERPL-SCNC: 29 MMOL/L (ref 21–32)
CREAT SERPL-MCNC: 0.9 MG/DL (ref 0.5–1.3)
EGFRCR SERPLBLD CKD-EPI 2021: >90 ML/MIN/1.73M*2
ERYTHROCYTE [DISTWIDTH] IN BLOOD BY AUTOMATED COUNT: 11.9 % (ref 11.5–14.5)
GLUCOSE SERPL-MCNC: 81 MG/DL (ref 74–99)
HCT VFR BLD AUTO: 50.4 % (ref 41–52)
HGB BLD-MCNC: 17.3 G/DL (ref 13.5–17.5)
MCH RBC QN AUTO: 31.5 PG (ref 26–34)
MCHC RBC AUTO-ENTMCNC: 34.3 G/DL (ref 32–36)
MCV RBC AUTO: 92 FL (ref 80–100)
NRBC BLD-RTO: 0 /100 WBCS (ref 0–0)
PLATELET # BLD AUTO: 229 X10*3/UL (ref 150–450)
POTASSIUM SERPL-SCNC: 4.7 MMOL/L (ref 3.5–5.3)
RBC # BLD AUTO: 5.5 X10*6/UL (ref 4.5–5.9)
RH FACTOR (ANTIGEN D): NORMAL
SODIUM SERPL-SCNC: 145 MMOL/L (ref 136–145)
WBC # BLD AUTO: 8.4 X10*3/UL (ref 4.4–11.3)

## 2024-04-02 PROCEDURE — 99203 OFFICE O/P NEW LOW 30 MIN: CPT | Performed by: REGISTERED NURSE

## 2024-04-02 PROCEDURE — 85027 COMPLETE CBC AUTOMATED: CPT

## 2024-04-02 PROCEDURE — 82374 ASSAY BLOOD CARBON DIOXIDE: CPT

## 2024-04-02 PROCEDURE — 36415 COLL VENOUS BLD VENIPUNCTURE: CPT

## 2024-04-02 PROCEDURE — 86901 BLOOD TYPING SEROLOGIC RH(D): CPT

## 2024-04-02 RX ORDER — LAMOTRIGINE 25 MG/1
25 TABLET ORAL NIGHTLY
COMMUNITY

## 2024-04-02 ASSESSMENT — DUKE ACTIVITY SCORE INDEX (DASI)
CAN YOU WALK INDOORS, SUCH AS AROUND YOUR HOUSE: YES
CAN YOU DO MODERATE WORK AROUND THE HOUSE LIKE VACUUMING, SWEEPING FLOORS OR CARRYING GROCERIES: YES
CAN YOU CLIMB A FLIGHT OF STAIRS OR WALK UP A HILL: YES
CAN YOU WALK A BLOCK OR TWO ON LEVEL GROUND: YES
CAN YOU PARTICIPATE IN MODERATE RECREATIONAL ACTIVITIES LIKE GOLF, BOWLING, DANCING, DOUBLES TENNIS OR THROWING A BASEBALL OR FOOTBALL: NO
CAN YOU PARTICIPATE IN STRENOUS SPORTS LIKE SWIMMING, SINGLES TENNIS, FOOTBALL, BASKETBALL, OR SKIING: NO
CAN YOU RUN A SHORT DISTANCE: YES
CAN YOU TAKE CARE OF YOURSELF (EAT, DRESS, BATHE, OR USE TOILET): YES
CAN YOU DO HEAVY WORK AROUND THE HOUSE LIKE SCRUBBING FLOORS OR LIFTING AND MOVING HEAVY FURNITURE: YES
CAN YOU HAVE SEXUAL RELATIONS: YES

## 2024-04-02 ASSESSMENT — ENCOUNTER SYMPTOMS
GASTROINTESTINAL NEGATIVE: 1
NEUROLOGICAL NEGATIVE: 1
CARDIOVASCULAR NEGATIVE: 1
CONSTITUTIONAL NEGATIVE: 1
NECK STIFFNESS: 1
RESPIRATORY NEGATIVE: 1

## 2024-04-02 ASSESSMENT — CHADS2 SCORE
PRIOR STROKE OR TIA OR THROMBOEMBOLISM: NO
DIABETES: NO
CHADS2 SCORE: 1
CHF: NO
AGE GREATER THAN OR EQUAL TO 75: NO
HYPERTENSION: YES

## 2024-04-02 ASSESSMENT — LIFESTYLE VARIABLES: SMOKING_STATUS: NONSMOKER

## 2024-04-02 NOTE — PREPROCEDURE INSTRUCTIONS
Medication List            Accurate as of April 2, 2024  2:33 PM. Always use your most recent med list.                fluticasone 50 mcg/actuation nasal spray  Commonly known as: Flonase  Administer 2 sprays into each nostril once daily. Shake gently. Before first use, prime pump. After use, clean tip and replace cap.  Medication Adjustments for Surgery: Take morning of surgery with sip of water, no other fluids     lamoTRIgine 25 mg tablet  Commonly known as: LaMICtal  Medication Adjustments for Surgery: Take morning of surgery with sip of water, no other fluids     verapamil  mg ER tablet  Commonly known as: Calan-SR  Take 1 tablet (240 mg) by mouth once daily at bedtime.  Notes to patient: Do not take night before surgery            SURGERY PRE-OPERATIVE INSTRUCTIONS    *You will receive a phone call the day before your procedure  after 2pm, (or the Friday before your surgery if scheduled on a Monday.) Generally the hospital will be calling you with this information after that time.    *You are not to eat after midnight the night before the surgery. You may have 8oz of a clear liquid up until 2 hours prior to arriving to the hospital. The exception is with medications you were instructed to take day of surgery.    *You may take tylenol for pain/discomfort as needed.     *Stop taking all aspirin products, ibuprofen (motrin/advil), naproxen (aleve/naprosyn) for one week prior to surgery.    *Stop taking all vitamins and supplements one week prior to surgery.     *You should not have alcoholic beverages for 24 hours before surgery.     *You should not smoke 24 hours prior to surgery.     *To help prevent surgical infections bathe/shower with Dial soap the evening before surgery.    *You can wear deodorant but no lotion, powder, or perfume/cologne. You should remove all make-up and nail polish at home.    *If you wear glasses, please bring a case for the glasses with you.    *You will be asked to remove  dentures and contacts.     *Please leave all valuables at home.    *You should wear loose, comfortable clothing that will accommodate bandages and/or casts.    *You should notify your doctor of any change in your condition (fever, cold, rash, etc). Surgery may need to be re-scheduled until a time you are in better health.    *A responsible adult is required to accompany you to and from the hospital if you are receiving anesthesia or a sedative. Patients are not permitted to drive for 24 hours after anesthesia.     *You can use the [employer] if you wish.     *If you have any further questions please call [facility] [phone].                   NPO Instructions:    Do not eat any food after midnight the night before your surgery/procedure.    Additional Instructions:     Review your medication instructions, stop indicated medications

## 2024-04-02 NOTE — CPM/PAT H&P
CPM/PAT Evaluation       Name: Dany Mayorga (Dany Mayorga)  /Age: 1995/ y.o.     In-Person       Chief Complaint: Evaluation prior to surgery    HPI  28 year old male scheduled for Repair Septum Nasal Cavity, Excision Nasal Turbinate, Sinusotomy, Nasal endoscopy with excision tissue ethmoid sinus, reduction turbinate  - Bilateral on 24 with Dr. Nicole secondary to Deviated septum, Nasal turbinate hypertrophy,  Nasal congestion, Chronic maxillary sinusitis, Chronic ethmoidal sinusitis. PMHx includes HTN, Anxiety, Depression, Right cervical radiculopathy. Presents to [facility] for preoperative risk stratification and optimization.   Past Medical History:   Diagnosis Date    Cervical disc disorder 10/29/2020    Chronic pain disorder 10/30/2020    Depression 2009    Extremity pain 10/29/2020    Migraine 2014    Neck pain 10/29/2020    Personal history of other specified conditions 2021    History of fever    Right lower quadrant pain 2021    Abdominal pain, acute, right lower quadrant       Past Surgical History:   Procedure Laterality Date    COLONOSCOPY      EPIDURAL BLOCK INJECTION  2023    WISDOM TOOTH EXTRACTION         Patient  reports that he is not currently sexually active and has had partner(s) who are female. He reports using the following method of birth control/protection: Condom Male.    Family History   Problem Relation Name Age of Onset    Hypertension Mother Adalgisajesse Jaronrafiq     Graves' disease Mother Nora Salmeronrafiq     Depression Mother Nora Salmeronrafiq     Paranoid behavior Father Power Mukesh     Anxiety disorder Father Power Mukesh     Migraines Father Power Mukesh     Other (hld) Father Power Mayorga     Hyperlipidemia Father Power Mukesh     Hypertension Father Power Mayorga     Mental illness Father Power Mayorga     Migraines Sister Sonia     Thyroid disease Sister Sonia     Depression Sister Sonia     Migraines Brother      Asperger's  syndrome Brother      Thyroid disease Maternal Grandfather Jeremiah Melissa     Other (bca) Maternal Grandfather Jeremiah Melissa     Diabetes type II Maternal Grandfather Jeremiah Melissa     Muscular dystrophy Maternal Grandfather Jeremiah Melissa     Cancer Maternal Grandfather Jeremiah Melissa     Diabetes Maternal Grandfather Jeremiah Melissa     Muscular dystrophy Paternal Grandfather Doc Fellhauer     Melanoma Paternal Grandfather Doc Fellhauer     Early natural death Paternal Grandfather Doc Fellhauer     GI problems Paternal Grandfather Doc Fellhauer     Arthritis Maternal Grandmother Hellen Melissa     Cancer Maternal Grandmother Hellen Melissa     Depression Maternal Grandmother Hellen Melissa     Diabetes Maternal Grandmother Hellen Melissa     Depression Brother Shelton     Hypertension Brother Shelton     Mental illness Brother Shelton     Migraines Brother Shelton     GI problems Father's Brother Allen Mayorga        Allergies   Allergen Reactions    Other Other     Seasonal    Sulfa (Sulfonamide Antibiotics) Other       Prior to Admission medications    Medication Sig Start Date End Date Taking? Authorizing Provider   fluticasone (Flonase) 50 mcg/actuation nasal spray Administer 2 sprays into each nostril once daily. Shake gently. Before first use, prime pump. After use, clean tip and replace cap. 3/5/24 4/4/24  Juan Francisco Nicole MD   verapamil SR (Calan-SR) 240 mg ER tablet Take 1 tablet (240 mg) by mouth once daily at bedtime. 11/7/23 5/5/24  DO SALMA Brush ROS:   Constitutional:   neg    Neuro/Psych:   neg    Eyes:    use of corrective lenses  Ears:   neg    Nose:   Mouth:   neg    Throat:   neg    Neck:    Stiffness and limitations with ROM   neck stiffness  Cardio:   neg    Respiratory:   neg    Endocrine:   GI:   neg    :   neg    Musculoskeletal:    C4-C5 herniated disc, right arm pain  Hematologic:   neg    Skin:      Physical Exam  Vitals reviewed.   Constitutional:       Appearance: Normal appearance.   HENT:      Head: Normocephalic  and atraumatic.      Comments: beard     Nose: Nose normal.      Mouth/Throat:      Mouth: Mucous membranes are moist.      Pharynx: Oropharynx is clear.   Neck:      Vascular: No carotid bruit.      Comments: Stiffness and limitations with ROM  Cardiovascular:      Rate and Rhythm: Normal rate and regular rhythm.      Pulses: Normal pulses.      Heart sounds: Normal heart sounds.   Pulmonary:      Effort: Pulmonary effort is normal.      Breath sounds: Normal breath sounds.   Abdominal:      Palpations: Abdomen is soft.   Musculoskeletal:         General: Normal range of motion.      Cervical back: Neck supple.      Comments: Right arm pain   Skin:     General: Skin is warm and dry.      Capillary Refill: Capillary refill takes less than 2 seconds.   Neurological:      General: No focal deficit present.      Mental Status: He is alert and oriented to person, place, and time.   Psychiatric:         Mood and Affect: Mood normal.         Behavior: Behavior normal.         Thought Content: Thought content normal.         Judgment: Judgment normal.          PAT AIRWAY:   Airway:     Mallampati::  II    TM distance::  >3 FB    Neck ROM::  Limited  normal        Visit Vitals  /76   Pulse 76   Temp 36.3 °C (97.3 °F) (Temporal)   Resp 18       DASI Risk Score    No data to display       Caprini DVT Assessment      Flowsheet Row Most Recent Value   DVT Score 5   Current Status Major surgery planned, including arthroscopic and laproscopic (1-2 hours)   Age Less than 40 years   BMI 31-40 (Obesity)          Modified Frailty Index    No data to display       CHADS2 Stroke Risk  Current as of 28 minutes ago        N/A 3 - 100%: High Risk   2 - 3%: Medium Risk   0 - 2%: Low Risk     Last Change: N/A          This score determines the patient's risk of having a stroke if the patient has atrial fibrillation.        This score is not applicable to this patient. Components are not calculated.          Revised Cardiac Risk Index       Flowsheet Row Most Recent Value   Revised Cardiac Risk Calculator 0          Apfel Simplified Score      Flowsheet Row Most Recent Value   Apfel Simplified Score Calculator 2          Risk Analysis Index Results This Encounter    No data found in the last 1 encounters.       Stop Bang Score      Flowsheet Row Most Recent Value   Do you snore loudly? 0   Do you often feel tired or fatigued after your sleep? 0   Has anyone ever observed you stop breathing in your sleep? 0   Do you have or are you being treated for high blood pressure? 1   Recent BMI (Calculated) 34.5   Is BMI greater than 35 kg/m2? 0=No   Age older than 50 years old? 0=No   Is your neck circumference greater than 17 inches (Male) or 16 inches (Female)? 0   Gender - Male 1=Yes   STOP-BANG Total Score 2            Assessment and Plan:     Anesthesia:  The patient denies problems with anesthesia in the past such as PONV, prolonged sedation, awareness, dental damage, aspiration, cardiac arrest, difficult intubation, or unexpected hospital admissions.     Neuro:   The patient has diagnoses or significant findings on chart review or clinical presentation and evaluation significant for anxiety and depression. Handouts for preoperative brain exercises given to patient.    HEENT/Airway  The patient has diagnoses, significant findings on chart review, clinical presentation or evaluation of Deviated septum, Nasal turbinate hypertrophy,  Nasal congestion, Chronic maxillary sinusitis, Chronic ethmoidal sinusitis. C4-C5 Herniated Disc.   2/12/24 CT sinus   FINDINGS:  RESULTS:      Post-Surgical Findings: None      Sinus Chambers: Lobulated mucosal thickening is noted involving the  bilateral maxillary sinuses, left-greater-than-right. There is  opacification of scattered bilateral ethmoid air cells,  left-greater-than-right      Nasal Cavities: Visualized nasal cavities are patent.      Developmental Anomalies: Right-sided andres bullosa is noted of the  middle  turbinate.      Ostiomeatal Complex: Left infundibulum is obscured by mucosal  thickening      Nasopharynx: Visualized nasopharyngeal soft tissues are symmetric.  Airway is patent on the lateral topogram and the individual slices.      Temporal Bones: Visible mastoid air cells and middle ear cavities are  grossly clear.      Skull Base: No evidence of bony destruction.      Facial Soft Tissues: Normal.      Orbits: Grossly normal.      IMPRESSION:  Findings suggesting chronic paranasal sinusitis  Cardiovascular    RCRI  The patient meets 0-1 RCRI criteria and therefore has a less than 1% risk of major adverse cardiac complications.  METS  The patient's functional capacity capacity is greater than 4 METS.    Heart Failure  The patient has no known history of heart failure.  Additionally, the patient reports no symptoms of heart failure and demonstrates no signs of heart failure.  Hypertension Evaluation  The patient has a known history of hypertension that is controlled on verapamil.  Heart Rhythm Evaluation  The patient has no history of arrhythmias.  Heart Valve Evaluation  The patient has no known history of valvular heart disease. The patient has no symptoms or physical exam findings to suggest valvular heart disease.  Cardiology Evaluation  The patient is not followed by cardiology.    JOSHUA score which indicates a 0.1% risk of intraoperative or 30-day postoperative.    Pulmonary   No significant findings on chart review or clinical presentation and evaluation.    The patient has a stop bang score of 2, which places patient at low risk for having EMIL.    ARISCAT 0, low, 1.6% risk of in-hospital postoperative pulmonary complications  PRODIGY 8, intermediate risk of respiratory depression episode. Patient given PI sheet for preoperative deep breathing exercises.    Hematology  No diagnoses or significant findings on chart review or clinical presentation and evaluation.  Antiplatelet management   The patient is not  currently receiving antiplatelet therapy.  Anticoagulation management  The patient is not currently receiving anticoagulation therapy.    Caprini score 5, high risk of perioperative VTE.     Patient instructed to ambulate as soon as possible postoperatively to decrease thromboembolic risk. Initiate mechanical DVT prophylaxis as soon as possible and initiate chemical prophylaxis when deemed safe from a bleeding standpoint post surgery.     Transfusion Evaluation  A type and screen was obtained given the likelihood for perioperative transfusion of blood or blood products.    Gastrointestinal  No diagnoses or significant findings on chart review or clinical presentation and evaluation.  Eat 10- 0,  self-perceived oropharyngeal dysphagia scale (0-40)     Genitourinary  No diagnoses or significant findings on chart review or clinical presentation and evaluation.    Renal  The patient has no known history of chronic kidney disease.    Musculoskeletal  The patient has diagnoses or significant findings on chart review or clinical presentation and evaluation significant for Right cervical radiculopathy, C4-C5 herniated disc.    Endocrine  Diabetes Evaluation  The patient has no history of diabetes mellitus.  Thyroid Disease Evaluation  The patient has no history of thyroid disease.    ID  No diagnoses or significant findings on chart review or clinical presentation and evaluation.    -Preoperative medication instructions were provided and reviewed with the patient.  Any additional testing or evaluation was explained to the patient.  NPO Instructions were discussed, and the patient's questions were answered prior to conclusion of this encounter.

## 2024-04-04 ENCOUNTER — ANESTHESIA EVENT (OUTPATIENT)
Dept: OPERATING ROOM | Facility: HOSPITAL | Age: 29
End: 2024-04-04
Payer: COMMERCIAL

## 2024-04-04 DIAGNOSIS — J32.0 CHRONIC MAXILLARY SINUSITIS: Primary | ICD-10-CM

## 2024-04-04 RX ORDER — PREDNISONE 50 MG/1
50 TABLET ORAL DAILY
Qty: 5 TABLET | Refills: 0 | Status: SHIPPED | OUTPATIENT
Start: 2024-04-04 | End: 2024-04-21 | Stop reason: ALTCHOICE

## 2024-04-09 ENCOUNTER — HOSPITAL ENCOUNTER (OUTPATIENT)
Facility: HOSPITAL | Age: 29
Setting detail: OUTPATIENT SURGERY
Discharge: HOME | End: 2024-04-09
Attending: GENERAL PRACTICE | Admitting: GENERAL PRACTICE
Payer: COMMERCIAL

## 2024-04-09 ENCOUNTER — APPOINTMENT (OUTPATIENT)
Dept: PULMONOLOGY | Facility: CLINIC | Age: 29
End: 2024-04-09
Payer: COMMERCIAL

## 2024-04-09 ENCOUNTER — PHARMACY VISIT (OUTPATIENT)
Dept: PHARMACY | Facility: CLINIC | Age: 29
End: 2024-04-09
Payer: COMMERCIAL

## 2024-04-09 ENCOUNTER — ANESTHESIA (OUTPATIENT)
Dept: OPERATING ROOM | Facility: HOSPITAL | Age: 29
End: 2024-04-09
Payer: COMMERCIAL

## 2024-04-09 VITALS
RESPIRATION RATE: 14 BRPM | HEART RATE: 79 BPM | TEMPERATURE: 97 F | HEIGHT: 68 IN | BODY MASS INDEX: 32.74 KG/M2 | WEIGHT: 216.05 LBS | SYSTOLIC BLOOD PRESSURE: 124 MMHG | DIASTOLIC BLOOD PRESSURE: 75 MMHG | OXYGEN SATURATION: 95 %

## 2024-04-09 DIAGNOSIS — Z98.890 S/P FESS (FUNCTIONAL ENDOSCOPIC SINUS SURGERY): Primary | ICD-10-CM

## 2024-04-09 DIAGNOSIS — J32.0 CHRONIC MAXILLARY SINUSITIS: ICD-10-CM

## 2024-04-09 DIAGNOSIS — R09.81 NASAL CONGESTION: ICD-10-CM

## 2024-04-09 DIAGNOSIS — J34.2 DEVIATED SEPTUM: ICD-10-CM

## 2024-04-09 DIAGNOSIS — J34.3 NASAL TURBINATE HYPERTROPHY: ICD-10-CM

## 2024-04-09 DIAGNOSIS — J32.2 CHRONIC ETHMOIDAL SINUSITIS: ICD-10-CM

## 2024-04-09 LAB
ABO GROUP (TYPE) IN BLOOD: NORMAL
RH FACTOR (ANTIGEN D): NORMAL

## 2024-04-09 PROCEDURE — 2500000005 HC RX 250 GENERAL PHARMACY W/O HCPCS: Performed by: REGISTERED NURSE

## 2024-04-09 PROCEDURE — 2500000004 HC RX 250 GENERAL PHARMACY W/ HCPCS (ALT 636 FOR OP/ED): Performed by: REGISTERED NURSE

## 2024-04-09 PROCEDURE — 7100000001 HC RECOVERY ROOM TIME - INITIAL BASE CHARGE: Performed by: GENERAL PRACTICE

## 2024-04-09 PROCEDURE — 7100000010 HC PHASE TWO TIME - EACH INCREMENTAL 1 MINUTE: Performed by: GENERAL PRACTICE

## 2024-04-09 PROCEDURE — 30520 REPAIR OF NASAL SEPTUM: CPT | Performed by: GENERAL PRACTICE

## 2024-04-09 PROCEDURE — 3700000001 HC GENERAL ANESTHESIA TIME - INITIAL BASE CHARGE: Performed by: GENERAL PRACTICE

## 2024-04-09 PROCEDURE — 2500000001 HC RX 250 WO HCPCS SELF ADMINISTERED DRUGS (ALT 637 FOR MEDICARE OP): Performed by: GENERAL PRACTICE

## 2024-04-09 PROCEDURE — 3600000003 HC OR TIME - INITIAL BASE CHARGE - PROCEDURE LEVEL THREE: Performed by: GENERAL PRACTICE

## 2024-04-09 PROCEDURE — RXMED WILLOW AMBULATORY MEDICATION CHARGE

## 2024-04-09 PROCEDURE — A30520 PR REPAIR OF NASAL SEPTUM: Performed by: REGISTERED NURSE

## 2024-04-09 PROCEDURE — 96372 THER/PROPH/DIAG INJ SC/IM: CPT | Mod: 59 | Performed by: GENERAL PRACTICE

## 2024-04-09 PROCEDURE — 36415 COLL VENOUS BLD VENIPUNCTURE: CPT | Performed by: GENERAL PRACTICE

## 2024-04-09 PROCEDURE — 31240 NSL/SNS NDSC CNCH BULL RESCJ: CPT | Performed by: GENERAL PRACTICE

## 2024-04-09 PROCEDURE — 2500000004 HC RX 250 GENERAL PHARMACY W/ HCPCS (ALT 636 FOR OP/ED): Performed by: ANESTHESIOLOGY

## 2024-04-09 PROCEDURE — 7100000002 HC RECOVERY ROOM TIME - EACH INCREMENTAL 1 MINUTE: Performed by: GENERAL PRACTICE

## 2024-04-09 PROCEDURE — 2500000005 HC RX 250 GENERAL PHARMACY W/O HCPCS: Performed by: GENERAL PRACTICE

## 2024-04-09 PROCEDURE — 2500000004 HC RX 250 GENERAL PHARMACY W/ HCPCS (ALT 636 FOR OP/ED): Performed by: GENERAL PRACTICE

## 2024-04-09 PROCEDURE — 7100000009 HC PHASE TWO TIME - INITIAL BASE CHARGE: Performed by: GENERAL PRACTICE

## 2024-04-09 PROCEDURE — 2500000001 HC RX 250 WO HCPCS SELF ADMINISTERED DRUGS (ALT 637 FOR MEDICARE OP): Performed by: REGISTERED NURSE

## 2024-04-09 PROCEDURE — 31020 EXPLORATION MAXILLARY SINUS: CPT | Performed by: GENERAL PRACTICE

## 2024-04-09 PROCEDURE — 3700000002 HC GENERAL ANESTHESIA TIME - EACH INCREMENTAL 1 MINUTE: Performed by: GENERAL PRACTICE

## 2024-04-09 PROCEDURE — 3600000008 HC OR TIME - EACH INCREMENTAL 1 MINUTE - PROCEDURE LEVEL THREE: Performed by: GENERAL PRACTICE

## 2024-04-09 PROCEDURE — 2720000007 HC OR 272 NO HCPCS: Performed by: GENERAL PRACTICE

## 2024-04-09 PROCEDURE — 30140 RESECT INFERIOR TURBINATE: CPT | Performed by: GENERAL PRACTICE

## 2024-04-09 RX ORDER — SODIUM CHLORIDE, SODIUM LACTATE, POTASSIUM CHLORIDE, CALCIUM CHLORIDE 600; 310; 30; 20 MG/100ML; MG/100ML; MG/100ML; MG/100ML
100 INJECTION, SOLUTION INTRAVENOUS CONTINUOUS
Status: DISCONTINUED | OUTPATIENT
Start: 2024-04-09 | End: 2024-04-09 | Stop reason: HOSPADM

## 2024-04-09 RX ORDER — MIDAZOLAM HYDROCHLORIDE 1 MG/ML
INJECTION INTRAMUSCULAR; INTRAVENOUS AS NEEDED
Status: DISCONTINUED | OUTPATIENT
Start: 2024-04-09 | End: 2024-04-09

## 2024-04-09 RX ORDER — PROPOFOL 10 MG/ML
INJECTION, EMULSION INTRAVENOUS AS NEEDED
Status: DISCONTINUED | OUTPATIENT
Start: 2024-04-09 | End: 2024-04-09

## 2024-04-09 RX ORDER — ONDANSETRON HYDROCHLORIDE 2 MG/ML
4 INJECTION, SOLUTION INTRAVENOUS ONCE AS NEEDED
Status: DISCONTINUED | OUTPATIENT
Start: 2024-04-09 | End: 2024-04-09 | Stop reason: HOSPADM

## 2024-04-09 RX ORDER — DEXMEDETOMIDINE IN 0.9 % NACL 20 MCG/5ML
SYRINGE (ML) INTRAVENOUS AS NEEDED
Status: DISCONTINUED | OUTPATIENT
Start: 2024-04-09 | End: 2024-04-09

## 2024-04-09 RX ORDER — OXYMETAZOLINE HCL 0.05 %
2 SPRAY, NON-AEROSOL (ML) NASAL EVERY 12 HOURS PRN
Qty: 30 ML | Refills: 0 | Status: SHIPPED | OUTPATIENT
Start: 2024-04-09 | End: 2024-05-09

## 2024-04-09 RX ORDER — LIDOCAINE HYDROCHLORIDE AND EPINEPHRINE 10; 10 MG/ML; UG/ML
INJECTION, SOLUTION INFILTRATION; PERINEURAL AS NEEDED
Status: DISCONTINUED | OUTPATIENT
Start: 2024-04-09 | End: 2024-04-09 | Stop reason: HOSPADM

## 2024-04-09 RX ORDER — OXYCODONE HYDROCHLORIDE 5 MG/1
5 TABLET ORAL EVERY 4 HOURS PRN
Status: DISCONTINUED | OUTPATIENT
Start: 2024-04-09 | End: 2024-04-09 | Stop reason: HOSPADM

## 2024-04-09 RX ORDER — FENTANYL CITRATE 50 UG/ML
INJECTION, SOLUTION INTRAMUSCULAR; INTRAVENOUS AS NEEDED
Status: DISCONTINUED | OUTPATIENT
Start: 2024-04-09 | End: 2024-04-09

## 2024-04-09 RX ORDER — ROCURONIUM BROMIDE 10 MG/ML
INJECTION, SOLUTION INTRAVENOUS AS NEEDED
Status: DISCONTINUED | OUTPATIENT
Start: 2024-04-09 | End: 2024-04-09

## 2024-04-09 RX ORDER — ONDANSETRON HYDROCHLORIDE 2 MG/ML
INJECTION, SOLUTION INTRAVENOUS AS NEEDED
Status: DISCONTINUED | OUTPATIENT
Start: 2024-04-09 | End: 2024-04-09

## 2024-04-09 RX ORDER — DROPERIDOL 2.5 MG/ML
0.62 INJECTION, SOLUTION INTRAMUSCULAR; INTRAVENOUS ONCE AS NEEDED
Status: DISCONTINUED | OUTPATIENT
Start: 2024-04-09 | End: 2024-04-09 | Stop reason: HOSPADM

## 2024-04-09 RX ORDER — METHYLPREDNISOLONE 4 MG/1
TABLET ORAL
Qty: 21 TABLET | Refills: 0 | Status: SHIPPED | OUTPATIENT
Start: 2024-04-09 | End: 2024-04-21 | Stop reason: ALTCHOICE

## 2024-04-09 RX ORDER — ACETAMINOPHEN 500 MG
1000 TABLET ORAL EVERY 6 HOURS PRN
Qty: 30 TABLET | Refills: 0 | Status: SHIPPED | OUTPATIENT
Start: 2024-04-09 | End: 2024-04-19

## 2024-04-09 RX ORDER — TRIAMCINOLONE ACETONIDE 40 MG/ML
INJECTION, SUSPENSION INTRA-ARTICULAR; INTRAMUSCULAR AS NEEDED
Status: DISCONTINUED | OUTPATIENT
Start: 2024-04-09 | End: 2024-04-09 | Stop reason: HOSPADM

## 2024-04-09 RX ORDER — KETOROLAC TROMETHAMINE 30 MG/ML
INJECTION, SOLUTION INTRAMUSCULAR; INTRAVENOUS AS NEEDED
Status: DISCONTINUED | OUTPATIENT
Start: 2024-04-09 | End: 2024-04-09

## 2024-04-09 RX ORDER — DOCUSATE SODIUM 100 MG/1
100 CAPSULE, LIQUID FILLED ORAL 2 TIMES DAILY
Qty: 30 CAPSULE | Refills: 0 | Status: SHIPPED | OUTPATIENT
Start: 2024-04-09 | End: 2024-05-07 | Stop reason: WASHOUT

## 2024-04-09 RX ORDER — CEFAZOLIN 1 G/1
INJECTION, POWDER, FOR SOLUTION INTRAVENOUS AS NEEDED
Status: DISCONTINUED | OUTPATIENT
Start: 2024-04-09 | End: 2024-04-09

## 2024-04-09 RX ORDER — LIDOCAINE HCL/PF 100 MG/5ML
SYRINGE (ML) INTRAVENOUS AS NEEDED
Status: DISCONTINUED | OUTPATIENT
Start: 2024-04-09 | End: 2024-04-09

## 2024-04-09 RX ORDER — CELECOXIB 200 MG/1
200 CAPSULE ORAL 2 TIMES DAILY
Qty: 30 CAPSULE | Refills: 0 | Status: SHIPPED | OUTPATIENT
Start: 2024-04-09 | End: 2024-05-07 | Stop reason: WASHOUT

## 2024-04-09 RX ORDER — CEFAZOLIN SODIUM 1 G/50ML
1 SOLUTION INTRAVENOUS ONCE
Status: DISCONTINUED | OUTPATIENT
Start: 2024-04-09 | End: 2024-04-09 | Stop reason: HOSPADM

## 2024-04-09 RX ORDER — MUPIROCIN 20 MG/G
OINTMENT TOPICAL AS NEEDED
Status: DISCONTINUED | OUTPATIENT
Start: 2024-04-09 | End: 2024-04-09 | Stop reason: HOSPADM

## 2024-04-09 RX ORDER — TRANEXAMIC ACID 100 MG/ML
INJECTION, SOLUTION INTRAVENOUS AS NEEDED
Status: DISCONTINUED | OUTPATIENT
Start: 2024-04-09 | End: 2024-04-09

## 2024-04-09 RX ORDER — OXYCODONE HYDROCHLORIDE 5 MG/1
5 TABLET ORAL EVERY 6 HOURS PRN
Qty: 15 TABLET | Refills: 0 | Status: SHIPPED | OUTPATIENT
Start: 2024-04-09 | End: 2024-04-21 | Stop reason: ALTCHOICE

## 2024-04-09 RX ORDER — LIDOCAINE HYDROCHLORIDE 40 MG/ML
SOLUTION TOPICAL AS NEEDED
Status: DISCONTINUED | OUTPATIENT
Start: 2024-04-09 | End: 2024-04-09

## 2024-04-09 RX ORDER — OXYMETAZOLINE HCL 0.05 %
2 SPRAY, NON-AEROSOL (ML) NASAL EVERY 12 HOURS PRN
Status: DISCONTINUED | OUTPATIENT
Start: 2024-04-09 | End: 2024-04-09 | Stop reason: HOSPADM

## 2024-04-09 RX ORDER — MUPIROCIN 20 MG/G
1 OINTMENT TOPICAL 2 TIMES DAILY
Qty: 22 G | Refills: 0 | Status: SHIPPED | OUTPATIENT
Start: 2024-04-09 | End: 2024-05-07 | Stop reason: WASHOUT

## 2024-04-09 RX ADMIN — TRANEXAMIC ACID 200 MG: 100 INJECTION, SOLUTION INTRAVENOUS at 09:32

## 2024-04-09 RX ADMIN — ONDANSETRON 4 MG: 2 INJECTION, SOLUTION INTRAMUSCULAR; INTRAVENOUS at 11:06

## 2024-04-09 RX ADMIN — LIDOCAINE HYDROCHLORIDE 4 ML: 40 SOLUTION TOPICAL at 09:28

## 2024-04-09 RX ADMIN — KETOROLAC TROMETHAMINE 30 MG: 30 INJECTION, SOLUTION INTRAMUSCULAR at 11:10

## 2024-04-09 RX ADMIN — ROCURONIUM BROMIDE 60 MG: 10 INJECTION, SOLUTION INTRAVENOUS at 09:25

## 2024-04-09 RX ADMIN — ROCURONIUM BROMIDE 20 MG: 10 INJECTION, SOLUTION INTRAVENOUS at 10:16

## 2024-04-09 RX ADMIN — SODIUM CHLORIDE, POTASSIUM CHLORIDE, SODIUM LACTATE AND CALCIUM CHLORIDE 100 ML/HR: 600; 310; 30; 20 INJECTION, SOLUTION INTRAVENOUS at 08:12

## 2024-04-09 RX ADMIN — SUGAMMADEX 200 MG: 100 INJECTION, SOLUTION INTRAVENOUS at 11:22

## 2024-04-09 RX ADMIN — Medication 8 MCG: at 11:15

## 2024-04-09 RX ADMIN — MIDAZOLAM HYDROCHLORIDE 2 MG: 1 INJECTION, SOLUTION INTRAMUSCULAR; INTRAVENOUS at 09:14

## 2024-04-09 RX ADMIN — DEXAMETHASONE SODIUM PHOSPHATE 8 MG: 4 INJECTION INTRA-ARTICULAR; INTRALESIONAL; INTRAMUSCULAR; INTRAVENOUS; SOFT TISSUE at 09:32

## 2024-04-09 RX ADMIN — TRANEXAMIC ACID 200 MG: 100 INJECTION, SOLUTION INTRAVENOUS at 09:43

## 2024-04-09 RX ADMIN — TRANEXAMIC ACID 200 MG: 100 INJECTION, SOLUTION INTRAVENOUS at 09:39

## 2024-04-09 RX ADMIN — CEFAZOLIN 2 G: 330 INJECTION, POWDER, FOR SOLUTION INTRAMUSCULAR; INTRAVENOUS at 09:32

## 2024-04-09 RX ADMIN — Medication 8 MCG: at 09:33

## 2024-04-09 RX ADMIN — PROPOFOL 200 MG: 10 INJECTION, EMULSION INTRAVENOUS at 09:24

## 2024-04-09 RX ADMIN — TRANEXAMIC ACID 200 MG: 100 INJECTION, SOLUTION INTRAVENOUS at 09:48

## 2024-04-09 RX ADMIN — LIDOCAINE HYDROCHLORIDE 60 MG: 20 INJECTION, SOLUTION INTRAVENOUS at 09:24

## 2024-04-09 RX ADMIN — SODIUM CHLORIDE, POTASSIUM CHLORIDE, SODIUM LACTATE AND CALCIUM CHLORIDE: 600; 310; 30; 20 INJECTION, SOLUTION INTRAVENOUS at 11:20

## 2024-04-09 RX ADMIN — FENTANYL CITRATE 50 MCG: 50 INJECTION, SOLUTION INTRAMUSCULAR; INTRAVENOUS at 10:22

## 2024-04-09 RX ADMIN — TRANEXAMIC ACID 200 MG: 100 INJECTION, SOLUTION INTRAVENOUS at 09:54

## 2024-04-09 RX ADMIN — FENTANYL CITRATE 50 MCG: 50 INJECTION, SOLUTION INTRAMUSCULAR; INTRAVENOUS at 09:24

## 2024-04-09 SDOH — HEALTH STABILITY: MENTAL HEALTH: CURRENT SMOKER: 0

## 2024-04-09 ASSESSMENT — PAIN - FUNCTIONAL ASSESSMENT
PAIN_FUNCTIONAL_ASSESSMENT: 0-10

## 2024-04-09 ASSESSMENT — PAIN SCALES - GENERAL
PAINLEVEL_OUTOF10: 2
PAIN_LEVEL: 2
PAINLEVEL_OUTOF10: 0 - NO PAIN
PAINLEVEL_OUTOF10: 0 - NO PAIN

## 2024-04-09 NOTE — ANESTHESIA PREPROCEDURE EVALUATION
Patient: Dany Mayorga    Procedure Information       Date/Time: 04/09/24 0845    Procedures:       REPAIR SEPTUM NASAL CAVITY (Bilateral) - FESS image guided surgery      EXCISION NASAL TURBINATE (Bilateral)      SINUSOTOMY (Bilateral)      NASAL ENDOSCOPY WITH EXCISION TISSUE ETHMOID SINUS (Bilateral)      REDUCTION TURBINATE (Bilateral)    Location: [facility] / [facility]    Surgeons: Juan Francisco Nicole MD          Vitals:    04/09/24 0709   BP: (!) 144/94   Pulse: 69   Resp: 16   Temp: 36.6 °C (97.9 °F)   SpO2: 97%       Past Surgical History:   Procedure Laterality Date    COLONOSCOPY      EPIDURAL BLOCK INJECTION  July 2023    WISDOM TOOTH EXTRACTION       Past Medical History:   Diagnosis Date    Anxiety     Cervical disc disorder 10/29/2020    Chronic ethmoidal sinusitis     Chronic maxillary sinusitis     Chronic pain disorder 10/30/2020    Class 1 obesity with body mass index (BMI) of 32.0 to 32.9 in adult 04/02/2024    Depression 2009    Deviated septum     Extremity pain 10/29/2020    HTN (hypertension)     Migraine 2014    Nasal congestion     Nasal turbinate hypertrophy     Neck pain 10/29/2020    Personal history of other specified conditions 12/08/2021    History of fever    Right lower quadrant pain 12/08/2021    Abdominal pain, acute, right lower quadrant       Current Facility-Administered Medications:     ceFAZolin in dextrose (iso-os) (Ancef) IVPB 1 g, 1 g, intravenous, Once, Juan Francisco Nicole MD    lactated Ringer's infusion, 100 mL/hr, intravenous, Continuous, Allan White MD, Last Rate: 100 mL/hr at 04/09/24 0817, Continued by Anesthesia at 04/09/24 0817    oxymetazoline (Afrin) 0.05 % nasal spray 2 spray, 2 spray, Each Nostril, q12h PRN, Juan Francisco Nicole MD  Prior to Admission medications    Medication Sig Start Date End Date Taking? Authorizing Provider   lamoTRIgine (LaMICtal) 25 mg tablet Take 1 tablet (25 mg) by mouth once daily at bedtime.   Yes Historical Provider, MD   verapamil SR  (Calan-SR) 240 mg ER tablet Take 1 tablet (240 mg) by mouth once daily at bedtime. 11/7/23 5/5/24 Yes Martha Mcintosh DO   acetaminophen (Tylenol) 500 mg tablet Take 2 tablets (1,000 mg) by mouth every 6 hours if needed for mild pain (1 - 3) for up to 10 days. 4/9/24 4/19/24  Juan Francisco Nicole MD   celecoxib (CeleBREX) 200 mg capsule Take 1 capsule (200 mg) by mouth 2 times a day. 4/9/24   Juan Francisco Nicole MD   docusate sodium (Colace) 100 mg capsule Take 1 capsule (100 mg) by mouth 2 times a day. 4/9/24   Juan Francisco Nicole MD   fluticasone (Flonase) 50 mcg/actuation nasal spray Administer 2 sprays into each nostril once daily. Shake gently. Before first use, prime pump. After use, clean tip and replace cap. 3/5/24 4/4/24  Juan Francisco Nicole MD   methylPREDNISolone (Medrol, Nguyễn,) 4 mg tablets Follow schedule on package instructions 4/9/24   Juan Francisco Nicole MD   mupirocin (Bactroban) 2 % ointment 1 Application 2 times a day. 4/9/24   Juan Francisco Nicole MD   oxyCODONE (Roxicodone) 5 mg immediate release tablet Take 1 tablet (5 mg) by mouth every 6 hours if needed for severe pain (7 - 10) for up to 7 days. 4/9/24 4/16/24  Juan Francisco Nicole MD   oxymetazoline (Afrin) 0.05 % nasal spray Administer 2 sprays into each nostril every 12 hours if needed for congestion for up to 2 days. Also use for bothersome nasal bleeding.  Do not use for more than 3 days. 4/9/24 6/22/24  Juan Francisco Nicole MD   predniSONE (Deltasone) 50 mg tablet Take 1 tablet (50 mg) by mouth once daily for 5 days.  Patient not taking: Reported on 4/9/2024 4/4/24 4/9/24  Juan Francisco Nicole MD   sodium chloride (Ocean) 0.65 % nasal spray Administer 1 spray into each nostril if needed for congestion. Use frequently throughout the day to keep nasal cavity moist and to help clear debris (mucous, clot, etc.) 4/9/24 4/9/25  Juan Francisco Nicole MD     Allergies   Allergen Reactions    Other Other     Seasonal    Sulfa (Sulfonamide Antibiotics) Other     Social History     Tobacco Use    Smoking status: Never  "   Smokeless tobacco: Never   Substance Use Topics    Alcohol use: Yes     Alcohol/week: 1.0 - 2.0 standard drink of alcohol     Types: 1 - 2 Cans of beer per week         Chemistry    Lab Results   Component Value Date/Time     04/02/2024 1437    K 4.7 04/02/2024 1437     04/02/2024 1437    CO2 29 04/02/2024 1437    BUN 17 04/02/2024 1437    CREATININE 0.90 04/02/2024 1437    Lab Results   Component Value Date/Time    CALCIUM 9.1 04/02/2024 1437    ALKPHOS 114 05/26/2023 0929    AST 14 05/26/2023 0929    ALT 27 05/26/2023 0929    BILITOT 0.7 05/26/2023 0929          Lab Results   Component Value Date/Time    WBC 8.4 04/02/2024 1437    HGB 17.3 04/02/2024 1437    HCT 50.4 04/02/2024 1437     04/02/2024 1437     No results found for: \"PROTIME\", \"PTT\", \"INR\"  No results found for this or any previous visit (from the past 4464 hour(s)).  No results found for this or any previous visit from the past 1095 days.    Vitals:    04/09/24 0709   BP: (!) 144/94   Pulse: 69   Resp: 16   Temp: 36.6 °C (97.9 °F)   SpO2: 97%       Past Surgical History:   Procedure Laterality Date    COLONOSCOPY      EPIDURAL BLOCK INJECTION  July 2023    WISDOM TOOTH EXTRACTION       Past Medical History:   Diagnosis Date    Anxiety     Cervical disc disorder 10/29/2020    Chronic ethmoidal sinusitis     Chronic maxillary sinusitis     Chronic pain disorder 10/30/2020    Class 1 obesity with body mass index (BMI) of 32.0 to 32.9 in adult 04/02/2024    Depression 2009    Deviated septum     Extremity pain 10/29/2020    HTN (hypertension)     Migraine 2014    Nasal congestion     Nasal turbinate hypertrophy     Neck pain 10/29/2020    Personal history of other specified conditions 12/08/2021    History of fever    Right lower quadrant pain 12/08/2021    Abdominal pain, acute, right lower quadrant       Current Facility-Administered Medications:     ceFAZolin in dextrose (iso-os) (Ancef) IVPB 1 g, 1 g, intravenous, Once, Juan Francisco " MD Bonnie    lactated Ringer's infusion, 100 mL/hr, intravenous, Continuous, Allan White MD, Last Rate: 100 mL/hr at 04/09/24 0817, Continued by Anesthesia at 04/09/24 0817    oxymetazoline (Afrin) 0.05 % nasal spray 2 spray, 2 spray, Each Nostril, q12h PRN, Juan Francisco Nicole MD  Prior to Admission medications    Medication Sig Start Date End Date Taking? Authorizing Provider   lamoTRIgine (LaMICtal) 25 mg tablet Take 1 tablet (25 mg) by mouth once daily at bedtime.   Yes Historical Provider, MD   verapamil SR (Calan-SR) 240 mg ER tablet Take 1 tablet (240 mg) by mouth once daily at bedtime. 11/7/23 5/5/24 Yes Martha Mcintosh DO   acetaminophen (Tylenol) 500 mg tablet Take 2 tablets (1,000 mg) by mouth every 6 hours if needed for mild pain (1 - 3) for up to 10 days. 4/9/24 4/19/24  Juan Francisco Nicole MD   celecoxib (CeleBREX) 200 mg capsule Take 1 capsule (200 mg) by mouth 2 times a day. 4/9/24   Juan Francisco Nicole MD   docusate sodium (Colace) 100 mg capsule Take 1 capsule (100 mg) by mouth 2 times a day. 4/9/24   Juan Francisco Nicole MD   fluticasone (Flonase) 50 mcg/actuation nasal spray Administer 2 sprays into each nostril once daily. Shake gently. Before first use, prime pump. After use, clean tip and replace cap. 3/5/24 4/4/24  Juan Francisco Nicole MD   methylPREDNISolone (Medrol, Nguyễn,) 4 mg tablets Follow schedule on package instructions 4/9/24   Juan Francisco Nicole MD   mupirocin (Bactroban) 2 % ointment 1 Application 2 times a day. 4/9/24   Juan Francisco Nicole MD   oxyCODONE (Roxicodone) 5 mg immediate release tablet Take 1 tablet (5 mg) by mouth every 6 hours if needed for severe pain (7 - 10) for up to 7 days. 4/9/24 4/16/24  Juan Francisco Nicole MD   oxymetazoline (Afrin) 0.05 % nasal spray Administer 2 sprays into each nostril every 12 hours if needed for congestion for up to 2 days. Also use for bothersome nasal bleeding.  Do not use for more than 3 days. 4/9/24 6/22/24  Juan Francisco Nicole MD   predniSONE (Deltasone) 50 mg tablet Take 1 tablet (50  "mg) by mouth once daily for 5 days.  Patient not taking: Reported on 4/9/2024 4/4/24 4/9/24  Juan Francisco Nicole MD   sodium chloride (Ocean) 0.65 % nasal spray Administer 1 spray into each nostril if needed for congestion. Use frequently throughout the day to keep nasal cavity moist and to help clear debris (mucous, clot, etc.) 4/9/24 4/9/25  Juan Francisco Nicole MD     Allergies   Allergen Reactions    Other Other     Seasonal    Sulfa (Sulfonamide Antibiotics) Other     Social History     Tobacco Use    Smoking status: Never    Smokeless tobacco: Never   Substance Use Topics    Alcohol use: Yes     Alcohol/week: 1.0 - 2.0 standard drink of alcohol     Types: 1 - 2 Cans of beer per week         Chemistry    Lab Results   Component Value Date/Time     04/02/2024 1437    K 4.7 04/02/2024 1437     04/02/2024 1437    CO2 29 04/02/2024 1437    BUN 17 04/02/2024 1437    CREATININE 0.90 04/02/2024 1437    Lab Results   Component Value Date/Time    CALCIUM 9.1 04/02/2024 1437    ALKPHOS 114 05/26/2023 0929    AST 14 05/26/2023 0929    ALT 27 05/26/2023 0929    BILITOT 0.7 05/26/2023 0929          Lab Results   Component Value Date/Time    WBC 8.4 04/02/2024 1437    HGB 17.3 04/02/2024 1437    HCT 50.4 04/02/2024 1437     04/02/2024 1437     No results found for: \"PROTIME\", \"PTT\", \"INR\"  No results found for this or any previous visit (from the past 4464 hour(s)).  No results found for this or any previous visit from the past 1095 days.    Relevant Problems   Cardiac   (+) Essential hypertension      Neuro   (+) Cubital tunnel syndrome on right   (+) Generalized anxiety disorder   (+) Mild depression   (+) Right cervical radiculopathy      GI   (+) Irritable bowel syndrome with constipation      HEENT   (+) Chronic sinusitis       Clinical information reviewed:   Tobacco  Allergies  Meds  Problems  Med Hx  Surg Hx   Fam Hx  Soc   Hx        NPO Detail:  NPO/Void Status  Carbohydrate Drink Given Prior to Surgery? " : N  Date of Last Liquid: 04/08/24  Time of Last Liquid: 2000  Date of Last Solid: 05/09/24  Time of Last Solid: 2330  Last Intake Type: Clear fluids  Time of Last Void: 0500         PHYSICAL EXAM    Anesthesia Plan    History of general anesthesia?: yes  History of complications of general anesthesia?: no    ASA 2     general     The patient is not a current smoker.  Patient was not previously instructed to abstain from smoking on day of procedure.  Patient did not smoke on day of procedure.  Education provided regarding risk of obstructive sleep apnea.  intravenous induction   Anesthetic plan and risks discussed with patient.    Plan discussed with CRNA.

## 2024-04-09 NOTE — OP NOTE
REPAIR SEPTUM NASAL CAVITY (B), EXCISION NASAL TURBINATE (B), SINUSOTOMY (B), NASAL ENDOSCOPY WITH EXCISION TISSUE ETHMOID SINUS (B), REDUCTION TURBINATE (B) Operative Note     Date: 2024  OR Location: [facility]    Name: Dany Mayorga, : 1995, Age: 28 y.o., MRN: [id number], Sex: male    Diagnosis  Pre-op Diagnosis     * Deviated septum [J34.2]     * Nasal turbinate hypertrophy [J34.3]     * Nasal congestion [R09.81]     * Chronic maxillary sinusitis [J32.0]     * Chronic ethmoidal sinusitis [J32.2] Post-op Diagnosis     * Deviated septum [J34.2]     * Nasal turbinate hypertrophy [J34.3]     * Nasal congestion [R09.81]     * Chronic maxillary sinusitis [J32.0]     * Chronic ethmoidal sinusitis [J32.2]     Procedures  REPAIR SEPTUM NASAL CAVITY  34391 - HI SEPTOPLASTY/SUBMUCOUS RESECJ W/WO CARTILAGE GRF    EXCISION NASAL TURBINATE  16587 - HI SUBMUCOUS RESCJ INFERIOR TURBINATE PRTL/COMPL    SINUSOTOMY  90230 - HI SINUSOTOMY MAXILLARY ANTROTOMY INTRANASAL    NASAL ENDOSCOPY WITH EXCISION TISSUE ETHMOID SINUS  79015 - HI NASAL/SINUS NDSC W/PARTIAL ETHMOIDECTOMY    REDUCTION TURBINATE  30995 - HI NASAL/SINUS NDSC SURG W/CASSY BULLOSA RESECTION      Surgeons      * Juan Francisco Nicole - Primary    Resident/Fellow/Other Assistant:  Surgeon(s) and Role:    Procedure Summary  Anesthesia: Consult  ASA: II  Anesthesia Staff: CRNA: GARLAND HuangCRNA; VINOD Orantes-CRNA  Estimated Blood Loss: 35 mL  Intra-op Medications:   Administrations occurring from 0845 to 1215 on 24:   Medication Name Total Dose   lidocaine-epinephrine (Xylocaine W/EPI) 1 %-1:100,000 injection 4 mL   mupirocin (Bactroban) 2 % ointment 1 Application   oxymetazoline (Afrin) 0.05 % nasal spray 2 spray 1 spray   triamcinolone acetonide (Kenalog-40) injection 40 mg   lactated Ringer's infusion Cannot be calculated              Anesthesia Record               Intraprocedure I/O Totals          Intake    Tranexamic Acid 0.00 mL    The  total shown is the total volume documented since Anesthesia Start was filed.    lactated Ringer's infusion 1000.00 mL    Total Intake 1000 mL       Output    Est. Blood Loss 35 mL    Total Output 35 mL       Net    Net Volume 965 mL          Specimen: No specimens collected     Staff:   Circulator: Nereida Childers RN  Scrub Person: Kenzie Cerrato RN; Ness Bianchi RN         Drains and/or Catheters: * None in log *    Tourniquet Times:         Implants:     Findings: b/l MMA, R>L andres taken down b/l.  Left limited AE.  Leftward septum corrected.      Indications: Dany Mayorga is an 28 y.o. male who is having surgery for Deviated septum [J34.2]  Nasal turbinate hypertrophy [J34.3]  Nasal congestion [R09.81]  Chronic maxillary sinusitis [J32.0]  Chronic ethmoidal sinusitis [J32.2].       The patient was seen in the preoperative area. The risks, benefits, complications, treatment options, non-operative alternatives, expected recovery and outcomes were discussed with the patient. The possibilities of reaction to medication, pulmonary aspiration, injury to surrounding structures, bleeding, recurrent infection, the need for additional procedures, failure to diagnose a condition, and creating a complication requiring transfusion or operation were discussed with the patient. The patient concurred with the proposed plan, giving informed consent.  The site of surgery was properly noted/marked if necessary per policy. The patient has been actively warmed in preoperative area. Preoperative antibiotics have been ordered and given within 1 hours of incision. Venous thrombosis prophylaxis are not indicated.    Procedure Details:     Procedure  The patient was met in the preoperative holding area, and the operative plan was discussed.  All questions were answered.      The patient was brought back to the operative suite by anesthesia staff, laid supine on the operating table and general endotracheal anesthesia was  induced.  The patient was prepped and draped in the usual standard fashion and an operative pause was performed.  The CT guidance system was applied and registered.  Under endoscopic guidance, a total of 1.5cc of 1% lidocaine with 1:100,000 epinephrine was injected in the axilla of the middle turbinate and uncinate bilaterally.  Attention was turned to the right  nasal cavity first.       The andres bullosa was resected using a combination of through biting instruments and the microdebrider, taking care to not apply any force to manipulate the MT.      The residual middle turbinate was medialized gently and a double ball seeker was used to medialize the vertical portion of the uncinate.  A backbiter was used to incise the uncinate at the junction of the vertical and horiztal portions.  A pediatric 90 degree noah was then used to cass, grasp and remove the vertical portion of the uncintate in a posterior/inferior direction.  The double ball seeker was used to access the natural maxillary os and enter the maxillary sinus.  The natural os was then connected with the posterior foramen and the medial maxillary antrostomy created using the double ball seeker.  The antrostomy was widened using through biting instruments and the microdebrider making sure to respect the lamina papyracea and nasolacrimal duct.    After completion of the right  side, attention was turned to the septum which obstructed view of the contralateral side.      After this attention was turned to the septum.  A total of 9cc of 1% lidocain with 1:100,000 epinephrine was injected into the submucoperichondreal plane bilaterally, as well as into the inferior turbinates.    .  After this, attention was turned to the septum.  A left sided hemitransfixion incision was made using a 15 blade to access the submucoperichondrial plane.  This plane was then further developed using a urvashi and then chris elevator.  The dissection was carried back to and  beyond the bony/cartilagenous junction, down to the floor of the nose, and high into the nasal valve.  The caudal aspect of the septum was then reflected through the hemitranfixion incision and a 15 blade was used to access the submucoperichondrial plane on the right.  A similar dissection of the plane was peformed on this side.  After this, the deviated and obstructed septum was removed using a combination of the septal D knife, double action septal scissors, through biting forceps, and hermila forceps.  Care was taken to leave an L strut of dorsal/caudal septum of >1.5cm width.  Care was taken not to create or propagate fractures superiorly to endanger the skull base.  Superior and then inferior releasing incisions were made in the septum prior to any grasping force was applied.  The severe rightward deviation resulted in a right sided mucosal rent, which was later approximated using the quilting suture.       After completion of the septoplasty, a similar sinus dissection was performed on the left  as had been described as above.  After completion of bilateral sinus dissections and assurance of hemostasis, kenalog 40 infused stammberger foam was used to fill the dissection cavity.      Attention was then turned to the inferior turbinates.  A 2mm straight shot microdebrider with the turbinate blade was used to perform a submucous resection of the  left inferior turbinates with good effect.  A Marina elevator was then used to outfracture the inferior turbinates    The nasal passages were widely patent bilaterally. Quilting sutures made of 4-0 chromic were placed.  The left sided hemitransfixion incision was closed with 4-0 chromic.  Aguirre splints coated in bactroban were placed in the nasal cavity under direct visualization, and secured in place using 2-0 prolene.  An orogastric tube was passed to suction out the contents of the esophagus and stomach.  The patient was then turned back over to the anesthesia  service for extubation and transport to the post-operative recovery area.   Complications:  None; patient tolerated the procedure well.    Disposition: PACU - hemodynamically stable.  Condition: stable         Additional Details:      Attending Attestation:     Juan Francisco Nicole  Phone Number: [phone]

## 2024-04-09 NOTE — ANESTHESIA POSTPROCEDURE EVALUATION
Patient: Dany Mayorga    Procedure Summary       Date: 04/09/24 Room / Location: [facility] OR 01 / [facility]    Anesthesia Start: 0915 Anesthesia Stop: 1141    Procedures:       REPAIR SEPTUM NASAL CAVITY (Bilateral: Nose)      EXCISION NASAL TURBINATE (Bilateral: Nose)      SINUSOTOMY (Bilateral: Nose)      NASAL ENDOSCOPY WITH EXCISION TISSUE ETHMOID SINUS (Bilateral: Nose)      REDUCTION TURBINATE (Bilateral: Nose) Diagnosis:       Deviated septum      Nasal turbinate hypertrophy      Nasal congestion      Chronic maxillary sinusitis      Chronic ethmoidal sinusitis      (Deviated septum [J34.2])      (Nasal turbinate hypertrophy [J34.3])      (Nasal congestion [R09.81])      (Chronic maxillary sinusitis [J32.0])      (Chronic ethmoidal sinusitis [J32.2])    Surgeons: Juan Francisco Nicole MD Responsible Provider: ANGELICA Orantes    Anesthesia Type: general ASA Status: 2            Anesthesia Type: general    Vitals Value Taken Time   /72 04/09/24 1208   Temp 36.1 °C (97 °F) 04/09/24 1138   Pulse 84 04/09/24 1208   Resp 17 04/09/24 1208   SpO2 91 % 04/09/24 1208       Anesthesia Post Evaluation    Patient location during evaluation: PACU  Patient participation: complete - patient participated  Level of consciousness: awake  Pain score: 2  Pain management: adequate  Multimodal analgesia pain management approach  Airway patency: patent  Two or more strategies used to mitigate risk of obstructive sleep apnea  Cardiovascular status: acceptable  Respiratory status: acceptable  Hydration status: acceptable  Postoperative Nausea and Vomiting: none    There were no known notable events for this encounter.

## 2024-04-09 NOTE — ANESTHESIA PROCEDURE NOTES
Airway  Date/Time: 4/9/2024 9:28 AM  Urgency: elective    Airway not difficult    Staffing  Performed: CRNA   Authorized by: ANGELICA Orantes    Performed by: ANGELICA Orantes  Patient location during procedure: OR    Indications and Patient Condition  Indications for airway management: anesthesia  Spontaneous Ventilation: absent  Sedation level: deep  Preoxygenated: yes  Patient position: sniffing  Mask difficulty assessment: 1 - vent by mask  Planned trial extubation    Final Airway Details  Final airway type: endotracheal airway      Successful airway: ETT and MATY tube  Cuffed: yes   Successful intubation technique: video laryngoscopy (Montoya)  Endotracheal tube insertion site: oral  Blade: Julia  Blade size: #4  ETT size (mm): 8.0  Cormack-Lehane Classification: grade I - full view of glottis  Placement verified by: chest auscultation, capnometry and palpation of cuff   Number of attempts at approach: 1    Additional Comments  8.0 oral maty tube used because 7.5 back ordered

## 2024-04-09 NOTE — H&P
Assessment/Plan   Problem List Items Addressed This Visit               ICD-10-CM             ENT     Chronic sinusitis J32.9     Relevant Medications     fluticasone (Flonase) 50 mcg/actuation nasal spray     Other Relevant Orders     Case Request Operating Room: Repair Septum Nasal Cavity, Excision Nasal Turbinate, Sinusotomy, Nasal Endoscopy with Excision Tissue Ethmoid Sinus, Reduction Turbinate (Completed)     CBC     Type And Screen     Request for Pre-Admission Testing Visit     Case Request Operating Room: Repair Septum Nasal Cavity, Excision Nasal Turbinate, Sinusotomy, Nasal Endoscopy with Excision Tissue Ethmoid Sinus, Reduction Turbinate (Completed)     CBC     Type And Screen     Request for Pre-Admission Testing Visit     CBC     Type And Screen     Request for Pre-Admission Testing Visit     Deviated septum J34.2     Relevant Orders     Case Request Operating Room: Repair Septum Nasal Cavity, Excision Nasal Turbinate, Sinusotomy, Nasal Endoscopy with Excision Tissue Ethmoid Sinus, Reduction Turbinate (Completed)     CBC     Type And Screen     Request for Pre-Admission Testing Visit     Nasal turbinate hypertrophy J34.3     Relevant Orders     Case Request Operating Room: Repair Septum Nasal Cavity, Excision Nasal Turbinate, Sinusotomy, Nasal Endoscopy with Excision Tissue Ethmoid Sinus, Reduction Turbinate (Completed)     CBC     Type And Screen     Request for Pre-Admission Testing Visit     Nasal congestion R09.81     Relevant Orders     Case Request Operating Room: Repair Septum Nasal Cavity, Excision Nasal Turbinate, Sinusotomy, Nasal Endoscopy with Excision Tissue Ethmoid Sinus, Reduction Turbinate (Completed)     CBC     Type And Screen     Request for Pre-Admission Testing Visit         28yoM with chronic sinusitis, 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    Discussed consideration of surgical intervention and pt  would like to proceed.  Discussed septoturbinoplasty, andres bullosa takedown, b/l MMA and L Anterior ethmoidectomy.    Risks, benefits and indications of the procedure were discussed.  This included discussion of risks of pain, bleeding, infection, scarring with poor functional or cosmetic result, need for future procedures, or damage to surrounding structures to include regional blood vessels and nerves.  In particular, the risk of ongoing congestion/obstruction, ongoing sinusitis, CSF leak, vision change/loss was highlighted.  All questions were answered.  The patient/caregiver indicated understanding of the discussion and elected to proceed with the procedure.  Will schedule as available.         Follow up:  -plan for follow up in clinic as needed     All of the above findings, impressions, treatment planning and follow up plans were discussed with the patient who indicated understanding.  the patient was instructed to contact or return to clinic sooner if symptoms/signs persist or worsen despite the above management.       Juan Francisco Nicole MD  Otolaryngology - Head and Neck Surgery               History Of Present Illness  Dany Mayorga is a 28 y.o. male presenting for follow up of treated CT sinus.  Mild improvement while on medications but return to baseline congetion, sinus pressure and nonpurulent rhinorrhea.      Recall     Dany Mayorga is a 28 y.o. male presenting for concerns for approx 1-2mo of sore throat and swelling sensation in throat.    Seen in ED and treated with amoxicillin for tonsillitis.  CT neck completed which did not demonstrate PTA at the time.  Images not available but report does not suggest sinusitis.      Reports improvement but not resolution with antibiotics.    Notes ongoing globus and mild sore throat  Notes ongoing congestion and b/l maxillary sinus pressure.      The pt reports longstanding history of chronic maxillary pressure/discomfort and intermittent sinusitis.    Reports a history of allergic rhinitis but no improvement with topical steroids or antihistamines.      Reports previous discussion with ENT about FESS but not pursued at the time.       Denies significant prior history of recurrent tonsillitis  Denies significant history of GERD                       Past Medical History  He has a past medical history of Cervical disc disorder (10/29/2020), Chronic pain disorder (10/30/2020), Depression (2009), Extremity pain (10/29/2020), Migraine (2014), Neck pain (10/29/2020), Personal history of other specified conditions (12/08/2021), and Right lower quadrant pain (12/08/2021).     Surgical History  He has a past surgical history that includes Epidural block injection (July 2023).     Social History  He reports that he has never smoked. He has never used smokeless tobacco. He reports current alcohol use of about 1.0 - 2.0 standard drink of alcohol per week. He reports that he does not use drugs.     Family History  Family History          Family History   Problem Relation Name Age of Onset    Hypertension Mother Nora Mayorga      Graves' disease Mother Nora Mayorga      Depression Mother Nora Mukesh      Paranoid behavior Father Power Salmeronrafiq      Anxiety disorder Father Power Salmeronrafiq      Migraines Father Power Salmeronrafiq      Other (hld) Father Power Salmeronrafiq      Hyperlipidemia Father Power Salmeronrafiq      Hypertension Father Power Salmeronrafiq      Mental illness Father Power Salmeronarfiq      Migraines Sister Sonia      Thyroid disease Sister Sonia      Depression Sister Sonia      Migraines Brother        Asperger's syndrome Brother        Thyroid disease Maternal Grandfather Jeremiah Torres      Other (bca) Maternal Grandfather Jeremiah Melissa      Diabetes type II Maternal Grandfather Jeremiah Torres      Muscular dystrophy Maternal Grandfather Jeremiah Melissa      Cancer Maternal Grandfather Jeremiah Melissa      Diabetes Maternal Grandfather Jeremiah Melissa      Muscular dystrophy  Paternal Grandfather Cal Mayorga      Melanoma Paternal Grandfather Cal Mayorga      Early natural death Paternal Grandfather Cal Mayorga      GI problems Paternal Grandfather Cal Mayorga      Arthritis Maternal Grandmother Heleln Melissa      Cancer Maternal Grandmother Hellen Melissa      Depression Maternal Grandmother Hellen Melissa      Diabetes Maternal Grandmother Hellen Melissa      Depression Brother Shelton      Hypertension Brother Shelton      Mental illness Brother Shelton      Migraines Brother Shelton      GI problems Father's Brother Allen Mayorga              Allergies  Other and Sulfa (sulfonamide antibiotics)     Review of Systems  ROS: Pertinent positives as noted in HPI.     - CONSTITUTIONAL: Does not report weight loss, fever or chills.     - HEENT:   Ear: Does not report tinnitus, vertigo, hearing loss, otalgia, otorrhea  Nose: Does not report  ,  , epistaxis, decreased smell  Throat: Does not report pain, dysphagia, odynophagia  Larynx: Does not report hoarseness,  difficulty breathing, pain with speaking (odynophonia)  Neck: Does not report new masses, pain, swelling  Face: Does not report    ,  , swelling, numbness, weakness      - RESPIRATORY: Does not report SOB or cough.     - CV: Does not report palpitations or chest pain.      - GI: Does not report abdominal pain, nausea, vomiting or diarrhea.     - : Does not report dysuria or urinary frequency.     - MSK: Does not report myalgia or joint pain.     - SKIN: Does not report rash or pruritus.     - NEUROLOGICAL: Does not report headache or syncope.     - PSYCHIATRIC: Does not report recent changes in mood. Does not report anxiety or depression.           Physical Exam:      GENERAL:   Alert & Oriented to person, place and time; Normal affect and appearance. Well developed and well nourished. Conversant & cooperative with examination.      HEAD:   Normocephalic, atraumatic. No sinus tenderness to palpation. Normal parotid bilaterally. Normal facial  strength.      NEUROLOGIC:   Cranial nerves II-XII grossly intact, gait WNL. Normal mood and affect.     EYES:   Extraocular movements intact. Pupils equal, round, reactive to light and accommodation. No nystagmus, no ptosis. no scleral injection.     EAR:   Normal auricle. No discomfort or TTP with manipulation.   Handheld otoscopic exam showed normal external auditory canals bilaterally. No purulence or EAC inflammation. Minimal cerumen.   Right tympanic membrane clear and mobile without evidence of perforation, retraction or middle ear effusion.   Left tympanic membrane clear and mobile without evidence of perforation, retraction or middle ear effusion.        No external deformity. No external nasal lesions, lacerations, or scars. Nasal tip symmetrical with normal nasal valves.   Nasal cavity with posterior leftward septum, edematous  mucosa and turbinates. Purulent debris eminating from under right MT/IT and draining down nasopharynx.       OC/OP:   Mucous membranes moist, no masses, lesions or exudates.   Normal tongue, floor of mouth, teeth, gums, lips. Purulent debris on  posterior pharyngeal wall.    Cryptic 2+  tonsils without erythema, exudate or obvious calculi      NECK:   No neck masses or thyroid enlargement. Trachea midline. No tenderness to palpation     LYMPHATIC:   No cervical lymphadenopathy.      RESPIRATORY:   Symmetric chest elevation & no retractions. No significant hoarseness. No increased work of breathing.     CV:   No clubbing or cyanosis. No obvious edema     Skin:   No facial rashes, vesicles or lesions.      Extremities:   No gross abnormalities        Clinic Procedure           Information review:  External sources (notes, imaging, lab results) listed below personally reviewed to aid in medical decision making process.  -  CT sinus   -2/12/24  with L>R maxillary and ethmoid mucosal thickenign and polypoid debris.  R>L andres bullosa and leftward deviated septum.   -

## 2024-04-09 NOTE — DISCHARGE INSTRUCTIONS
Instructions:  Return to clinic in one week for removal of nasal splints.  Use nasal saline several times a day to avoid nasal crusting. Avoid blowing your nose. Cough and sneeze w/ mouth open.  Usually splints are left in the nose after the procedure, so you will not be able to breathe very well through your nose.  Minimal bleeding from nose is expected for first 2-3 days post operatively. Use Afrin twice a day if you are having bleeding or congestion for the first 3 days after surgery.  Use nasal slings of gauze to collect nasal drainage as needed.  Swelling and bruising is not unusual and you may develop “black eyes” which will resolve.  Your nose will feel more congested because of blood and mucus until splints are removed.   Reduced energy for several days to weeks postoperatively is common.  You should be able to return to work in 5 to 7 days, unless you work in a dirty or polluted environment.   Your nose is “fragile” for at least 6 weeks; avoid trauma to your nose in this period.    Use percocet as needed for pain. You should take colace to help prevent constipation if you are taking percocet regularly.  You can shower as normal. Avoid strenuous activity for 2 full weeks. Gradually increase activity thereafter according to your comfort level.  Plan to guard your nose from trauma for the next 6 weeks.  Bacterial infection is a concern with the splints in place so you will be provided with antibiotics, however, if you develop severe pain not controlled by medication, fever >101 degrees, skin discoloration (other than bruising), or pass out within a week of surgery, please contact us.

## 2024-04-15 ENCOUNTER — OFFICE VISIT (OUTPATIENT)
Dept: OTOLARYNGOLOGY | Facility: CLINIC | Age: 29
End: 2024-04-15
Payer: COMMERCIAL

## 2024-04-15 ENCOUNTER — OFFICE VISIT (OUTPATIENT)
Dept: PRIMARY CARE | Facility: CLINIC | Age: 29
End: 2024-04-15
Payer: COMMERCIAL

## 2024-04-15 VITALS
BODY MASS INDEX: 32.83 KG/M2 | OXYGEN SATURATION: 98 % | HEIGHT: 68 IN | WEIGHT: 216.6 LBS | RESPIRATION RATE: 17 BRPM | DIASTOLIC BLOOD PRESSURE: 70 MMHG | HEART RATE: 97 BPM | SYSTOLIC BLOOD PRESSURE: 132 MMHG

## 2024-04-15 VITALS — WEIGHT: 216 LBS | BODY MASS INDEX: 32.84 KG/M2

## 2024-04-15 DIAGNOSIS — Z00.00 ROUTINE GENERAL MEDICAL EXAMINATION AT A HEALTH CARE FACILITY: Primary | ICD-10-CM

## 2024-04-15 DIAGNOSIS — J32.0 CHRONIC MAXILLARY SINUSITIS: Primary | ICD-10-CM

## 2024-04-15 DIAGNOSIS — E55.9 VITAMIN D DEFICIENCY: ICD-10-CM

## 2024-04-15 DIAGNOSIS — G43.009 MIGRAINE WITHOUT AURA, NOT REFRACTORY: ICD-10-CM

## 2024-04-15 PROCEDURE — 1036F TOBACCO NON-USER: CPT | Performed by: GENERAL PRACTICE

## 2024-04-15 PROCEDURE — 3078F DIAST BP <80 MM HG: CPT | Performed by: FAMILY MEDICINE

## 2024-04-15 PROCEDURE — 99214 OFFICE O/P EST MOD 30 MIN: CPT | Performed by: FAMILY MEDICINE

## 2024-04-15 PROCEDURE — 3008F BODY MASS INDEX DOCD: CPT | Performed by: GENERAL PRACTICE

## 2024-04-15 PROCEDURE — 99024 POSTOP FOLLOW-UP VISIT: CPT | Performed by: GENERAL PRACTICE

## 2024-04-15 PROCEDURE — 3075F SYST BP GE 130 - 139MM HG: CPT | Performed by: FAMILY MEDICINE

## 2024-04-15 PROCEDURE — 3008F BODY MASS INDEX DOCD: CPT | Performed by: FAMILY MEDICINE

## 2024-04-15 ASSESSMENT — PATIENT HEALTH QUESTIONNAIRE - PHQ9
5. POOR APPETITE OR OVEREATING: SEVERAL DAYS
SUM OF ALL RESPONSES TO PHQ9 QUESTIONS 1 AND 2: 2
8. MOVING OR SPEAKING SO SLOWLY THAT OTHER PEOPLE COULD HAVE NOTICED. OR THE OPPOSITE, BEING SO FIGETY OR RESTLESS THAT YOU HAVE BEEN MOVING AROUND A LOT MORE THAN USUAL: SEVERAL DAYS
1. LITTLE INTEREST OR PLEASURE IN DOING THINGS: SEVERAL DAYS
3. TROUBLE FALLING OR STAYING ASLEEP OR SLEEPING TOO MUCH: MORE THAN HALF THE DAYS
SUM OF ALL RESPONSES TO PHQ9 QUESTIONS 1 AND 2: 0
6. FEELING BAD ABOUT YOURSELF - OR THAT YOU ARE A FAILURE OR HAVE LET YOURSELF OR YOUR FAMILY DOWN: NOT AT ALL
2. FEELING DOWN, DEPRESSED OR HOPELESS: SEVERAL DAYS
2. FEELING DOWN, DEPRESSED OR HOPELESS: NOT AT ALL
7. TROUBLE CONCENTRATING ON THINGS, SUCH AS READING THE NEWSPAPER OR WATCHING TELEVISION: SEVERAL DAYS
10. IF YOU CHECKED OFF ANY PROBLEMS, HOW DIFFICULT HAVE THESE PROBLEMS MADE IT FOR YOU TO DO YOUR WORK, TAKE CARE OF THINGS AT HOME, OR GET ALONG WITH OTHER PEOPLE: SOMEWHAT DIFFICULT
1. LITTLE INTEREST OR PLEASURE IN DOING THINGS: NOT AT ALL
9. THOUGHTS THAT YOU WOULD BE BETTER OFF DEAD, OR OF HURTING YOURSELF: NOT AT ALL
4. FEELING TIRED OR HAVING LITTLE ENERGY: SEVERAL DAYS
SUM OF ALL RESPONSES TO PHQ QUESTIONS 1-9: 8

## 2024-04-15 ASSESSMENT — ENCOUNTER SYMPTOMS
FEVER: 0
AURA: 1
FOCAL SENSORY LOSS: 0
DIZZINESS: 1
HEADACHES: 1
VOMITING: 0
FATIGUE: 1
LIGHT-HEADEDNESS: 0
SLURRED SPEECH: 0
BOWEL INCONTINENCE: 0
BACK PAIN: 0
VISUAL CHANGE: 1
NEAR-SYNCOPE: 1
DIAPHORESIS: 1
MEMORY LOSS: 0
ABDOMINAL PAIN: 0
FOCAL WEAKNESS: 0
LOSS OF BALANCE: 1
VERTIGO: 0
WEAKNESS: 0
NAUSEA: 0
CLUMSINESS: 1
SHORTNESS OF BREATH: 1
CONFUSION: 1
PALPITATIONS: 1
NEUROLOGIC COMPLAINT: 1
NECK PAIN: 0
ALTERED MENTAL STATUS: 1

## 2024-04-15 NOTE — PROGRESS NOTES
Subjective   Patient ID: Dany Mayorga is a 28 y.o. male who presents for Migraine (Intermittent weekly, Worse yesterday).      Acute Neurological Problem  The patient's primary symptoms include an altered mental status, clumsiness, a loss of balance, near-syncope and a visual change. The patient's pertinent negatives include no focal sensory loss, focal weakness, memory loss, slurred speech, syncope or weakness. This is a chronic problem. The current episode started yesterday. The neurological problem developed suddenly. The problem has been gradually improving since onset. There was facial focality noted. Associated symptoms include an aura, confusion, diaphoresis, dizziness, fatigue, headaches, palpitations and shortness of breath. Pertinent negatives include no abdominal pain, auditory change, back pain, bladder incontinence, bowel incontinence, chest pain, fever, light-headedness, nausea, neck pain, vertigo or vomiting. Past treatments include acetaminophen, bed rest, drinking, eating and sleep. The treatment provided mild relief. There is no history of a CVA, head trauma or seizures.     Current Outpatient Medications on File Prior to Visit   Medication Sig Dispense Refill    [] acetaminophen (Tylenol) 500 mg tablet Take 2 tablets (1,000 mg) by mouth every 6 hours if needed for mild pain (1 - 3) for up to 10 days. 30 tablet 0    celecoxib (CeleBREX) 200 mg capsule Take 1 capsule (200 mg) by mouth 2 times a day. 30 capsule 0    docusate sodium (Colace) 100 mg capsule Take 1 capsule (100 mg) by mouth 2 times a day. 30 capsule 0    lamoTRIgine (LaMICtal) 25 mg tablet Take 1 tablet (25 mg) by mouth once daily at bedtime.      mupirocin (Bactroban) 2 % ointment Apply 1 Application topically 2 times a day for 14 days. After 2 weeks, transition to use of nasal saline gel. 22 g 0    oxymetazoline (Afrin) 0.05 % nasal spray Administer 2 sprays into each nostril every 12 hours if needed for congestion for up  to 2 days. Also use for bothersome nasal bleeding.  Do not use for more than 3 days. 30 mL 0    sodium chloride (Ocean) 0.65 % nasal spray Administer 1 spray into each nostril if needed for congestion. Use frequently throughout the day to keep nasal cavity moist and to help clear debris (mucous, clot, etc.) 30 mL 12    verapamil SR (Calan-SR) 240 mg ER tablet Take 1 tablet (240 mg) by mouth once daily at bedtime. 90 tablet 1    fluticasone (Flonase) 50 mcg/actuation nasal spray Administer 2 sprays into each nostril once daily. Shake gently. Before first use, prime pump. After use, clean tip and replace cap. 16 g 2    [DISCONTINUED] methylPREDNISolone (Medrol, Nguyễn,) 4 mg tablets Follow schedule on package instructions (Patient not taking: Reported on 4/15/2024) 21 tablet 0    [DISCONTINUED] oxyCODONE (Roxicodone) 5 mg immediate release tablet Take 1 tablet (5 mg) by mouth every 6 hours if needed for severe pain (7 - 10) for up to 7 days. (Patient not taking: Reported on 4/15/2024) 15 tablet 0    [DISCONTINUED] predniSONE (Deltasone) 50 mg tablet Take 1 tablet (50 mg) by mouth once daily for 5 days. (Patient not taking: Reported on 4/9/2024) 5 tablet 0     No current facility-administered medications on file prior to visit.        Review of Systems   Constitutional:  Positive for diaphoresis and fatigue. Negative for fever.   Respiratory:  Positive for shortness of breath.    Cardiovascular:  Positive for palpitations and near-syncope. Negative for chest pain.   Gastrointestinal:  Negative for abdominal pain, bowel incontinence, nausea and vomiting.   Genitourinary:  Negative for bladder incontinence.   Musculoskeletal:  Negative for back pain and neck pain.   Neurological:  Positive for dizziness, headaches and loss of balance. Negative for vertigo, focal weakness, syncope, weakness and light-headedness.   Psychiatric/Behavioral:  Positive for confusion. Negative for memory loss.        Objective   /70   Pulse  "97   Resp 17   Ht 1.727 m (5' 8\")   Wt 98.2 kg (216 lb 9.6 oz)   SpO2 98%   BMI 32.93 kg/m²   BSA: 2.17 meters squared  Growth percentiles: Facility age limit for growth %ajay is 20 years. Facility age limit for growth %ajay is 20 years.   Admission on 04/09/2024, Discharged on 04/09/2024   Component Date Value Ref Range Status    ABO TYPE 04/09/2024 O   Final    Rh TYPE 04/09/2024 POS   Final      Physical Exam  Constitutional:       General: He is not in acute distress.  Eyes:      Extraocular Movements: Extraocular movements intact.   Cardiovascular:      Rate and Rhythm: Normal rate and regular rhythm.   Pulmonary:      Breath sounds: Normal breath sounds.   Abdominal:      General: Bowel sounds are normal.   Musculoskeletal:         General: Normal range of motion.      Cervical back: No rigidity.   Skin:     Findings: No rash.   Neurological:      General: No focal deficit present.      Mental Status: He is alert.   Psychiatric:         Thought Content: Thought content normal.         Assessment/Plan   Problem List Items Addressed This Visit             ICD-10-CM    Migraine without aura, not refractory G43.009     Continue verapamil   Will start Ubrelvy as needed. Monitor headache  . Some suggestions for preventing or controlling your headache:  - Magnesium (preferably glycinate but oxide or sulfate are acceptable 400-500 mg daily is recommended as a potential migraine preventative treatment by the American Headache Society. Side effects include diarrhea.  - Riboflavin (vitamin B2) can be another beneficial migraine preventative treatment, 200 mg twice a day.  - Another potential beneficial migraine preventative treatment is coenzyme Q10 150 mg twice a day.  - Melatonin 3-5 mg 2-4 hours before bedtime can be helpful for difficulty with sleeping and for headaches, especially cluster headaches but also migraines.  - Avoid triggers that can cause or worsen migraines (food, lack of sleep, stress)  - Keep a " diary of your headaches to note how often you get them, how long they last and any other helpful information  - Avoid taking medication for treatment of headaches (NOT preventative medication) more than 3 days per week. This includes both prescription medication and over the counter medication.           Vitamin D deficiency E55.9    Relevant Orders    Vitamin B12 (Completed)    Vitamin D 25-Hydroxy,Total (for eval of Vitamin D levels) (Completed)    Routine general medical examination at a health care facility - Primary Z00.00    Relevant Orders    Lipid Panel (Completed)    CBC (Completed)    TSH with reflex to Free T4 if abnormal (Completed)    Comprehensive Metabolic Panel (Completed)

## 2024-04-15 NOTE — PROGRESS NOTES
CC: postoperative visit      HPI: The pt presents for postoperative evaluation s/p septoFESS (b/l MMA, andres takedown)  on DOS 4/9/24.    No acute complaints currently.  Stable postoperative course.  No fevers, chills, N/V, SOB.  No significant bleeding.  Back to regular activity levels.  No dietary restrictions.    PMHx: Reviewed, see previous note for full details.  No hx of significant cardiopulmonary health disorders or bleeding dyscrasias.   PSHx: Reviewed, see previous note for full details.  No hx of problems with GETA or complications from surgery.    Meds:  reviewed, see medication reconciliation in [employer]      GENERAL:  Alert and oriented; normal affect and appearance.  Well developed and well nourished.  Conversant and cooperative with examination.     HEAD:  Normocephalic and atraumatic.     NEUROLOGIC:  Cranial nerves II-XII grossly intact.  Gait WNL.  Normal mood and affect.   EYES:  EOMI, PERRL.  No nystagmus or ptosis noted.   EARS:  Normal auricle.  Otoscopic exam showed normal EACs without purulence or inflammation.  Tympanic membranes clear and mobile without evidence of perforation, retraction or effusion.    NOSE:  No external deformity.  Aguirre splints removed.  Nasal cavity clear with midline septum, healing  mucosa and turbinates.  No lesions, masses, purulence or polyps noted.     OC/OP: Mucous membranes moist without masses, lesions or exudates.  Normal tongue, floor of mouth, teeth, gums, and lips.  Normal tonsils and posterior oropharyngeal walls.     NECK:  No visible or palpable neck masses.  No tenderness, edema, or crepitus.  Trachea midline.   LYMPHATIC:  No cervical lymphadenopathy   RESPIRATORY:  No stridor or hoarseness.  No increased work of breathing or retractions. Symmetric chest elevation.   CV:  No clubbing or cyanosis   SKIN:  No facial rashes, vesicles or lesions.         A/P: Overall doing well without evidence of post-surgical complications.  Counseled on local wound  care and expected postoperative healing course.  Mild lightheadedness after splint removal but recovered quickly.  Plan for ENT f/u  in 2 weeks for debridement. .

## 2024-04-19 ENCOUNTER — LAB (OUTPATIENT)
Dept: LAB | Facility: LAB | Age: 29
End: 2024-04-19
Payer: COMMERCIAL

## 2024-04-19 DIAGNOSIS — E55.9 VITAMIN D DEFICIENCY: ICD-10-CM

## 2024-04-19 DIAGNOSIS — I10 ESSENTIAL HYPERTENSION: ICD-10-CM

## 2024-04-19 DIAGNOSIS — Z00.00 ROUTINE GENERAL MEDICAL EXAMINATION AT A HEALTH CARE FACILITY: ICD-10-CM

## 2024-04-19 LAB
25(OH)D3 SERPL-MCNC: 25 NG/ML (ref 30–100)
ALBUMIN SERPL BCP-MCNC: 4.4 G/DL (ref 3.4–5)
ALP SERPL-CCNC: 105 U/L (ref 33–120)
ALT SERPL W P-5'-P-CCNC: 32 U/L (ref 10–52)
ANION GAP SERPL CALC-SCNC: 13 MMOL/L (ref 10–20)
AST SERPL W P-5'-P-CCNC: 17 U/L (ref 9–39)
BILIRUB SERPL-MCNC: 0.8 MG/DL (ref 0–1.2)
BUN SERPL-MCNC: 22 MG/DL (ref 6–23)
CALCIUM SERPL-MCNC: 9.3 MG/DL (ref 8.6–10.6)
CHLORIDE SERPL-SCNC: 106 MMOL/L (ref 98–107)
CHOLEST SERPL-MCNC: 161 MG/DL (ref 0–199)
CHOLESTEROL/HDL RATIO: 3.8
CO2 SERPL-SCNC: 26 MMOL/L (ref 21–32)
CREAT SERPL-MCNC: 0.78 MG/DL (ref 0.5–1.3)
EGFRCR SERPLBLD CKD-EPI 2021: >90 ML/MIN/1.73M*2
ERYTHROCYTE [DISTWIDTH] IN BLOOD BY AUTOMATED COUNT: 12.4 % (ref 11.5–14.5)
GLUCOSE SERPL-MCNC: 93 MG/DL (ref 74–99)
HCT VFR BLD AUTO: 52.7 % (ref 41–52)
HDLC SERPL-MCNC: 42.1 MG/DL
HGB BLD-MCNC: 17.1 G/DL (ref 13.5–17.5)
LDLC SERPL CALC-MCNC: 91 MG/DL
MCH RBC QN AUTO: 30.7 PG (ref 26–34)
MCHC RBC AUTO-ENTMCNC: 32.4 G/DL (ref 32–36)
MCV RBC AUTO: 95 FL (ref 80–100)
NON HDL CHOLESTEROL: 119 MG/DL (ref 0–149)
NRBC BLD-RTO: 0 /100 WBCS (ref 0–0)
PLATELET # BLD AUTO: 220 X10*3/UL (ref 150–450)
POTASSIUM SERPL-SCNC: 4.9 MMOL/L (ref 3.5–5.3)
PROT SERPL-MCNC: 7 G/DL (ref 6.4–8.2)
RBC # BLD AUTO: 5.57 X10*6/UL (ref 4.5–5.9)
SODIUM SERPL-SCNC: 140 MMOL/L (ref 136–145)
TRIGL SERPL-MCNC: 140 MG/DL (ref 0–149)
TSH SERPL-ACNC: 1.63 MIU/L (ref 0.44–3.98)
VIT B12 SERPL-MCNC: 478 PG/ML (ref 211–911)
VLDL: 28 MG/DL (ref 0–40)
WBC # BLD AUTO: 9.3 X10*3/UL (ref 4.4–11.3)

## 2024-04-19 PROCEDURE — 36415 COLL VENOUS BLD VENIPUNCTURE: CPT

## 2024-04-19 PROCEDURE — 80053 COMPREHEN METABOLIC PANEL: CPT

## 2024-04-19 PROCEDURE — 80061 LIPID PANEL: CPT

## 2024-04-19 PROCEDURE — 82607 VITAMIN B-12: CPT

## 2024-04-19 PROCEDURE — 84443 ASSAY THYROID STIM HORMONE: CPT

## 2024-04-19 PROCEDURE — 85027 COMPLETE CBC AUTOMATED: CPT

## 2024-04-19 PROCEDURE — 82306 VITAMIN D 25 HYDROXY: CPT

## 2024-04-21 PROBLEM — Z00.00 ROUTINE GENERAL MEDICAL EXAMINATION AT A HEALTH CARE FACILITY: Status: ACTIVE | Noted: 2024-04-21

## 2024-04-21 ASSESSMENT — ENCOUNTER SYMPTOMS
LOSS OF BALANCE: 1
FATIGUE: 1
FEVER: 0
BACK PAIN: 0
NECK PAIN: 0
CLUMSINESS: 1
HEADACHES: 1
MEMORY LOSS: 0
DIZZINESS: 1
FOCAL WEAKNESS: 0
ALTERED MENTAL STATUS: 1
SLURRED SPEECH: 0
AURA: 1
CONFUSION: 1
NEUROLOGIC COMPLAINT: 1
VERTIGO: 0
NEAR-SYNCOPE: 1
VOMITING: 0
VISUAL CHANGE: 1
BOWEL INCONTINENCE: 0
SHORTNESS OF BREATH: 1
FOCAL SENSORY LOSS: 0
NAUSEA: 0
PALPITATIONS: 1
WEAKNESS: 0
LIGHT-HEADEDNESS: 0
DIAPHORESIS: 1
ABDOMINAL PAIN: 0

## 2024-04-21 NOTE — ASSESSMENT & PLAN NOTE
Continue verapamil   Will start Ubrelvy as needed. Monitor headache  . Some suggestions for preventing or controlling your headache:  - Magnesium (preferably glycinate but oxide or sulfate are acceptable 400-500 mg daily is recommended as a potential migraine preventative treatment by the American Headache Society. Side effects include diarrhea.  - Riboflavin (vitamin B2) can be another beneficial migraine preventative treatment, 200 mg twice a day.  - Another potential beneficial migraine preventative treatment is coenzyme Q10 150 mg twice a day.  - Melatonin 3-5 mg 2-4 hours before bedtime can be helpful for difficulty with sleeping and for headaches, especially cluster headaches but also migraines.  - Avoid triggers that can cause or worsen migraines (food, lack of sleep, stress)  - Keep a diary of your headaches to note how often you get them, how long they last and any other helpful information  - Avoid taking medication for treatment of headaches (NOT preventative medication) more than 3 days per week. This includes both prescription medication and over the counter medication.

## 2024-04-29 ENCOUNTER — OFFICE VISIT (OUTPATIENT)
Dept: OTOLARYNGOLOGY | Facility: CLINIC | Age: 29
End: 2024-04-29
Payer: COMMERCIAL

## 2024-04-29 VITALS — BODY MASS INDEX: 32.84 KG/M2 | WEIGHT: 216 LBS

## 2024-04-29 DIAGNOSIS — J32.0 CHRONIC MAXILLARY SINUSITIS: Primary | ICD-10-CM

## 2024-04-29 PROCEDURE — 31237 NSL/SINS NDSC SURG BX POLYPC: CPT | Performed by: GENERAL PRACTICE

## 2024-04-29 PROCEDURE — 3008F BODY MASS INDEX DOCD: CPT | Performed by: GENERAL PRACTICE

## 2024-04-29 PROCEDURE — 99213 OFFICE O/P EST LOW 20 MIN: CPT | Performed by: GENERAL PRACTICE

## 2024-04-29 PROCEDURE — 1036F TOBACCO NON-USER: CPT | Performed by: GENERAL PRACTICE

## 2024-04-29 NOTE — PROGRESS NOTES
CC: 2nd postoperative visit       HPI:     28yoM s/p septoFESS presents for follow up.    Reports good nasal airflow and no sinus pain/pressure  No purulent rhinorrhea  Compliant with irrigations.     recall    The pt presents for postoperative evaluation s/p septoFESS (b/l MMA, andres takedown)  on DOS 4/9/24.    No acute complaints currently.  Stable postoperative course.  No fevers, chills, N/V, SOB.  No significant bleeding.  Back to regular activity levels.  No dietary restrictions.     PMHx: Reviewed, see previous note for full details.  No hx of significant cardiopulmonary health disorders or bleeding dyscrasias.   PSHx: Reviewed, see previous note for full details.  No hx of problems with GETA or complications from surgery.    Meds:  reviewed, see medication reconciliation in [employer]       GENERAL:  Alert and oriented; normal affect and appearance.  Well developed and well nourished.  Conversant and cooperative with examination.     HEAD:  Normocephalic and atraumatic.     NEUROLOGIC:  Cranial nerves II-XII grossly intact.  Gait WNL.  Normal mood and affect.   EYES:  EOMI, PERRL.  No nystagmus or ptosis noted.   EARS:  Normal auricle.  Otoscopic exam showed normal EACs without purulence or inflammation.  Tympanic membranes clear and mobile without evidence of perforation, retraction or effusion.    NOSE:  No external deformity.   Nasal cavity clear with midline septum, healing  mucosa and turbinates.   Mucoid debris, packing and old blood suctioned from MMA L>R.  MMA patent.  No purulence/polyps.    OC/OP: Mucous membranes moist without masses, lesions or exudates.  Normal tongue, floor of mouth, teeth, gums, and lips.  Normal tonsils and posterior oropharyngeal walls.     NECK:  No visible or palpable neck masses.  No tenderness, edema, or crepitus.  Trachea midline.   LYMPHATIC:  No cervical lymphadenopathy   RESPIRATORY:  No stridor or hoarseness.  No increased work of breathing or retractions. Symmetric  chest elevation.   CV:  No clubbing or cyanosis   SKIN:  No facial rashes, vesicles or lesions.      Nasal Endoscopy           Nasal Endoscopy Indication:    Risks, benefits, and alternatives, infection risk, bleeding risk and risk of mucosal trauma and pain were discussed with the patient. Verbal consent was obtained prior to the procedure and is detailed in the patient's record.     Procedure Note:      With the patient seated in the exam chair, the bilateral nasal cavity was topically treated with 4% lidocaine and phenylephrine.  The bilateral nasal cavity was intubated with the  0 degree 4mm rigid endoscope.   Nasal Endoscopy: Nasal endoscopy was successfully completed using the endoscope and the nasal mucosa, septum, turbinates, and osteomeatal complex were examined.  The spheno-ethmoid recess was examined.          Patient Status: the patient tolerated the procedure well.   Complications: there were no complications.   Significant/abnormal findings: No significant abnormal findings during the above exam, except as listed in physical exam        A/P: Overall doing well without evidence of post-surgical complications.  Counseled on local wound care and expected postoperative healing course.    Plan for ENT f/u  in 1-2mo for debridement. .

## 2024-05-07 ENCOUNTER — LAB (OUTPATIENT)
Dept: LAB | Facility: LAB | Age: 29
End: 2024-05-07
Payer: COMMERCIAL

## 2024-05-07 ENCOUNTER — HOSPITAL ENCOUNTER (OUTPATIENT)
Dept: RADIOLOGY | Facility: CLINIC | Age: 29
Discharge: HOME | End: 2024-05-07
Payer: COMMERCIAL

## 2024-05-07 ENCOUNTER — OFFICE VISIT (OUTPATIENT)
Dept: PULMONOLOGY | Facility: CLINIC | Age: 29
End: 2024-05-07
Payer: COMMERCIAL

## 2024-05-07 VITALS
WEIGHT: 205 LBS | TEMPERATURE: 97.9 F | HEIGHT: 68 IN | RESPIRATION RATE: 18 BRPM | OXYGEN SATURATION: 97 % | HEART RATE: 76 BPM | BODY MASS INDEX: 31.07 KG/M2 | SYSTOLIC BLOOD PRESSURE: 132 MMHG | DIASTOLIC BLOOD PRESSURE: 84 MMHG

## 2024-05-07 DIAGNOSIS — T59.91XD: ICD-10-CM

## 2024-05-07 DIAGNOSIS — T59.91XD: Primary | ICD-10-CM

## 2024-05-07 DIAGNOSIS — R05.8 COUGH ON EXERCISE: ICD-10-CM

## 2024-05-07 PROBLEM — T59.91XA: Status: ACTIVE | Noted: 2024-05-07

## 2024-05-07 PROCEDURE — 36415 COLL VENOUS BLD VENIPUNCTURE: CPT

## 2024-05-07 PROCEDURE — 82785 ASSAY OF IGE: CPT

## 2024-05-07 PROCEDURE — 71046 X-RAY EXAM CHEST 2 VIEWS: CPT

## 2024-05-07 PROCEDURE — 86003 ALLG SPEC IGE CRUDE XTRC EA: CPT

## 2024-05-07 PROCEDURE — 99214 OFFICE O/P EST MOD 30 MIN: CPT | Performed by: INTERNAL MEDICINE

## 2024-05-07 PROCEDURE — 71046 X-RAY EXAM CHEST 2 VIEWS: CPT | Performed by: RADIOLOGY

## 2024-05-07 RX ORDER — ALBUTEROL SULFATE 90 UG/1
2 AEROSOL, METERED RESPIRATORY (INHALATION) EVERY 4 HOURS PRN
Qty: 8 G | Refills: 5 | Status: SHIPPED | OUTPATIENT
Start: 2024-05-07 | End: 2024-11-03

## 2024-05-07 ASSESSMENT — ENCOUNTER SYMPTOMS
SHORTNESS OF BREATH: 1
WHEEZING: 1
COUGH: 1

## 2024-05-07 ASSESSMENT — PAIN SCALES - GENERAL: PAINLEVEL: 0-NO PAIN

## 2024-05-07 NOTE — PROGRESS NOTES
Department of Medicine  Division of Pulmonary, Critical Care, and Sleep Medicine  Location  [facility], [address]    I was asked to evaluate Dany Mayorga for cough. I have independently interviewed and examined the patient in the office and reviewed available records.     Physician HPI (5/7/2024):  28 y.o. year-old male who had hydrogen sulfide exposure in March 2023. 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  He states that he was [place] a facility that was cutting corners.  His coworkers hydrogen sulfide gas alarm started going off, and then shortly afterwards the patient felt as if the knife had pierced his heart.  He looked himself in the mirror and noticed that his lips were blue.  He immediately left the scene and went to a local hospital.  Chest x-ray at that time was normal.  He was able to drive home to [place] from [place], however, a day later he developed a cough.  He had a repeat chest x-ray after coming back home which was also normal.  He had a persistent cough for 6 weeks after exposure which appears to have resolved. Prior to H2S gas exposure, he denied any difficulties with exercising or having any coughing/wheezing.    Lately, however, patient notices that when he is performing strenuous activity or attending to exercise, he develops wheezing and coughing which subsides after approximately 1 hour.  He states that he was diagnosed with asthma as a child and believes he had spirometry done at one point.  He appears to have been on Advair at some time but has not been on this for over 10 years.  He is a lifetime non-smoker.  His father is asthmatic.  He suffers from seasonal allergies.  He is also here for a Worker's Compensation evaluation.    He had nasal septum repair with excision of nasal turbinates on 4/9/2024, and right ulnar nerve release surgery on 4/22/2024.      PMH:  Past Medical History:   Diagnosis Date    Anxiety      Cervical disc disorder 10/29/2020    Chronic ethmoidal sinusitis     Chronic maxillary sinusitis     Chronic pain disorder 10/30/2020    Class 1 obesity with body mass index (BMI) of 32.0 to 32.9 in adult 04/02/2024    Depression 2009    Deviated septum     Extremity pain 10/29/2020    HTN (hypertension)     Migraine 2014    Nasal congestion     Nasal turbinate hypertrophy     Neck pain 10/29/2020    Personal history of other specified conditions 12/08/2021    History of fever    Right lower quadrant pain 12/08/2021    Abdominal pain, acute, right lower quadrant       PSH:  Past Surgical History:   Procedure Laterality Date    COLONOSCOPY      EPIDURAL BLOCK INJECTION  July 2023    NASAL SEPTOPLASTY W/ TURBINOPLASTY Bilateral 04/09/2024    SINUSOTOMY Bilateral 04/09/2024    WISDOM TOOTH EXTRACTION         FHx:  Family History   Problem Relation Name Age of Onset    Hypertension Mother Nora Salmeronrafiq     Graves' disease Mother Brandonbryan Mukesh     Depression Mother Nora Salmeronrafiq     Paranoid behavior Father Power Salmeronrafiq     Anxiety disorder Father Power Salmeronrafiq     Migraines Father Power Mukesh     Other (hld) Father Power Mukesh     Hyperlipidemia Father Power Salmeronrafiq     Hypertension Father Power Salmeronrafiq     Mental illness Father Power Salmeronrafiq     Asthma Father Power Salmeronrafiq     Migraines Sister Sonia     Thyroid disease Sister Sonia     Depression Sister Sonia     Migraines Brother      Asperger's syndrome Brother      Depression Brother Shelton     Hypertension Brother Shelton     Mental illness Brother Shelton     Migraines Brother Shelton     GI problems Father's Brother Allen Salmeronrafiq     Arthritis Maternal Grandmother Hellen Melissa     Cancer Maternal Grandmother Hellen Melissa     Depression Maternal Grandmother Hellen Melissa     Diabetes Maternal Grandmother Hellen Melissa     Thyroid disease Maternal Grandfather Jeremiah Melissa     Other (bca) Maternal Grandfather Jeremiah Melissa     Diabetes type II Maternal  Grandfather Jeremiah Melissa     Muscular dystrophy Maternal Grandfather Jeremiah Melissa     Cancer Maternal Grandfather Jeremiah Melissa     Diabetes Maternal Grandfather Jeremiah Melissa     Muscular dystrophy Paternal Grandfather Doc Mukesh     Melanoma Paternal Grandfather Doc Mukesh     Early natural death Paternal Grandfather Doc Mukesh     GI problems Paternal Grandfather Doc Mukesh        Social Hx:  Social History     Socioeconomic History    Marital status: Single     Spouse name: Not on file    Number of children: Not on file    Years of education: Not on file    Highest education level: Not on file   Occupational History    Not on file   Tobacco Use    Smoking status: Never     Passive exposure: Never    Smokeless tobacco: Never   Vaping Use    Vaping status: Never Used   Substance and Sexual Activity    Alcohol use: Yes     Alcohol/week: 1.0 - 2.0 standard drink of alcohol     Types: 1 - 2 Cans of beer per week    Drug use: Never    Sexual activity: Not Currently     Partners: Female     Birth control/protection: Condom Male   Other Topics Concern    Not on file   Social History Narrative    Not on file     Social Determinants of Health     Financial Resource Strain: Not on file   Food Insecurity: Not on file   Transportation Needs: Not on file   Physical Activity: Not on file   Stress: Not on file   Social Connections: Not on file   Intimate Partner Violence: Not At Risk (2/8/2024)    Received from [facility]    Humiliation, Afraid, Rape, and Kick questionnaire     Fear of Current or Ex-Partner: No     Emotionally Abused: No     Physically Abused: No     Sexually Abused: No   Housing Stability: Not on file       Immunization History:  Immunization History   Administered Date(s) Administered    HPV, Quadrivalent 08/20/2013, 10/21/2013, 02/19/2014    Hepatitis A vaccine, pediatric/adolescent (HAVRIX, VAQTA) 08/20/2013, 02/19/2014    Meningococcal ACWY vaccine (MENVEO) 08/20/2013    Pfizer Purple Cap  "SARS-CoV-2 04/06/2021    Tdap vaccine, age 7 year and older (BOOSTRIX, ADACEL) 08/20/2013       Current Medications:  Current Outpatient Medications   Medication Instructions    celecoxib (CELEBREX) 200 mg, oral, 2 times daily    docusate sodium (COLACE) 100 mg, oral, 2 times daily    fluticasone (Flonase) 50 mcg/actuation nasal spray 2 sprays, Each Nostril, Daily, Shake gently. Before first use, prime pump. After use, clean tip and replace cap.    lamoTRIgine (LAMICTAL) 25 mg, oral, Nightly    mupirocin (Bactroban) 2 % ointment Apply 1 Application topically 2 times a day for 14 days. After 2 weeks, transition to use of nasal saline gel.    oxymetazoline (Afrin) 0.05 % nasal spray 2 sprays, Each Nostril, Every 12 hours PRN, Also use for bothersome nasal bleeding.  Do not use for more than 3 days.    sodium chloride (Ocean) 0.65 % nasal spray 1 spray, Each Nostril, As needed, Use frequently throughout the day to keep nasal cavity moist and to help clear debris (mucous, clot, etc.)    verapamil SR (CALAN-SR) 240 mg, oral, Nightly        Drug Allergies/Intolerances:  Allergies   Allergen Reactions    Other Other     Seasonal    Sulfa (Sulfonamide Antibiotics) Other        Review of Systems:  Review of Systems   Respiratory:  Positive for cough, shortness of breath and wheezing.    Cardiovascular:  Negative for chest pain and leg swelling.        All other review of systems are negative and/or non-contributory.    Physical Examination:  Vitals:    05/07/24 1108   BP: 132/84   BP Location: Left arm   Patient Position: Sitting   Pulse: 76   Resp: 18   Temp: 36.6 °C (97.9 °F)   TempSrc: Temporal   SpO2: 97%   Weight: 93 kg (205 lb)   Height: 1.727 m (5' 8\")        GEN: pleasant man without respiratory difficulties  EYES: wears corrective lenses  ENT: Mallampati I,   CV: RRR, no m/g/r  LUNGS: good effort, clear bilaterally, no w/r/r  ABD: Soft, nontender  EXT: healing surgical scar on right upper extremity with scattered " "echymosis  NEURO: strength equal bilaterally, sensation grossly intact        Pulmonary Function Test Results     None    Exacerbation History     None    Chest Radiograph     XR chest 2 view  TH CHEST 2 VIEW PA AND LAT;  3/13/2023 1:04 pm    Impression  No acute cardiopulmonary disease.    3/11/2023 at outside facility:  \"No acute pathology or active infiltrates are noted\"    Chest CT Scan     None       Bronchoscopy     None    Labs     Lab Results   Component Value Date    WBC 9.3 04/19/2024    HGB 17.1 04/19/2024    HCT 52.7 (H) 04/19/2024    MCV 95 04/19/2024     04/19/2024     No results found for: \"BNP\"  Lab Results   Component Value Date    EOSABS 0.19 03/13/2023     Eosinophil 200 December 2023    Echocardiogram     No results found for this or any previous visit from the past 365 days.       ASSESSMENT & PLAN     Problem List Items Addressed This Visit       Cough on exercise    Toxic effect of gas exposure - Primary    Relevant Medications    albuterol (ProAir HFA) 90 mcg/actuation inhaler    Other Relevant Orders    Complete Pulmonary Function Test Pre/Post Bronchodialator (Spirometry Pre/Post/DLCO/Lung Volumes)    XR chest 2 views    Respiratory Allergy Profile IgE        Summary:  28 y.o. year-old male with history of childhood asthma who suffered hydrogen sulfide gas exposure in March 2023. He now has cough and wheeze associated with exertion. While it is unlikely that hydrogen sulfide gas exposure from over a year ago is having lingering effects, it is still a possibility that this toxic gas could have unmasked some reactive airways disease symptoms or cough-variant asthma.    Plan:  -Trial of albuterol 20 minutes prior to exercise.   -PFT +/- methacholine provocation   -PA/Lat CXR - if any concerning findings will follow up with dedicated CT chest  -Respiratory Allergy Profile with IgE    Follow-up: 6/18/2024    Farzana Carlin DO  Staff Physician - Pulmonary & Critical Care  05/07/24 11:04 " AM  Office number: [phone]   Fax number:  [phone]

## 2024-05-09 LAB
A ALTERNATA IGE QN: 1.61 KU/L
A FUMIGATUS IGE QN: <0.1 KU/L
BERMUDA GRASS IGE QN: <0.1 KU/L
BOXELDER IGE QN: <0.1 KU/L
C HERBARUM IGE QN: <0.1 KU/L
CALIF WALNUT POLN IGE QN: <0.1 KU/L
CAT DANDER IGE QN: <0.1 KU/L
CMN PIGWEED IGE QN: ABNORMAL
COMMON RAGWEED IGE QN: ABNORMAL
COTTONWOOD IGE QN: <0.1 KU/L
D FARINAE IGE QN: <0.1 KU/L
D PTERONYSS IGE QN: <0.1 KU/L
DOG DANDER IGE QN: <0.1 KU/L
ENGL PLANTAIN IGE QN: ABNORMAL
GOOSEFOOT IGE QN: ABNORMAL
JOHNSON GRASS IGE QN: <0.1 KU/L
KENT BLUE GRASS IGE QN: <0.1 KU/L
LONDON PLANE IGE QN: <0.1 KU/L
MT JUNIPER IGE QN: ABNORMAL
P NOTATUM IGE QN: <0.1 KU/L
PECAN/HICK TREE IGE QN: <0.1 KU/L
ROACH IGE QN: <0.1 KU/L
SALTWORT IGE QN: ABNORMAL
SHEEP SORREL IGE QN: ABNORMAL
SILVER BIRCH IGE QN: ABNORMAL
TIMOTHY IGE QN: <0.1 KU/L
TOTAL IGE SMQN RAST: 20.6 KU/L
WHITE ASH IGE QN: <0.1 KU/L
WHITE ELM IGE QN: ABNORMAL
WHITE MULBERRY IGE QN: ABNORMAL
WHITE OAK IGE QN: ABNORMAL

## 2024-05-10 ENCOUNTER — APPOINTMENT (OUTPATIENT)
Dept: PRIMARY CARE | Facility: CLINIC | Age: 29
End: 2024-05-10
Payer: COMMERCIAL

## 2024-05-13 ENCOUNTER — APPOINTMENT (OUTPATIENT)
Dept: RESPIRATORY THERAPY | Facility: CLINIC | Age: 29
End: 2024-05-13
Payer: COMMERCIAL

## 2024-05-15 ENCOUNTER — HOSPITAL ENCOUNTER (OUTPATIENT)
Dept: RESPIRATORY THERAPY | Facility: CLINIC | Age: 29
Discharge: HOME | End: 2024-05-15
Payer: COMMERCIAL

## 2024-05-15 DIAGNOSIS — T59.91XD: ICD-10-CM

## 2024-05-15 PROCEDURE — 94729 DIFFUSING CAPACITY: CPT | Performed by: INTERNAL MEDICINE

## 2024-05-15 PROCEDURE — 94010 BREATHING CAPACITY TEST: CPT | Performed by: INTERNAL MEDICINE

## 2024-05-15 PROCEDURE — 94010 BREATHING CAPACITY TEST: CPT

## 2024-05-15 PROCEDURE — 94727 GAS DIL/WSHOT DETER LNG VOL: CPT | Performed by: INTERNAL MEDICINE

## 2024-05-15 PROCEDURE — 94727 GAS DIL/WSHOT DETER LNG VOL: CPT

## 2024-05-15 PROCEDURE — 94729 DIFFUSING CAPACITY: CPT

## 2024-05-17 ENCOUNTER — LAB (OUTPATIENT)
Dept: LAB | Facility: LAB | Age: 29
End: 2024-05-17
Payer: COMMERCIAL

## 2024-05-17 DIAGNOSIS — R05.8 COUGH ON EXERCISE: Primary | ICD-10-CM

## 2024-05-17 DIAGNOSIS — R05.8 COUGH ON EXERCISE: ICD-10-CM

## 2024-05-17 PROCEDURE — 82785 ASSAY OF IGE: CPT

## 2024-05-17 PROCEDURE — 36415 COLL VENOUS BLD VENIPUNCTURE: CPT

## 2024-05-17 PROCEDURE — 86003 ALLG SPEC IGE CRUDE XTRC EA: CPT

## 2024-05-17 NOTE — PROGRESS NOTES
Orders Placed This Encounter   Procedures    Respiratory Allergy Profile IgE     Standing Status:   Future     Standing Expiration Date:   5/17/2025     Order Specific Question:   Release result to [facility]     Answer:   Immediate [1]

## 2024-05-18 LAB
A ALTERNATA IGE QN: 1.25 KU/L
A FUMIGATUS IGE QN: <0.1 KU/L
BERMUDA GRASS IGE QN: <0.1 KU/L
BOXELDER IGE QN: <0.1 KU/L
C HERBARUM IGE QN: <0.1 KU/L
CALIF WALNUT POLN IGE QN: <0.1 KU/L
CAT DANDER IGE QN: <0.1 KU/L
CMN PIGWEED IGE QN: <0.1 KU/L
COMMON RAGWEED IGE QN: 0.27 KU/L
COTTONWOOD IGE QN: <0.1 KU/L
D FARINAE IGE QN: <0.1 KU/L
D PTERONYSS IGE QN: <0.1 KU/L
DOG DANDER IGE QN: <0.1 KU/L
ENGL PLANTAIN IGE QN: 0.27 KU/L
GOOSEFOOT IGE QN: <0.1 KU/L
JOHNSON GRASS IGE QN: <0.1 KU/L
KENT BLUE GRASS IGE QN: <0.1 KU/L
LONDON PLANE IGE QN: <0.1 KU/L
MT JUNIPER IGE QN: <0.1 KU/L
P NOTATUM IGE QN: <0.1 KU/L
PECAN/HICK TREE IGE QN: <0.1 KU/L
ROACH IGE QN: <0.1 KU/L
SALTWORT IGE QN: <0.1 KU/L
SHEEP SORREL IGE QN: <0.1 KU/L
SILVER BIRCH IGE QN: <0.1 KU/L
TIMOTHY IGE QN: <0.1 KU/L
TOTAL IGE SMQN RAST: 18.1 KU/L
WHITE ASH IGE QN: <0.1 KU/L
WHITE ELM IGE QN: <0.1 KU/L
WHITE MULBERRY IGE QN: <0.1 KU/L
WHITE OAK IGE QN: <0.1 KU/L

## 2024-05-29 ENCOUNTER — OFFICE VISIT (OUTPATIENT)
Dept: OTOLARYNGOLOGY | Facility: CLINIC | Age: 29
End: 2024-05-29
Payer: COMMERCIAL

## 2024-05-29 VITALS — WEIGHT: 205 LBS | BODY MASS INDEX: 31.17 KG/M2

## 2024-05-29 DIAGNOSIS — J32.0 CHRONIC MAXILLARY SINUSITIS: Primary | ICD-10-CM

## 2024-05-29 PROCEDURE — 99024 POSTOP FOLLOW-UP VISIT: CPT | Performed by: GENERAL PRACTICE

## 2024-05-29 PROCEDURE — 1036F TOBACCO NON-USER: CPT | Performed by: GENERAL PRACTICE

## 2024-05-29 PROCEDURE — 3008F BODY MASS INDEX DOCD: CPT | Performed by: GENERAL PRACTICE

## 2024-05-29 RX ORDER — LAMOTRIGINE 100 MG/1
1 TABLET ORAL NIGHTLY
COMMUNITY
Start: 2024-05-14

## 2024-05-29 ASSESSMENT — PATIENT HEALTH QUESTIONNAIRE - PHQ9
SUM OF ALL RESPONSES TO PHQ9 QUESTIONS 1 AND 2: 0
2. FEELING DOWN, DEPRESSED OR HOPELESS: NOT AT ALL
1. LITTLE INTEREST OR PLEASURE IN DOING THINGS: NOT AT ALL

## 2024-05-29 NOTE — PROGRESS NOTES
HPI:      28yoM s/p septoFESS presents for follow up.    Reports good nasal airflow and no sinus pain/pressure  Mild baseline congetion.    No purulent rhinorrhea  Decreasing need to complete   irrigations.      recall     The pt presents for postoperative evaluation s/p septoFESS (b/l MMA, andres takedown)  on DOS 4/9/24.    No acute complaints currently.  Stable postoperative course.  No fevers, chills, N/V, SOB.  No significant bleeding.  Back to regular activity levels.  No dietary restrictions.     PMHx: Reviewed, see previous note for full details.  No hx of significant cardiopulmonary health disorders or bleeding dyscrasias.   PSHx: Reviewed, see previous note for full details.  No hx of problems with GETA or complications from surgery.    Meds:  reviewed, see medication reconciliation in [employer]       GENERAL:  Alert and oriented; normal affect and appearance.  Well developed and well nourished.  Conversant and cooperative with examination.     HEAD:  Normocephalic and atraumatic.     NEUROLOGIC:  Cranial nerves II-XII grossly intact.  Gait WNL.  Normal mood and affect.   EYES:  EOMI, PERRL.  No nystagmus or ptosis noted.   EARS:  Normal auricle.  Otoscopic exam showed normal EACs without purulence or inflammation.  Tympanic membranes clear and mobile without evidence of perforation, retraction or effusion.    NOSE:  No external deformity.   Nasal cavity clear with midline septum, normal   mucosa and turbinates.    No purulence/polyps.    OC/OP: Mucous membranes moist without masses, lesions or exudates.  Normal tongue, floor of mouth, teeth, gums, and lips.  Normal tonsils and posterior oropharyngeal walls.     NECK:  No visible or palpable neck masses.  No tenderness, edema, or crepitus.  Trachea midline.   LYMPHATIC:  No cervical lymphadenopathy   RESPIRATORY:  No stridor or hoarseness.  No increased work of breathing or retractions. Symmetric chest elevation.   CV:  No clubbing or cyanosis   SKIN:  No  facial rashes, vesicles or lesions.               A/P: Overall doing well without evidence of post-surgical complications.  Counseled on local wound care and expected postoperative healing course.  Discussed controls for rhinitis.  Discussed endoscopy vs deferment today and as pt is doing well, pt agrees to hold off for now.   Plan for ENT f/u  in 3mo or sooner for concerns. . .

## 2024-05-30 DIAGNOSIS — I10 ESSENTIAL HYPERTENSION: ICD-10-CM

## 2024-05-30 RX ORDER — VERAPAMIL HYDROCHLORIDE 240 MG/1
240 TABLET, FILM COATED, EXTENDED RELEASE ORAL NIGHTLY
Qty: 90 TABLET | Refills: 0 | Status: SHIPPED | OUTPATIENT
Start: 2024-05-30 | End: 2024-11-26

## 2024-06-18 ENCOUNTER — OFFICE VISIT (OUTPATIENT)
Dept: PULMONOLOGY | Facility: CLINIC | Age: 29
End: 2024-06-18
Payer: COMMERCIAL

## 2024-06-18 VITALS
WEIGHT: 210 LBS | HEIGHT: 68 IN | SYSTOLIC BLOOD PRESSURE: 116 MMHG | TEMPERATURE: 97.2 F | OXYGEN SATURATION: 98 % | BODY MASS INDEX: 31.83 KG/M2 | RESPIRATION RATE: 18 BRPM | HEART RATE: 80 BPM | DIASTOLIC BLOOD PRESSURE: 71 MMHG

## 2024-06-18 DIAGNOSIS — J30.1 ALLERGIC RHINITIS DUE TO POLLEN, UNSPECIFIED SEASONALITY: ICD-10-CM

## 2024-06-18 DIAGNOSIS — T59.91XD: ICD-10-CM

## 2024-06-18 DIAGNOSIS — R05.8 COUGH ON EXERCISE: Primary | ICD-10-CM

## 2024-06-18 PROCEDURE — 1036F TOBACCO NON-USER: CPT | Performed by: INTERNAL MEDICINE

## 2024-06-18 PROCEDURE — 99214 OFFICE O/P EST MOD 30 MIN: CPT | Performed by: INTERNAL MEDICINE

## 2024-06-18 PROCEDURE — 3008F BODY MASS INDEX DOCD: CPT | Performed by: INTERNAL MEDICINE

## 2024-06-18 PROCEDURE — 3078F DIAST BP <80 MM HG: CPT | Performed by: INTERNAL MEDICINE

## 2024-06-18 PROCEDURE — 3074F SYST BP LT 130 MM HG: CPT | Performed by: INTERNAL MEDICINE

## 2024-06-18 ASSESSMENT — ENCOUNTER SYMPTOMS
COUGH: 1
WHEEZING: 1

## 2024-06-18 ASSESSMENT — PAIN SCALES - GENERAL: PAINLEVEL: 0-NO PAIN

## 2024-06-18 NOTE — PROGRESS NOTES
Department of Medicine  Division of Pulmonary, Critical Care, and Sleep Medicine  Follow Up  [facility], [address]    Dany Mayorga returns as a follow up for cough & wheeze. Last visit was on 5/7/2024.    Physician HPI (6/18/2024):  29 y.o. male who had hydrogen sulfide exposure in March 2023. 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  He states that he was [place] a facility that was cutting corners.  His coworker's hydrogen sulfide gas alarm started going off, and then shortly afterwards the patient felt as if a knife had pierced his heart.  He looked at himself in the mirror and noticed that his lips were blue.  He immediately left the scene and went to a local hospital.  Chest x-ray at that time was normal.  He was able to drive home to [place] from [place], however, a day later he developed a cough.  He had a repeat chest x-ray after coming back home which was also normal.  He had a persistent cough for 6 weeks after exposure which appears to have resolved. Prior to H2S gas exposure, he denied any difficulties with exercising or having any coughing/wheezing.     Lately, however, patient notices that when he is performing strenuous activity or attending to exercise, he develops wheezing and coughing which subsides after approximately 1 hour.  He states that he was diagnosed with asthma as a child and believes he had spirometry done at one point.  He appears to have been on Advair at some time but has not been on this for over 10 years.  He is a lifetime non-smoker.  His father is asthmatic.  He suffers from seasonal allergies.  He is also here for a Worker's Compensation evaluation.     He had nasal septum repair with excision of nasal turbinates on 4/9/2024, and right ulnar nerve release surgery on 4/22/2024.      At last visit we ordered PFT which ruled out obstruction, but did demonstrate mild restriction. PA/Lat CXR was read as normal, but did  appear to have possible granulomas at the bases. Allergen profile was mildly positive for Alternaria, ragweed, English plantain. He states that even after using pre-exercise albuterol, he wheezes for 20 minutes after completing exercise. He has exercised approximately 7 times since last visit. He has gone back to work as well. His main concern is whether or not he will have long-term damage to his lungs 20 years from now or not.    I have personally reviewed PMH, PSH, Family History in the HISTORY tab of this chart, and unless noted above are not pertinent to the presenting complaint.  I have personally reviewed Social History as provided in the electronic medical record.    Immunization History:  Immunization History   Administered Date(s) Administered    HPV, Quadrivalent 08/20/2013, 10/21/2013, 02/19/2014    Hepatitis A vaccine, pediatric/adolescent (HAVRIX, VAQTA) 08/20/2013, 02/19/2014    Meningococcal ACWY vaccine (MENVEO) 08/20/2013    Pfizer Purple Cap SARS-CoV-2 04/06/2021    Tdap vaccine, age 7 year and older (BOOSTRIX, ADACEL) 08/20/2013       Current Medications:  Current Outpatient Medications   Medication Instructions    albuterol (ProAir HFA) 90 mcg/actuation inhaler 2 puffs, inhalation, Every 4 hours PRN    fluticasone (Flonase) 50 mcg/actuation nasal spray 2 sprays, Each Nostril, Daily, Shake gently. Before first use, prime pump. After use, clean tip and replace cap.    lamoTRIgine (LaMICtal) 100 mg tablet 1 tablet, oral, Nightly    lamoTRIgine (LAMICTAL) 100 mg, oral, Nightly    oxymetazoline (Afrin) 0.05 % nasal spray 2 sprays, Each Nostril, Every 12 hours PRN, Also use for bothersome nasal bleeding.  Do not use for more than 3 days.    sodium chloride (Ocean) 0.65 % nasal spray 1 spray, Each Nostril, As needed, Use frequently throughout the day to keep nasal cavity moist and to help clear debris (mucous, clot, etc.)    verapamil SR (CALAN-SR) 240 mg, oral, Nightly        Drug  "Allergies/Intolerances:  Allergies   Allergen Reactions    Other Other     Seasonal    Sulfa (Sulfonamide Antibiotics) Other        Review of Systems:  Review of Systems   Respiratory:  Positive for cough and wheezing.         All other review of systems are negative and/or non-contributory.    Physical Examination:  Vitals:    24 0925   BP: 116/71   BP Location: Right arm   Patient Position: Sitting   Pulse: 80   Resp: 18   Temp: 36.2 °C (97.2 °F)   TempSrc: Temporal   SpO2: 98%   Weight: 95.3 kg (210 lb)   Height: 1.727 m (5' 8\")          GEN: appears well  CV: RRR, no m/g/r  LUNGS: good effort, clear bilaterally, no w/r/r  EXT: no edema, cyanosis, clubbing      Exacerbation History     None    Pulmonary Function Test Results     5/15/2024:  FVC: 4.22 L (88%)  FEV1: 3.39 L (84%)  FEV1/FVC: 80  KUW26-48%: WNL  T.50 L (70%)  DLCO: 95%  PEF: 5.68    O2 Requirements     6MWT: none      Chest Radiograph     2024: reviewed independently by me. Questionable bibasilar granulomas vs blood vessels    3/13/2023: no evidence of disease      Chest CT Scan     None    Labs     Lab Results   Component Value Date    WBC 9.3 2024    HGB 17.1 2024    HCT 52.7 (H) 2024    MCV 95 2024     2024     No results found for: \"BNP\"  Lab Results   Component Value Date    EOSABS 0.19 2023        Latest Reference Range & Units Most Recent   Alternaria Alternata IgE <0.10 kU/L 1.25 !  24 12:38   Common Ragweed IgE <0.10 kU/L 0.27 !  24 12:38   English Plantain IgE <0.10 kU/L 0.27 !  24 12:38   Immunocap IgE <=214 KU/L 18.1  24 12:38   !: Data is abnormal      Echocardiogram     No results found for this or any previous visit from the past 365 days.       ASSESSMENT & PLAN     Problem List Items Addressed This Visit       Allergic rhinitis    Cough on exercise - Primary    Relevant Orders    Methacholine Challenge (Bronchial Provocation)    CT chest wo IV contrast    " Exhaled Nitric Oxide (FeNO)    Toxic effect of gas exposure    Relevant Orders    CT chest wo IV contrast        SUMMARY:  29 y.o. male with history of childhood asthma who suffered hydrogen sulfide gas exposure in March 2023. He now has cough and wheeze associated with exertion. PFT has ruled out obstruction, but does demonstrate mild restrictive ventilatory defect. Allergen testing is normal. He is still symptomatic from pulmonary standpoint. CXR with questionable granulomas at bases bilaterally. Restrictive ventilatory defect, persistent wheezing, and possible abnormal chest x-ray warrants more detailed imaging with CT chest. We will also proceed with methacholine provocation study to evaluate possibility of cough-variant asthma as well as FeNO.    Plan:  -CT chest  -methacholine provocation  -FeNO  -continue PRN albuterol    Follow-up: 8/5/2024        Farzana Carlin DO  Staff Physician - Pulmonary & Critical Care  06/18/24 9:33 AM  Office number: [phone]   Fax number:  [phone]

## 2024-06-24 ENCOUNTER — HOSPITAL ENCOUNTER (OUTPATIENT)
Dept: RADIOLOGY | Facility: CLINIC | Age: 29
Discharge: HOME | End: 2024-06-24
Payer: COMMERCIAL

## 2024-06-24 DIAGNOSIS — M79.672 LEFT FOOT PAIN: ICD-10-CM

## 2024-06-24 PROCEDURE — 73630 X-RAY EXAM OF FOOT: CPT | Mod: LT

## 2024-06-24 PROCEDURE — 73630 X-RAY EXAM OF FOOT: CPT | Mod: LEFT SIDE | Performed by: RADIOLOGY

## 2024-06-26 ENCOUNTER — HOSPITAL ENCOUNTER (OUTPATIENT)
Dept: RESPIRATORY THERAPY | Facility: CLINIC | Age: 29
Discharge: HOME | End: 2024-06-26
Payer: COMMERCIAL

## 2024-06-26 DIAGNOSIS — R05.8 COUGH ON EXERCISE: ICD-10-CM

## 2024-06-26 PROCEDURE — 94070 EVALUATION OF WHEEZING: CPT

## 2024-06-26 PROCEDURE — 95012 NITRIC OXIDE EXP GAS DETER: CPT

## 2024-06-28 ENCOUNTER — APPOINTMENT (OUTPATIENT)
Dept: RESPIRATORY THERAPY | Facility: HOSPITAL | Age: 29
End: 2024-06-28
Payer: COMMERCIAL

## 2024-07-01 ENCOUNTER — OFFICE VISIT (OUTPATIENT)
Dept: ORTHOPEDIC SURGERY | Facility: HOSPITAL | Age: 29
End: 2024-07-01
Payer: COMMERCIAL

## 2024-07-01 DIAGNOSIS — M84.375A METATARSAL STRESS FRACTURE OF LEFT FOOT, INITIAL ENCOUNTER: Primary | ICD-10-CM

## 2024-07-01 PROCEDURE — 99214 OFFICE O/P EST MOD 30 MIN: CPT | Performed by: PHYSICIAN ASSISTANT

## 2024-07-01 PROCEDURE — 3008F BODY MASS INDEX DOCD: CPT | Performed by: PHYSICIAN ASSISTANT

## 2024-07-01 PROCEDURE — L4361 PNEUMA/VAC WALK BOOT PRE OTS: HCPCS | Performed by: PHYSICIAN ASSISTANT

## 2024-07-01 ASSESSMENT — PAIN - FUNCTIONAL ASSESSMENT: PAIN_FUNCTIONAL_ASSESSMENT: 0-10

## 2024-07-01 ASSESSMENT — PAIN SCALES - GENERAL: PAINLEVEL_OUTOF10: 5 - MODERATE PAIN

## 2024-07-01 ASSESSMENT — PAIN DESCRIPTION - DESCRIPTORS: DESCRIPTORS: DULL;SHARP

## 2024-07-01 NOTE — PATIENT INSTRUCTIONS
YOUR BOOT INSTRUCTIONS:  You can put weight on your foot and ankle while in the boot.    You can use crutches or walker for support as needed.   You should wear boot when walking or standing for 3 weeks.  You can take off your boot while bathing, sitting, stretching, icing or doing therapy exercises.  You can take your boot off at nighttime while sleeping.    BOOT WEANING:  DAY 1-- come out of boot for 1 hour (wear supportive athletic shoes and orthotic inserts), rest of the day in boot  DAY 2-- come out of boot for 2 hours (wear supportive athletic shoes and orthotic inserts), rest of the day in boot  DAY 3-- come out of boot for 3 hours (wear supportive athletic shoes and orthotic inserts), rest of the day in boot  DAY 4-- come out of boot for 4 hours (wear supportive athletic shoes and orthotic inserts), rest of the day in boot  DAY 5-- come out of boot and wear supportive shoes and orthotic inserts  * if you have pain while weaning out of boot then go back one day in the protocol (i.e. If really sore on the morning of Day 3 from coming out of boot for 2 hours the previous day, go back to Day 1 and start again through the protocol)    You can also use OTC Voltaren gel or  aspercreme ointment and apply it to the injured area.      Ice and elevate supported at the calf to reduce swelling    Patient will avoid impact activities such as running or jumping.  They can use a stationary bike, pool exercises and upper body training.    Patient will increase their dietary calcium intake (milk,yogurt) and should get 1200 mg of calcium per day. They will also take a vitamin D supplement.    Running program recommendations/ guidelines  Due to injury, if you missed…    Missed < 1wk-no modification of preinjury training  Missed 1-2 wks-decrease 25% from preinjury mileage  Missed 2-3 wks-decrease 50% from preinjury mileage the first week, decrease 25% from preinjury mileage the second week    Missed over one month-start with  stationary bike and work up to 30 minutes 3 times a week, then start program below….  -- Week 1: walk 1-2 miles, alternating 1 min fast and 1 min normal pace  -- Week 2: walk 2-3 miles, alternating 1.5 min jog with 1.5 min walk  -- Week 3: if no pain then substitute 10 min jog every other day in lieu of walk/jog, incorporate rest days as needed  -- Week 4: if no pain then substitute 15 min jog every other day in lieu of walk/jog, incorporate rest days as needed  -- Week 5: jog 15 min and alternated with 25 min every other day, incorporate rest days as needed  -- Week 6: jog 20 min and alternate with 30 min every other day, incorporate rest days as needed  -- Week 7: jog 20 min and alternate with 35 min every other day, incorporate rest days as needed  -- Week 8: jog 20 min and alternate with 40 min every other day, incorporate rest days as needed  -- Week 9: resume usual training at preinjury level if pain-free and training errors have been corrected     Pointers:  -- Make sure that your training program incorporates weight training, flexibility, hip and core strengthening and nonimpact activities  -- Make sure that you get adequate nutrition in your diet, hydration and rest  -- Change out your running shoes every 200-300 miles  -- Avoid running on consecutive days; make sure to cross train    Follow up in 4-6 weeks or as needed

## 2024-07-01 NOTE — PROGRESS NOTES
NPV-   History of Present Illness  29 y.o.male here for left foot pain  1. Metatarsal stress fracture of left foot, initial encounter  Walking Boot Short      Mechanism of injury: none, running  Date of Injury/Pain: 1 week  Location of pain: lateral foot  Frequency of Pain: worse with walking or standing  Associated symptoms? Swelling.  Previous treatment?  none        27 point review of systems negative except what is stated in HPI    Constitutional Exam: patient's height and weight reviewed, well-kempt  Psychiatric Exam: alert and oriented x 3, appropriate mood and behavior  Eye Exam: HEIDY HOPSON  Pulmonary Exam: breathing non-labored, no apparent distress  Lymphatic exam: no appreciable lymphadenopathy in the lower extremities  Cardiovascular exam: DP pulses 2+ bilaterally, PT 2+ bilaterally, toes are pink with good capillary refill, no pitting edema  Skin exam: no open lesions, rashes, abrasions or ulcerations  Neurological exam: sensation to light touch intact in both lower extremities in peripheral and dermatomal distributions (except for any abnormalities noted in musculoskeletal exam)    On examination of the left ankle/foot:  antalgic gait  Normal pes palnsu alignment  Minimal swelling and no ecchymosis of the lateral foot. no erythema. Skin intact; no ulcers or lesions.   Normal ROM in  ankle plantarflexion, dorsiflexion, inversion and eversion; minimal Normal ROM in 2-5th toe flexion/extension and 1st MTP flexion/extension  Normal strength in ankle plantarflexion, dorsiflexion, inversion and eversion; normal strength with great toe flexion/extension. No pain with eversion  Tenderness to palpation: 5th metatarsal   No tenderness to palpation over the Achilles, medial and lateral malleolus, posterior tibial tendon, peroneal tendon, ATFL, deltoid ligament, talus or navicular.  no tenderness to palpation over heel, plantar arch, base of 1st metatarsal/2nd metatarsal, sesamoids, medial cuneiform, navicular,  1st MTP joint or 2nd or 3rd intermetarsal space.  Neurovascularly intact. Good capillary refill. No sensory deficit to light touch. Normal proprioception. Dorsalis pedis and posterior tibial pulses 2+ bilaterally.    - Romano's test                              - Dorsiflexion-eversion test  - Anterior Drawer test                      - resisted dorsiflexion-eversion test  - Talar tilt test                                    - shuck testing  - Squeeze test    I personally reviewed the patient's x-ray images and reports of the left foot. The xrays show no fractures or dislocation.  Normal joint spacing      ASSESSMENT: left foot 5th metatarsal stress fracture     PLAN: Treatment options were discussed with the patient. Patient was placed in a tall CAM walker boot to be WBAT with crutches.  They were given boot instructions. Patient will increase their dietary calcium intake (milk,yogurt) and should get 1200 mg of calcium per day. They will also take a vitamin D supplement. Patient was given a handout and instructed on an at home stretching program.  They should do these exercises 3 times per day for 6 weeks and then daily. Patient can use OTC Voltaren gel, biofreeze or  aspercream for pain and continue to ice and elevate supported at the calf to reduce swelling. Patient will avoid impact activities such as running or jumping.  They can use a stationary bike, pool exercises and upper body training. All the patient's questions were answered. The patient agrees with the above plan.  Follow up as needed    Patient was prescribed a CAM walker boot for metatarsal stress fracture.The patient is ambulatory with or without aid; but, has weakness, instability and/or deformity of their left ankle/foot which requires stabilization from this orthosis to improve their function.      Verbal and written instructions for the use, wear schedule, cleaning and application of this item were given.  Patient was instructed that should the  brace result in increased pain, decreased sensation, increased swelling, or an overall worsening of their medical condition, to please contact our office immediately.     Orthotic management and training was provided for skin care, modifications due to healing tissues, edema changes, interruption in skin integrity, and safety precautions with the orthosis.

## 2024-07-01 NOTE — LETTER
July 1, 2024     Patient: Dany Mayorga   YOB: 1995   Date of Visit: 7/1/2024       To Whom It May Concern:    It is my medical opinion that Dany Mayorga may return to light duty immediately with the following restrictions: wearing walking boot until an estimated date of 8/1/24 .    If you have any questions or concerns, please don't hesitate to call.         Sincerely,        Twin Posey PA-C    CC:   No Recipients

## 2024-07-01 NOTE — LETTER
July 1, 2024     Patient: Dany Mayorga   YOB: 1995   Date of Visit: 7/1/2024       To Whom It May Concern:    It is my medical opinion that Dany Mayorga may return to full duty immediately with no restrictions.    If you have any questions or concerns, please don't hesitate to call.         Sincerely,        Twin Posey PA-C    CC: No Recipients

## 2024-07-02 ENCOUNTER — HOSPITAL ENCOUNTER (OUTPATIENT)
Dept: RADIOLOGY | Facility: CLINIC | Age: 29
Discharge: HOME | End: 2024-07-02
Payer: COMMERCIAL

## 2024-07-02 DIAGNOSIS — T59.91XD: ICD-10-CM

## 2024-07-02 DIAGNOSIS — R05.8 COUGH ON EXERCISE: ICD-10-CM

## 2024-07-02 PROCEDURE — 71250 CT THORAX DX C-: CPT | Performed by: RADIOLOGY

## 2024-07-02 PROCEDURE — 71250 CT THORAX DX C-: CPT

## 2024-07-05 DIAGNOSIS — R91.8 LUNG NODULES: Primary | ICD-10-CM

## 2024-07-09 ENCOUNTER — APPOINTMENT (OUTPATIENT)
Dept: PRIMARY CARE | Facility: CLINIC | Age: 29
End: 2024-07-09
Payer: COMMERCIAL

## 2024-07-09 VITALS
BODY MASS INDEX: 32.04 KG/M2 | HEART RATE: 76 BPM | SYSTOLIC BLOOD PRESSURE: 108 MMHG | DIASTOLIC BLOOD PRESSURE: 70 MMHG | WEIGHT: 211.4 LBS | HEIGHT: 68 IN

## 2024-07-09 DIAGNOSIS — I51.7 CARDIOMEGALY: ICD-10-CM

## 2024-07-09 DIAGNOSIS — I10 ESSENTIAL HYPERTENSION: ICD-10-CM

## 2024-07-09 DIAGNOSIS — K29.70 GASTRITIS WITHOUT BLEEDING, UNSPECIFIED CHRONICITY, UNSPECIFIED GASTRITIS TYPE: Primary | ICD-10-CM

## 2024-07-09 PROCEDURE — 99214 OFFICE O/P EST MOD 30 MIN: CPT | Performed by: FAMILY MEDICINE

## 2024-07-09 PROCEDURE — 3078F DIAST BP <80 MM HG: CPT | Performed by: FAMILY MEDICINE

## 2024-07-09 PROCEDURE — 3008F BODY MASS INDEX DOCD: CPT | Performed by: FAMILY MEDICINE

## 2024-07-09 PROCEDURE — 3074F SYST BP LT 130 MM HG: CPT | Performed by: FAMILY MEDICINE

## 2024-07-09 RX ORDER — OMEPRAZOLE 40 MG/1
40 CAPSULE, DELAYED RELEASE ORAL
Qty: 90 CAPSULE | Refills: 0 | Status: SHIPPED | OUTPATIENT
Start: 2024-07-09 | End: 2024-10-07

## 2024-07-09 ASSESSMENT — ENCOUNTER SYMPTOMS
DEPRESSION: 0
LOSS OF SENSATION IN FEET: 0
OCCASIONAL FEELINGS OF UNSTEADINESS: 0

## 2024-07-10 PROBLEM — K29.70 GASTRITIS WITHOUT BLEEDING: Status: ACTIVE | Noted: 2024-07-10

## 2024-07-10 PROBLEM — I51.7 CARDIOMEGALY: Status: ACTIVE | Noted: 2024-07-10

## 2024-07-10 ASSESSMENT — ENCOUNTER SYMPTOMS
MUSCULOSKELETAL NEGATIVE: 1
CARDIOVASCULAR NEGATIVE: 1
ENDOCRINE NEGATIVE: 1
CHILLS: 0
ALLERGIC/IMMUNOLOGIC NEGATIVE: 1
COUGH: 1
VOMITING: 0
FEVER: 0
GASTROINTESTINAL NEGATIVE: 1
PSYCHIATRIC NEGATIVE: 1
NAUSEA: 0
NEUROLOGICAL NEGATIVE: 1
HEMATOLOGIC/LYMPHATIC NEGATIVE: 1

## 2024-07-10 NOTE — ASSESSMENT & PLAN NOTE
Continue verapamil blood pressure is well-controlledContinue verapamil blood pressure is well-controlled

## 2024-07-10 NOTE — PROGRESS NOTES
Subjective   Patient ID: Dany Mayorga is a 29 y.o. male who presents for Follow-up (For recent CT scan ).      Patient is here for follow-up had a CT scan done of his lungs to evaluate for cough CT scan showed thickening of the gastric wall denies abdominal pain or acid reflux but has had sinus issues and chronic cough had sinus surgery  CT scan of the lungs also showed cardiomegaly.  Denies edema or shortness of breath has chronic cough takes verapamil for headaches blood pressure controlled  CT lung showed multiple nodules has follow-up CT scheduled for 6 months      Current Outpatient Medications on File Prior to Visit   Medication Sig Dispense Refill    albuterol (ProAir HFA) 90 mcg/actuation inhaler Inhale 2 puffs every 4 hours if needed for wheezing or shortness of breath. 8 g 5    lamoTRIgine (LaMICtal) 100 mg tablet Take 1 tablet (100 mg) by mouth once daily at bedtime.      sodium chloride (Ocean) 0.65 % nasal spray Administer 1 spray into each nostril if needed for congestion. Use frequently throughout the day to keep nasal cavity moist and to help clear debris (mucous, clot, etc.) 30 mL 12    verapamil SR (Calan-SR) 240 mg ER tablet Take 1 tablet (240 mg) by mouth once daily at bedtime. 90 tablet 0    fluticasone (Flonase) 50 mcg/actuation nasal spray Administer 2 sprays into each nostril once daily. Shake gently. Before first use, prime pump. After use, clean tip and replace cap. 16 g 2    oxymetazoline (Afrin) 0.05 % nasal spray Administer 2 sprays into each nostril every 12 hours if needed for congestion for up to 2 days. Also use for bothersome nasal bleeding.  Do not use for more than 3 days. (Patient not taking: Reported on 5/7/2024) 30 mL 0     No current facility-administered medications on file prior to visit.        Review of Systems   Constitutional:  Negative for chills and fever.   HENT: Negative.     Respiratory:  Positive for cough.    Cardiovascular: Negative.    Gastrointestinal:  "Negative.  Negative for nausea and vomiting.   Endocrine: Negative.    Genitourinary: Negative.    Musculoskeletal: Negative.    Skin: Negative.  Negative for rash.   Allergic/Immunologic: Negative.    Neurological: Negative.    Hematological: Negative.    Psychiatric/Behavioral: Negative.     All other systems reviewed and are negative.      Objective   /70   Pulse 76   Ht 1.727 m (5' 8\")   Wt 95.9 kg (211 lb 6.4 oz)   BMI 32.14 kg/m²   BSA: 2.15 meters squared  Growth percentiles: Facility age limit for growth %ajay is 20 years. Facility age limit for growth %ajay is 20 years.   No visits with results within 1 Week(s) from this visit.   Latest known visit with results is:   Lab on 05/17/2024   Component Date Value Ref Range Status    Immunocap IgE 05/17/2024 18.1  <=214 KU/L Final    Note: Omalizumab (Xolair, GeneBleachers; humanized  IgG1 antihuman IgE Fc) treatment does not  significantly interfere with the accuracy of  total IgE on the ImmunoCAP ([employer]) platform.  J Allergy Clin Immunol 2006;117:759-66).  Allergens, parasitic diseases, smoking, and  alcohol consumption have been reported to  increase levels of total IgE in serum.    [place] Grass IgE 05/17/2024 <0.10  <0.10 kU/L Final    [place] Grass IgE 05/17/2024 <0.10  <0.10 kU/L Final    [place] Grass, [place] Blue IgE 05/17/2024 <0.10  <0.10 kU/L Final    Christian Grass IgE 05/17/2024 <0.10  <0.10 kU/L Final    [place], [place] IgE 05/17/2024 <0.10  <0.10 kU/L Final    Common Pigweed IgE 05/17/2024 <0.10  <0.10 kU/L Final    Common Ragweed IgE 05/17/2024 0.27 (Equiv IgE)  <0.10 kU/L Final    [place] IgE 05/17/2024 <0.10  <0.10 kU/L Final    Common Silver Birch IgE 05/17/2024 <0.10  <0.10 kU/L Final    Box-Elder IgE 05/17/2024 <0.10  <0.10 kU/L Final    [place]ountain Juniper IgE 05/17/2024 <0.10  <0.10 kU/L Final    Ray IgE 05/17/2024 <0.10  <0.10 kU/L Final    Elm IgE 05/17/2024 <0.10  <0.10 kU/L Final    [place] IgE 05/17/2024 <0.10  " <0.10 kU/L Final    Pb [place]son IgE 05/17/2024 <0.10  <0.10 kU/L Final    Maple [place], [place] * 05/17/2024 <0.10  <0.10 kU/L Final    [place] Tree IgE 05/17/2024 <0.10  <0.10 kU/L Final    Russian Thistle IgE 05/17/2024 <0.10  <0.10 kU/L Final    [place]ep [place] IgE 05/17/2024 <0.10  <0.10 kU/L Final    Cat Dander IgE 05/17/2024 <0.10  <0.10 kU/L Final    Dog Dander IgE 05/17/2024 <0.10  <0.10 kU/L Final    Alternaria Alternata IgE 05/17/2024 1.25 (Mod)  <0.10 kU/L Final    Cladosporium Herbarum IgE 05/17/2024 <0.10  <0.10 kU/L Final    English Plantain IgE 05/17/2024 0.27 (Equiv IgE)  <0.10 kU/L Final    Dust Mite (D. farinae) IgE 05/17/2024 <0.10  <0.10 kU/L Final    Dust Mite (D. pteronyssinus) IgE 05/17/2024 <0.10  <0.10 kU/L Final    Danish Cockroach IgE 05/17/2024 <0.10  <0.10 kU/L Final    Aspergillus Fumigatus IgE 05/17/2024 <0.10  <0.10 kU/L Final    [place]ak IgE 05/17/2024 <0.10  <0.10 kU/L Final    Penicillium Chrysogenum IgE 05/17/2024 <0.10  <0.10 kU/L Final      Physical Exam  Constitutional:       Appearance: Normal appearance.   HENT:      Head: Normocephalic and atraumatic.      Right Ear: Tympanic membrane normal.      Left Ear: Tympanic membrane normal.      Mouth/Throat:      Mouth: Mucous membranes are moist.   Cardiovascular:      Rate and Rhythm: Normal rate and regular rhythm.   Pulmonary:      Effort: Pulmonary effort is normal.      Breath sounds: Normal breath sounds.   Abdominal:      General: Bowel sounds are normal.   Neurological:      General: No focal deficit present.      Mental Status: He is alert.   Psychiatric:         Mood and Affect: Mood normal.         Assessment/Plan   Problem List Items Addressed This Visit             ICD-10-CM    Essential hypertension I10     Continue verapamil blood pressure is well-controlledContinue verapamil blood pressure is well-controlled         Cardiomegaly I51.7    Relevant Orders    Transthoracic Echo (TTE) Complete    Gastritis  without bleeding - Primary K29.70    Relevant Medications    omeprazole (PriLOSEC) 40 mg DR capsule    Other Relevant Orders    Referral to Gastroenterology

## 2024-07-18 DIAGNOSIS — J32.0 CHRONIC MAXILLARY SINUSITIS: Primary | ICD-10-CM

## 2024-07-18 RX ORDER — BUDESONIDE 0.5 MG/2ML
0.5 INHALANT ORAL 2 TIMES DAILY
Qty: 60 ML | Refills: 11 | Status: SHIPPED | OUTPATIENT
Start: 2024-07-18 | End: 2025-07-18

## 2024-07-18 RX ORDER — MUPIROCIN CALCIUM 20 MG/G
CREAM TOPICAL 3 TIMES DAILY
Qty: 15 G | Refills: 0 | Status: SHIPPED | OUTPATIENT
Start: 2024-07-18 | End: 2024-07-28

## 2024-07-25 ENCOUNTER — ANCILLARY PROCEDURE (OUTPATIENT)
Dept: CARDIOLOGY | Facility: CLINIC | Age: 29
End: 2024-07-25
Payer: COMMERCIAL

## 2024-07-25 DIAGNOSIS — I51.7 CARDIOMEGALY: ICD-10-CM

## 2024-07-25 LAB
AORTIC VALVE PEAK VELOCITY: 1.41 M/S
AV PEAK GRADIENT: 7.9 MMHG
AVA (PEAK VEL): 1.81 CM2
EJECTION FRACTION APICAL 4 CHAMBER: 52.8
EJECTION FRACTION: 53 %
LEFT ATRIUM VOLUME AREA LENGTH INDEX BSA: 21 ML/M2
LEFT VENTRICLE INTERNAL DIMENSION DIASTOLE: 4.72 CM (ref 3.5–6)
LEFT VENTRICULAR OUTFLOW TRACT DIAMETER: 1.91 CM
MITRAL VALVE E/A RATIO: 1.56
MITRAL VALVE E/E' RATIO: 7.09
RIGHT VENTRICLE FREE WALL PEAK S': 9 CM/S
RIGHT VENTRICLE PEAK SYSTOLIC PRESSURE: 21.7 MMHG
TRICUSPID ANNULAR PLANE SYSTOLIC EXCURSION: 2.1 CM

## 2024-07-25 PROCEDURE — 93306 TTE W/DOPPLER COMPLETE: CPT | Performed by: STUDENT IN AN ORGANIZED HEALTH CARE EDUCATION/TRAINING PROGRAM

## 2024-07-25 PROCEDURE — 93306 TTE W/DOPPLER COMPLETE: CPT

## 2024-08-05 ENCOUNTER — APPOINTMENT (OUTPATIENT)
Dept: ORTHOPEDIC SURGERY | Facility: HOSPITAL | Age: 29
End: 2024-08-05
Payer: COMMERCIAL

## 2024-08-05 ENCOUNTER — OFFICE VISIT (OUTPATIENT)
Dept: PULMONOLOGY | Facility: CLINIC | Age: 29
End: 2024-08-05
Payer: COMMERCIAL

## 2024-08-05 VITALS
DIASTOLIC BLOOD PRESSURE: 79 MMHG | BODY MASS INDEX: 31.52 KG/M2 | WEIGHT: 208 LBS | RESPIRATION RATE: 18 BRPM | TEMPERATURE: 98.4 F | HEART RATE: 98 BPM | HEIGHT: 68 IN | OXYGEN SATURATION: 96 % | SYSTOLIC BLOOD PRESSURE: 127 MMHG

## 2024-08-05 DIAGNOSIS — R91.8 LUNG NODULES: Primary | ICD-10-CM

## 2024-08-05 DIAGNOSIS — T14.90XA INHALATION INJURY: ICD-10-CM

## 2024-08-05 DIAGNOSIS — R05.8 COUGH ON EXERCISE: ICD-10-CM

## 2024-08-05 PROCEDURE — 1036F TOBACCO NON-USER: CPT | Performed by: INTERNAL MEDICINE

## 2024-08-05 PROCEDURE — 3074F SYST BP LT 130 MM HG: CPT | Performed by: INTERNAL MEDICINE

## 2024-08-05 PROCEDURE — 99214 OFFICE O/P EST MOD 30 MIN: CPT | Performed by: INTERNAL MEDICINE

## 2024-08-05 PROCEDURE — 3008F BODY MASS INDEX DOCD: CPT | Performed by: INTERNAL MEDICINE

## 2024-08-05 PROCEDURE — 3078F DIAST BP <80 MM HG: CPT | Performed by: INTERNAL MEDICINE

## 2024-08-05 ASSESSMENT — PAIN SCALES - GENERAL: PAINLEVEL: 0-NO PAIN

## 2024-08-06 NOTE — PROGRESS NOTES
Department of Medicine  Division of Pulmonary, Critical Care, and Sleep Medicine  Follow Up  [facility], [address]    Dany Mayorga is a 29 y.o. male who returns as a follow up for cough & wheeze. Last visit was on 6/18/2024.    Physician HPI (8/5/2024):  Since last visit he underwent methacholine testing which was negative, FeNO was also low. CT chest performed demonstrated multiple small nodules, the largest of which measured 5mm and was pleural based on the right lung. Overall, he feels his breathing is improving and he has used albuterol minimally.    Per previous notes:  29 y.o. male who had hydrogen sulfide exposure in March 2023. 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  He states that he [place] a facility that was cutting corners.  His coworker's hydrogen sulfide gas alarm started going off, and then shortly afterwards the patient felt as if a knife had pierced his heart.  He looked at himself in the mirror and noticed that his lips were blue.  He immediately left the scene and went to a local hospital.  Chest x-ray at that time was normal.  He was able to drive home to [place] from [place], however, a day later he developed a cough.  He had a repeat chest x-ray after coming back home which was also normal.  He had a persistent cough for 6 weeks after exposure which appears to have resolved. Prior to H2S gas exposure, he denied any difficulties with exercising or having any coughing/wheezing.     Lately, however, patient notices that when he is performing strenuous activity or attending to exercise, he develops wheezing and coughing which subsides after approximately 1 hour.  He states that he was diagnosed with asthma as a child and believes he had spirometry done at one point.  He appears to have been on Advair at some time but has not been on this for over 10 years.  He is a lifetime non-smoker.  His father is asthmatic.  He suffers from  seasonal allergies.  He is also here for a Worker's Compensation evaluation.     He had nasal septum repair with excision of nasal turbinates on 4/9/2024, and right ulnar nerve release surgery on 4/22/2024.        I have personally reviewed PMH, PSH, Family History in the HISTORY tab of this chart, and unless noted above are not pertinent to the presenting complaint.  I have personally reviewed Social History as provided in the electronic medical record.    Immunization History:  Immunization History   Administered Date(s) Administered    HPV, Quadrivalent 08/20/2013, 10/21/2013, 02/19/2014    Hepatitis A vaccine, pediatric/adolescent (HAVRIX, VAQTA) 08/20/2013, 02/19/2014    Meningococcal ACWY vaccine (MENVEO) 08/20/2013    Pfizer Purple Cap SARS-CoV-2 04/06/2021    Tdap vaccine, age 7 year and older (BOOSTRIX, ADACEL) 08/20/2013       Current Medications:  Current Outpatient Medications   Medication Instructions    albuterol (ProAir HFA) 90 mcg/actuation inhaler 2 puffs, inhalation, Every 4 hours PRN    budesonide (PULMICORT) 0.5 mg, nebulization, 2 times daily, Rinse mouth with water after use to reduce aftertaste and incidence of candidiasis. Do not swallow.    fluticasone (Flonase) 50 mcg/actuation nasal spray 2 sprays, Each Nostril, Daily, Shake gently. Before first use, prime pump. After use, clean tip and replace cap.    lamoTRIgine (LaMICtal) 100 mg tablet 1 tablet, oral, Nightly    omeprazole (PRILOSEC) 40 mg, oral, Daily before breakfast, Do not crush or chew.    oxymetazoline (Afrin) 0.05 % nasal spray 2 sprays, Each Nostril, Every 12 hours PRN, Also use for bothersome nasal bleeding.  Do not use for more than 3 days.    sodium chloride (Ocean) 0.65 % nasal spray 1 spray, Each Nostril, As needed, Use frequently throughout the day to keep nasal cavity moist and to help clear debris (mucous, clot, etc.)    verapamil SR (CALAN-SR) 240 mg, oral, Nightly        Drug Allergies/Intolerances:  Allergies  "  Allergen Reactions    Other Other     Seasonal    Sulfa (Sulfonamide Antibiotics) Other        Review of Systems:  Review of Systems     All other review of systems are negative and/or non-contributory.    Physical Examination:  Vitals:    24 1607   BP: 127/79   BP Location: Right arm   Patient Position: Sitting   Pulse: 98   Resp: 18   Temp: 36.9 °C (98.4 °F)   TempSrc: Temporal   SpO2: 96%   Weight: 94.3 kg (208 lb)   Height: 1.727 m (5' 8\")          GEN: no respiratory difficulties noted  CV: RRR, no m/g/r  LUNGS: good effort, clear bilaterally, no w/r/r  EXT: no edema, cyanosis, clubbing    Exacerbation History     N/A    Pulmonary Function Test Results     2024:  Methacholine provocation negative  FeNO: 23 ppb    5/15/2024:  FVC: 4.22 L (88%)  FEV1: 3.39 L (84%)  FEV1/FVC: 80  CLN50-12%: WNL  T.50 L (70%)  DLCO: 95%  PEF: 5.68    O2 Requirements     6MWT: none    CAT score     N/A    Peak Flow and ACT     N/A    Chest Radiograph     2024: reviewed independently by me. Questionable bibasilar granulomas vs blood vessels     3/13/2023: no evidence of disease    Chest CT Scan     2024:  The trachea and central airways are patent. No endobronchial lesion.      5 mm pleural-based right lower lobe nodule image 123. Several  additional subcentimeter nodules such as 3 mm right lower lobe nodule  image 107. 3-4 mm ground-glass right lower lobe nodule image 104. No  pleural effusions.    Labs     Lab Results   Component Value Date    WBC 9.3 2024    HGB 17.1 2024    HCT 52.7 (H) 2024    MCV 95 2024     2024     No results found for: \"BNP\"  Lab Results   Component Value Date    EOSABS 0.19 2023    ICIGE 18.1 2024       Echocardiogram     No results found for this or any previous visit from the past 365 days.       ASSESSMENT & PLAN     Problem List Items Addressed This Visit       Cough on exercise    Inhalation injury    Lung nodules - Primary    "     SUMMARY:  29 y.o. male with history of childhood asthma who suffered hydrogen sulfide gas exposure in March 2023. PFT ruled out obstruction, but does demonstrate mild restriction. Methacholine is negative ruling out asthma and reactive airways disease. FeNO testing was also negative. CT chest unremarkable except for a few nodules < 6mm. Overall, he is slowly improving since his initial inhalation injury. All testing has been negative thus far.    Plan:  -repeat CT chest July 2025 to document nodule stability    Follow-up: July 2025        Farzana Carlin DO  Staff Physician - Pulmonary & Critical Care  08/05/24 10:19 PM  Office number: [phone]   Fax number:  [phone]

## 2024-08-12 ENCOUNTER — APPOINTMENT (OUTPATIENT)
Dept: PRIMARY CARE | Facility: CLINIC | Age: 29
End: 2024-08-12
Payer: COMMERCIAL

## 2024-08-12 VITALS
SYSTOLIC BLOOD PRESSURE: 113 MMHG | WEIGHT: 209.6 LBS | BODY MASS INDEX: 31.77 KG/M2 | HEIGHT: 68 IN | DIASTOLIC BLOOD PRESSURE: 79 MMHG

## 2024-08-12 DIAGNOSIS — G43.009 MIGRAINE WITHOUT AURA, NOT REFRACTORY: ICD-10-CM

## 2024-08-12 DIAGNOSIS — I10 ESSENTIAL HYPERTENSION: Primary | ICD-10-CM

## 2024-08-12 DIAGNOSIS — G43.909 MIGRAINE WITHOUT STATUS MIGRAINOSUS, NOT INTRACTABLE, UNSPECIFIED MIGRAINE TYPE: ICD-10-CM

## 2024-08-12 DIAGNOSIS — T63.441D BEE STING, ACCIDENTAL OR UNINTENTIONAL, SUBSEQUENT ENCOUNTER: ICD-10-CM

## 2024-08-12 PROCEDURE — 3074F SYST BP LT 130 MM HG: CPT | Performed by: FAMILY MEDICINE

## 2024-08-12 PROCEDURE — 3008F BODY MASS INDEX DOCD: CPT | Performed by: FAMILY MEDICINE

## 2024-08-12 PROCEDURE — 3078F DIAST BP <80 MM HG: CPT | Performed by: FAMILY MEDICINE

## 2024-08-12 PROCEDURE — 99214 OFFICE O/P EST MOD 30 MIN: CPT | Performed by: FAMILY MEDICINE

## 2024-08-12 RX ORDER — VERAPAMIL HYDROCHLORIDE 240 MG/1
240 TABLET, FILM COATED, EXTENDED RELEASE ORAL NIGHTLY
Qty: 90 TABLET | Refills: 0 | Status: SHIPPED | OUTPATIENT
Start: 2024-08-12 | End: 2025-02-08

## 2024-08-12 ASSESSMENT — PATIENT HEALTH QUESTIONNAIRE - PHQ9
1. LITTLE INTEREST OR PLEASURE IN DOING THINGS: NOT AT ALL
2. FEELING DOWN, DEPRESSED OR HOPELESS: NOT AT ALL
SUM OF ALL RESPONSES TO PHQ9 QUESTIONS 1 AND 2: 0

## 2024-08-12 ASSESSMENT — ENCOUNTER SYMPTOMS
DEPRESSION: 0
LOSS OF SENSATION IN FEET: 0
OCCASIONAL FEELINGS OF UNSTEADINESS: 0

## 2024-08-12 NOTE — PROGRESS NOTES
Subjective   Patient ID: Dany Mayorga is a 29 y.o. male who presents for Follow-up, Migraine, and Insect Bite (Rash , hives).      HPI  Current Outpatient Medications on File Prior to Visit   Medication Sig Dispense Refill    albuterol (ProAir HFA) 90 mcg/actuation inhaler Inhale 2 puffs every 4 hours if needed for wheezing or shortness of breath. 8 g 5    budesonide (Pulmicort) 0.5 mg/2 mL nebulizer solution Take 2 mL (0.5 mg) by nebulization 2 times a day. Rinse mouth with water after use to reduce aftertaste and incidence of candidiasis. Do not swallow. 60 mL 11    fluticasone (Flonase) 50 mcg/actuation nasal spray Administer 2 sprays into each nostril once daily. Shake gently. Before first use, prime pump. After use, clean tip and replace cap. 16 g 2    lamoTRIgine (LaMICtal) 100 mg tablet Take 1 tablet (100 mg) by mouth once daily at bedtime.      omeprazole (PriLOSEC) 40 mg DR capsule Take 1 capsule (40 mg) by mouth once daily in the morning. Take before meals. Do not crush or chew. (Patient not taking: Reported on 8/5/2024) 90 capsule 0    oxymetazoline (Afrin) 0.05 % nasal spray Administer 2 sprays into each nostril every 12 hours if needed for congestion for up to 2 days. Also use for bothersome nasal bleeding.  Do not use for more than 3 days. (Patient not taking: Reported on 5/7/2024) 30 mL 0    sodium chloride (Ocean) 0.65 % nasal spray Administer 1 spray into each nostril if needed for congestion. Use frequently throughout the day to keep nasal cavity moist and to help clear debris (mucous, clot, etc.) 30 mL 12    [DISCONTINUED] verapamil SR (Calan-SR) 240 mg ER tablet Take 1 tablet (240 mg) by mouth once daily at bedtime. 90 tablet 0     No current facility-administered medications on file prior to visit.        Review of Systems   Constitutional:  Negative for chills and fever.   HENT: Negative.     Respiratory: Negative.     Cardiovascular: Negative.    Gastrointestinal: Negative.  Negative for  "nausea and vomiting.   Endocrine: Negative.    Genitourinary: Negative.    Musculoskeletal: Negative.    Skin: Negative.  Negative for rash.   Allergic/Immunologic: Negative.    Neurological: Negative.    Hematological: Negative.    Psychiatric/Behavioral: Negative.     All other systems reviewed and are negative.      Objective   /79   Ht 1.727 m (5' 8\")   Wt 95.1 kg (209 lb 9.6 oz)   BMI 31.87 kg/m²   BSA: 2.14 meters squared  Growth percentiles: Facility age limit for growth %ajay is 20 years. Facility age limit for growth %ajay is 20 years.   No visits with results within 1 Week(s) from this visit.   Latest known visit with results is:   Ancillary Procedure on 07/25/2024   Component Date Value Ref Range Status    AV pk fernanda 07/25/2024 1.41  m/s Final    LVOT diam 07/25/2024 1.91  cm Final    MV avg E/e' ratio 07/25/2024 7.09   Final    MV E/A ratio 07/25/2024 1.56   Final    LA vol index A/L 07/25/2024 21.0  ml/m2 Final    Tricuspid annular plane systolic e* 07/25/2024 2.1  cm Final    LV EF 07/25/2024 53  % Final    RV free wall pk S' 07/25/2024 9.00  cm/s Final    LVIDd 07/25/2024 4.72  cm Final    RVSP 07/25/2024 21.7  mmHg Final    Aortic Valve Area by Continuity of* 07/25/2024 1.81  cm2 Final    AV pk grad 07/25/2024 7.9  mmHg Final    LV A4C EF 07/25/2024 52.8   Final      Physical Exam  Vitals and nursing note reviewed.   Constitutional:       Appearance: Normal appearance.   HENT:      Head: Normocephalic and atraumatic.      Right Ear: Tympanic membrane normal.      Left Ear: Tympanic membrane normal.      Nose: Nose normal.      Mouth/Throat:      Mouth: Mucous membranes are moist.   Eyes:      Pupils: Pupils are equal, round, and reactive to light.   Cardiovascular:      Rate and Rhythm: Normal rate and regular rhythm.      Pulses: Normal pulses.      Heart sounds: Normal heart sounds.   Pulmonary:      Effort: Pulmonary effort is normal.      Breath sounds: Normal breath sounds.   Abdominal: "      General: Abdomen is flat. Bowel sounds are normal.      Palpations: Abdomen is soft.   Musculoskeletal:         General: Normal range of motion.      Cervical back: Normal range of motion and neck supple.   Skin:     General: Skin is warm and dry.      Capillary Refill: Capillary refill takes less than 2 seconds.   Neurological:      General: No focal deficit present.      Mental Status: He is alert and oriented to person, place, and time.   Psychiatric:         Mood and Affect: Mood normal.         Assessment/Plan   Problem List Items Addressed This Visit             ICD-10-CM    Essential hypertension - Primary I10     Continue verapamil blood pressure is well-controlledContinue verapamil blood pressure is well-controlled         Relevant Medications    verapamil SR (Calan-SR) 240 mg ER tablet    Migraine without aura, not refractory G43.009     Continue verapamil   Will start Ubrelvy as needed. Monitor headache  . Some suggestions for preventing or controlling your headache:  - Magnesium (preferably glycinate but oxide or sulfate are acceptable 400-500 mg daily is recommended as a potential migraine preventative treatment by the American Head[employer] Society. Side effects include diarrhea.  - Riboflavin (vitamin B2) can be another beneficial migraine preventative treatment, 200 mg twice a day.  - Another potential beneficial migraine preventative treatment is coenzyme Q10 150 mg twice a day.  - Melatonin 3-5 mg 2-4 hours before bedtime can be helpful for difficulty with sleeping and for headaches, especially cluster headaches but also migraines.  - Avoid triggers that can cause or worsen migraines (food, lack of sleep, stress)  - Keep a diary of your headaches to note how often you get them, how long they last and any other helpful information  - Avoid taking medication for treatment of headaches (NOT preventative medication) more than 3 days per week. This includes both prescription medication and over the  counter medication.           Migraine without status migrainosus, not intractable G43.909    Relevant Medications    rimegepant (Nurtec ODT) 75 mg tablet,disintegrating    Bee sting T63.441A       Bee sting         dayanara

## 2024-08-18 PROBLEM — T63.441A BEE STING: Status: ACTIVE | Noted: 2024-08-18

## 2024-08-18 PROBLEM — G43.909 MIGRAINE WITHOUT STATUS MIGRAINOSUS, NOT INTRACTABLE: Status: ACTIVE | Noted: 2024-08-18

## 2024-08-18 ASSESSMENT — ENCOUNTER SYMPTOMS
CHILLS: 0
VOMITING: 0
NAUSEA: 0
ALLERGIC/IMMUNOLOGIC NEGATIVE: 1
HEMATOLOGIC/LYMPHATIC NEGATIVE: 1
CARDIOVASCULAR NEGATIVE: 1
PSYCHIATRIC NEGATIVE: 1
NEUROLOGICAL NEGATIVE: 1
RESPIRATORY NEGATIVE: 1
FEVER: 0
ENDOCRINE NEGATIVE: 1
GASTROINTESTINAL NEGATIVE: 1
MUSCULOSKELETAL NEGATIVE: 1

## 2024-08-27 ENCOUNTER — OFFICE VISIT (OUTPATIENT)
Dept: PRIMARY CARE | Facility: CLINIC | Age: 29
End: 2024-08-27
Payer: COMMERCIAL

## 2024-08-27 VITALS
HEART RATE: 82 BPM | BODY MASS INDEX: 31.75 KG/M2 | SYSTOLIC BLOOD PRESSURE: 121 MMHG | WEIGHT: 208.8 LBS | DIASTOLIC BLOOD PRESSURE: 70 MMHG | OXYGEN SATURATION: 95 %

## 2024-08-27 DIAGNOSIS — F41.1 GENERALIZED ANXIETY DISORDER: ICD-10-CM

## 2024-08-27 DIAGNOSIS — I10 ESSENTIAL HYPERTENSION: Primary | ICD-10-CM

## 2024-08-27 DIAGNOSIS — G43.109 MIGRAINE WITH AURA AND WITHOUT STATUS MIGRAINOSUS, NOT INTRACTABLE: ICD-10-CM

## 2024-08-27 DIAGNOSIS — R09.81 NASAL CONGESTION: ICD-10-CM

## 2024-08-27 DIAGNOSIS — K58.1 IRRITABLE BOWEL SYNDROME WITH CONSTIPATION: ICD-10-CM

## 2024-08-27 PROCEDURE — 3078F DIAST BP <80 MM HG: CPT | Performed by: FAMILY MEDICINE

## 2024-08-27 PROCEDURE — 3074F SYST BP LT 130 MM HG: CPT | Performed by: FAMILY MEDICINE

## 2024-08-27 PROCEDURE — 99214 OFFICE O/P EST MOD 30 MIN: CPT | Performed by: FAMILY MEDICINE

## 2024-08-29 ENCOUNTER — APPOINTMENT (OUTPATIENT)
Dept: OTOLARYNGOLOGY | Facility: CLINIC | Age: 29
End: 2024-08-29
Payer: COMMERCIAL

## 2024-08-29 VITALS — BODY MASS INDEX: 31.63 KG/M2 | WEIGHT: 208 LBS

## 2024-08-29 DIAGNOSIS — J32.9 CHRONIC SINUSITIS, UNSPECIFIED LOCATION: Primary | ICD-10-CM

## 2024-08-29 PROCEDURE — 1036F TOBACCO NON-USER: CPT | Performed by: GENERAL PRACTICE

## 2024-08-29 PROCEDURE — 99213 OFFICE O/P EST LOW 20 MIN: CPT | Performed by: GENERAL PRACTICE

## 2024-08-29 RX ORDER — FLUTICASONE PROPIONATE 93 UG/1
1 SPRAY, METERED NASAL 2 TIMES DAILY
Qty: 6.4 ML | Refills: 0 | COMMUNITY
Start: 2024-08-29

## 2024-08-29 RX ORDER — FLUTICASONE PROPIONATE 93 UG/1
1 SPRAY, METERED NASAL 2 TIMES DAILY
Qty: 6.4 ML | Refills: 2 | Status: SHIPPED | OUTPATIENT
Start: 2024-08-29

## 2024-08-29 NOTE — PROGRESS NOTES
Otolaryngology - Head and Neck Surgery Outpatient New Patient Visit Note        Assessment/Plan:   Problem List Items Addressed This Visit             ICD-10-CM       ENT    Chronic sinusitis - Primary J32.9       29yoM with a history of chronic sinusitis s/p septoFESS, overall doing well.  Some waxing/waning irritation in nose and congestion.  Using flonase and saline irrigations with   burning irritation after use.  Limited benefit from prior nasacort use.   Discussed trial of Xhance.   Can decreased saline irrigations to PRN.       Follow up:  -plan for follow up in clinic as needed and in 2-4 months    All of the above findings, impressions, treatment planning and follow up plans were discussed with the patient who indicated understanding.  the patient was instructed to contact or return to clinic sooner if symptoms/signs persist or worsen despite the above management.      Juan Francisco Nicole MD  Otolaryngology - Head and Neck Surgery            History Of Present Illness  Dany Mayorga is a 29 y.o. male presenting for follow up s/p septoFESS (b/l MMA, andres takedown).   Reports overall doing well.   Notes some burning irritation in nose after use of flonase and saline irrigations.   Notes some intermittent congestion and sinus pressure.   No pain or rhinorrhea.      Recall      28yoM s/p septoFESS presents for follow up.    Reports good nasal airflow and no sinus pain/pressure  Mild baseline congetion.    No purulent rhinorrhea  Decreasing need to complete   irrigations.      recall     The pt presents for postoperative evaluation s/p septoFESS (b/l MMA, andres takedown)  on DOS 4/9/24.    No acute complaints currently.  Stable postoperative course.  No fevers, chills, N/V, SOB.  No significant bleeding.  Back to regular activity levels.  No dietary restrictions.        Past Medical History  He has a past medical history of Anxiety, Cervical disc disorder (10/29/2020), Chronic ethmoidal sinusitis, Chronic  maxillary sinusitis, Chronic pain disorder (10/30/2020), Class 1 obesity with body mass index (BMI) of 32.0 to 32.9 in adult (04/02/2024), Depression (2009), Deviated septum, Extremity pain (10/29/2020), HTN (hypertension), Migraine (2014), Nasal congestion, Nasal turbinate hypertrophy, Neck pain (10/29/2020), Personal history of other specified conditions (12/08/2021), and Right lower quadrant pain (12/08/2021).    Surgical History  He has a past surgical history that includes Epidural block injection (July 2023); Colonoscopy; [place] tooth extraction; Nasal septoplasty w/ turbinoplasty (Bilateral, 04/09/2024); and Sinusotomy (Bilateral, 04/09/2024).     Social History  He reports that he has never smoked. He has never been exposed to tobacco smoke. He has never used smokeless tobacco. He reports current alcohol use of about 1.0 - 2.0 standard drink of alcohol per week. He reports that he does not use drugs.    Family History  Family History   Problem Relation Name Age of Onset    Hypertension Mother Nora Mayorga     Graves' disease Mother Nora Mayorga     Depression Mother Nora Mayorga     Paranoid behavior Father Power Mayorga     Anxiety disorder Father Power Mayorga     Migraines Father Power Collinslali     Other (hld) Father Power Salmeroneltonlali     Hyperlipidemia Father Power Mayorga     Hypertension Father Power Mayorga     Mental illness Father Power Mayorga     Asthma Father Power Mayorga     Migraines Sister Sonia     Thyroid disease Sister Sonia     Depression Sister Sonia     Migraines Brother      Asperger's syndrome Brother      Depression Brother Shelton     Hypertension Brother Shelton     Mental illness Brother Shelton     Migraines Brother Shelton     GI problems Father's Brother Allen Mayorga     Arthritis Maternal Grandmother Hellen Melissa     Cancer Maternal Grandmother Hellen Melissa     Depression Maternal Grandmother Hellen Melissa     Diabetes Maternal Grandmother Hellen Melissa     Thyroid  disease Maternal Grandfather Jeremiah Melissa     Other (bca) Maternal Grandfather Jeremiah Torres     Diabetes type II Maternal Grandfather Jeremiah Melissa     Muscular dystrophy Maternal Grandfather Jeremiah Melissa     Cancer Maternal Grandfather Jeremiah Melissa     Diabetes Maternal Grandfather Jeremiah Melissa     Muscular dystrophy Paternal Grandfather Cal Salmeronhalali     Melanoma Paternal Grandfather Cal Salmeronhalali     Early natural death Paternal Grandfather Cal Fellhauer     GI problems Paternal Grandfather Cal Fellhauer         Allergies  Bee venom protein (honey bee), Other, and Sulfa (sulfonamide antibiotics)    Review of Systems  ROS: Pertinent positives as noted in HPI.    - CONSTITUTIONAL: Does not report weight loss, fever or chills.    - HEENT:   Ear: Does not report tinnitus, vertigo, hearing loss, otalgia, otorrhea  Nose: Does not report  , rhinorrhea, epistaxis, decreased smell  Throat: Does not report pain, dysphagia, odynophagia  Larynx: Does not report hoarseness,  difficulty breathing, pain with speaking (odynophonia)  Neck: Does not report new masses, pain, swelling  Face: Does not report sinus pain, pressure, swelling, numbness, weakness     - RESPIRATORY: Does not report SOB or cough.    - CV: Does not report palpitations or chest pain.     - GI: Does not report abdominal pain, nausea, vomiting or diarrhea.    - : Does not report dysuria or urinary frequency.    - MSK: Does not report myalgia or joint pain.    - SKIN: Does not report rash or pruritus.    - NEUROLOGICAL: Does not report headache or syncope.    - PSYCHIATRIC: Does not report recent changes in mood. Does not report anxiety or depression.         Physical Exam:     GENERAL:   Alert & Oriented to person, place and time; Normal affect and appearance. Well developed and well nourished. Conversant & cooperative with examination.     HEAD:   Normocephalic, atraumatic. No sinus tenderness to palpation. Normal parotid bilaterally. Normal facial strength.     NEUROLOGIC:    Cranial nerves II-XII grossly intact, gait WNL. Normal mood and affect.    EYES:   Extraocular movements intact. Pupils equal, round, reactive to light and accommodation. No nystagmus, no ptosis. no scleral injection.    EAR:   Normal auricle. No discomfort or TTP with manipulation.   Handheld otoscopic exam showed normal external auditory canals bilaterally. No purulence or EAC inflammation. Minimal cerumen.   Right tympanic membrane clear and mobile without evidence of perforation, retraction or middle ear effusion.   Left tympanic membrane clear and mobile without evidence of perforation, retraction or middle ear effusion.     NOSE:   No external deformity. No external nasal lesions, lacerations, or scars. Nasal tip symmetrical with normal nasal valves.   Nasal cavity with essentially midline septum, erythematous mucosa and turbinates. No lesions, masses, purulence or polyps.     OC/OP:   Mucous membranes moist, no masses, lesions or exudates.   Normal tongue, floor of mouth, teeth, gums, lips. Normal posterior pharyngeal wall.    Normal tonsils without erythema, exudate or obvious calculi     NECK:   No neck masses or thyroid enlargement. Trachea midline. No tenderness to palpation    LYMPHATIC:   No cervical lymphadenopathy.     RESPIRATORY:   Symmetric chest elevation & no retractions. No significant hoarseness. No increased work of breathing.    CV:   No clubbing or cyanosis. No obvious edema    Skin:   No facial rashes, vesicles or lesions.     Extremities:   No gross abnormalities      Clinic Procedure        Information review:  External sources (notes, imaging, lab results) listed below personally reviewed to aid in medical decision making process.  -  -  -

## 2024-08-29 NOTE — PROGRESS NOTES
Dany Mayorga is a 29 y.o. male with past medical history of *** who is referred by PCP Dr. Jose G Funk for gastritis.    CT scan showed thickening of the gastric wall denies abdominal pain or acid reflux but has had sinus issues and chronic cough had sinus surgery     He reports a *** history of ***. Associated with ***. Worse with ***. He has tried *** with *** improvement.     Denies nausea, vomiting, heartburn, early satiety, and bloating. There has been no change in bowels. He has *** bowel movements per day. No rectal bleeding, hematochezia, or melena. No unintentional weight loss.     He does not take NSAIDS regularly.     No prior EGD or colonoscopy.    Social history: -    Family history: Denies family history of colon cancer or other GI disorders or malignancy.     Past Medical History:   Diagnosis Date    Anxiety     Cervical disc disorder 10/29/2020    Chronic ethmoidal sinusitis     Chronic maxillary sinusitis     Chronic pain disorder 10/30/2020    Class 1 obesity with body mass index (BMI) of 32.0 to 32.9 in adult 04/02/2024    Depression 2009    Deviated septum     Extremity pain 10/29/2020    HTN (hypertension)     Migraine 2014    Nasal congestion     Nasal turbinate hypertrophy     Neck pain 10/29/2020    Personal history of other specified conditions 12/08/2021    History of fever    Right lower quadrant pain 12/08/2021    Abdominal pain, acute, right lower quadrant     Past Surgical History:   Procedure Laterality Date    COLONOSCOPY      EPIDURAL BLOCK INJECTION  July 2023    NASAL SEPTOPLASTY W/ TURBINOPLASTY Bilateral 04/09/2024    SINUSOTOMY Bilateral 04/09/2024    WISDOM TOOTH EXTRACTION       Current Outpatient Medications   Medication Sig Dispense Refill    albuterol (ProAir HFA) 90 mcg/actuation inhaler Inhale 2 puffs every 4 hours if needed for wheezing or shortness of breath. 8 g 5    budesonide (Pulmicort) 0.5 mg/2 mL nebulizer solution Take 2 mL (0.5 mg) by nebulization 2 times  a day. Rinse mouth with water after use to reduce aftertaste and incidence of candidiasis. Do not swallow. 60 mL 11    fluticasone (Flonase) 50 mcg/actuation nasal spray Administer 2 sprays into each nostril once daily. Shake gently. Before first use, prime pump. After use, clean tip and replace cap. 16 g 2    fluticasone propionate (Xhance) 93 mcg/actuation aerosol breath activated Administer 1 puff into affected nostril(s) 2 times a day. 6.4 mL 0    fluticasone propionate (Xhance) 93 mcg/actuation aerosol breath activated Administer 1 puff into affected nostril(s) 2 times a day. 6.4 mL 2    lamoTRIgine (LaMICtal) 100 mg tablet Take 1 tablet (100 mg) by mouth once daily at bedtime.      rimegepant (Nurtec ODT) 75 mg tablet,disintegrating Take 1 tablet (75 mg) by mouth once daily as needed (headache). 16 tablet 3    sodium chloride (Ocean) 0.65 % nasal spray Administer 1 spray into each nostril if needed for congestion. Use frequently throughout the day to keep nasal cavity moist and to help clear debris (mucous, clot, etc.) 30 mL 12    verapamil SR (Calan-SR) 240 mg ER tablet Take 1 tablet (240 mg) by mouth once daily at bedtime. 90 tablet 0     No current facility-administered medications for this visit.       Review of Systems  Review of Systems negative except as noted in HPI.    Objective     There were no vitals taken for this visit.     Physical Exam  Constitutional:  No acute distress. Normal appearance. Not ill-appearing.  HENT:  Head normocephalic and atraumatic. Conjunctivae normal.  Cardiovascular:  Normal rate. Regular rhythm.  Pulmonary:  Pulmonary effort normal. No respiratory distress. Breath sounds clear.  Abdominal:  Abdomen is flat and soft. There is no distension. No tenderness or guarding.  Skin: Dry.  Neurological:  Alert and oriented.  Psychiatric:  Mood and affect normal.    Assessment/Plan     29 y.o. male with history of *** who presents today for clinic visit for *** history of  ***.    Recommendations  1.  2. Follow up    Electronically signed by: Lakesha Sinha CNP on 8/29/2024 at 9:25 AM

## 2024-08-30 DIAGNOSIS — K58.1 IRRITABLE BOWEL SYNDROME WITH CONSTIPATION: Primary | ICD-10-CM

## 2024-08-30 RX ORDER — PLECANATIDE 3 MG/1
3 TABLET ORAL DAILY
Qty: 90 TABLET | Refills: 3 | Status: SHIPPED | OUTPATIENT
Start: 2024-08-30 | End: 2025-08-30

## 2024-09-02 ASSESSMENT — ENCOUNTER SYMPTOMS
CONSTITUTIONAL NEGATIVE: 1
ARTHRALGIAS: 1
RESPIRATORY NEGATIVE: 1
CONSTIPATION: 1

## 2024-09-03 NOTE — ASSESSMENT & PLAN NOTE
C/w verapamil  . Some suggestions for preventing or controlling your headache:  - Magnesium (preferably glycinate but oxide or sulfate are acceptable 400-500 mg daily is recommended as a potential migraine preventative treatment by the American Headache Society. Side effects include diarrhea.  - Riboflavin (vitamin B2) can be another beneficial migraine preventative treatment, 200 mg twice a day.  - Another potential beneficial migraine preventative treatment is coenzyme Q10 150 mg twice a day.  - Melatonin 3-5 mg 2-4 hours before bedtime can be helpful for difficulty with sleeping and for headaches, especially cluster headaches but also migraines.  - Avoid triggers that can cause or worsen migraines (food, lack of sleep, stress)  - Keep a diary of your headaches to note how often you get them, how long they last and any other helpful information  - Avoid taking medication for treatment of headaches (NOT preventative medication) more than 3 days per week. This includes both prescription medication and over the counter medication.  .

## 2024-09-03 NOTE — PROGRESS NOTES
Subjective   Patient ID: Dany Mayorga is a 29 y.o. male who presents for Dizziness, Nausea, and Constipation.      HPI  Saw o[tometry Was told tehre was a concern with retina/ Sent to retina specialist S/b Retina specialist States he  was told he had retinal problem related to B sting. Is scheduled to return in 4 weeks to Retina   Constipation worse  Anxiety/depresison   Current Outpatient Medications on File Prior to Visit   Medication Sig Dispense Refill    lamoTRIgine (LaMICtal) 100 mg tablet Take 1 tablet (100 mg) by mouth once daily at bedtime.      rimegepant (Nurtec ODT) 75 mg tablet,disintegrating Take 1 tablet (75 mg) by mouth once daily as needed (headache). 16 tablet 3    verapamil SR (Calan-SR) 240 mg ER tablet Take 1 tablet (240 mg) by mouth once daily at bedtime. 90 tablet 0    [DISCONTINUED] albuterol (ProAir HFA) 90 mcg/actuation inhaler Inhale 2 puffs every 4 hours if needed for wheezing or shortness of breath. 8 g 5    [DISCONTINUED] budesonide (Pulmicort) 0.5 mg/2 mL nebulizer solution Take 2 mL (0.5 mg) by nebulization 2 times a day. Rinse mouth with water after use to reduce aftertaste and incidence of candidiasis. Do not swallow. 60 mL 11    [DISCONTINUED] sodium chloride (Ocean) 0.65 % nasal spray Administer 1 spray into each nostril if needed for congestion. Use frequently throughout the day to keep nasal cavity moist and to help clear debris (mucous, clot, etc.) 30 mL 12    fluticasone (Flonase) 50 mcg/actuation nasal spray Administer 2 sprays into each nostril once daily. Shake gently. Before first use, prime pump. After use, clean tip and replace cap. 16 g 2    [DISCONTINUED] omeprazole (PriLOSEC) 40 mg DR capsule Take 1 capsule (40 mg) by mouth once daily in the morning. Take before meals. Do not crush or chew. (Patient not taking: Reported on 8/5/2024) 90 capsule 0    [DISCONTINUED] oxymetazoline (Afrin) 0.05 % nasal spray Administer 2 sprays into each nostril every 12 hours if  needed for congestion for up to 2 days. Also use for bothersome nasal bleeding.  Do not use for more than 3 days. (Patient not taking: Reported on 5/7/2024) 30 mL 0     No current facility-administered medications on file prior to visit.        Review of Systems   Constitutional: Negative.    HENT:  Positive for congestion.    Respiratory: Negative.     Gastrointestinal:  Positive for constipation.   Genitourinary: Negative.    Musculoskeletal:  Positive for arthralgias.       Objective   /70 (BP Location: Right arm, Patient Position: Sitting)   Pulse 82   Wt 94.7 kg (208 lb 12.8 oz)   SpO2 95%   BMI 31.75 kg/m²   BSA: 2.13 meters squared  Growth percentiles: Facility age limit for growth %ajay is 20 years. Facility age limit for growth %ajay is 20 years.   No visits with results within 1 Week(s) from this visit.   Latest known visit with results is:   Ancillary Procedure on 07/25/2024   Component Date Value Ref Range Status    AV pk fernanda 07/25/2024 1.41  m/s Final    LVOT diam 07/25/2024 1.91  cm Final    MV avg E/e' ratio 07/25/2024 7.09   Final    MV E/A ratio 07/25/2024 1.56   Final    LA vol index A/L 07/25/2024 21.0  ml/m2 Final    Tricuspid annular plane systolic e* 07/25/2024 2.1  cm Final    LV EF 07/25/2024 53  % Final    RV free wall pk S' 07/25/2024 9.00  cm/s Final    LVIDd 07/25/2024 4.72  cm Final    RVSP 07/25/2024 21.7  mmHg Final    Aortic Valve Area by Continuity of* 07/25/2024 1.81  cm2 Final    AV pk grad 07/25/2024 7.9  mmHg Final    LV A4C EF 07/25/2024 52.8   Final      Physical Exam  Vitals and nursing note reviewed.   Constitutional:       Appearance: Normal appearance.   HENT:      Head: Normocephalic and atraumatic.      Right Ear: Tympanic membrane normal.      Left Ear: Tympanic membrane normal.      Nose: Nose normal.      Mouth/Throat:      Mouth: Mucous membranes are moist.   Eyes:      Pupils: Pupils are equal, round, and reactive to light.   Cardiovascular:      Rate and  Rhythm: Normal rate and regular rhythm.      Pulses: Normal pulses.      Heart sounds: Normal heart sounds.   Pulmonary:      Effort: Pulmonary effort is normal.      Breath sounds: Normal breath sounds.   Abdominal:      General: Abdomen is flat. There is distension.      Palpations: Abdomen is soft.      Tenderness: There is abdominal tenderness.   Musculoskeletal:         General: Normal range of motion.      Cervical back: Normal range of motion and neck supple.   Skin:     General: Skin is warm and dry.      Capillary Refill: Capillary refill takes less than 2 seconds.   Neurological:      General: No focal deficit present.      Mental Status: He is alert and oriented to person, place, and time.   Psychiatric:         Mood and Affect: Mood normal.         Assessment/Plan   Problem List Items Addressed This Visit             ICD-10-CM    Essential hypertension - Primary I10     Continue verapamil blood pressure is well-controlledContinue verapamil blood pressure is well-controlled         Generalized anxiety disorder F41.1     Has been referred to psychiatry provided additional numbers besides  due to scheduling delay         Irritable bowel syndrome with constipation K58.1     Start Trulance Maintain fluid and wate rintake         Nasal congestion R09.81    Migraine without status migrainosus, not intractable G43.909     C/w verapamil  . Some suggestions for preventing or controlling your headache:  - Magnesium (preferably glycinate but oxide or sulfate are acceptable 400-500 mg daily is recommended as a potential migraine preventative treatment by the American Headache Society. Side effects include diarrhea.  - Riboflavin (vitamin B2) can be another beneficial migraine preventative treatment, 200 mg twice a day.  - Another potential beneficial migraine preventative treatment is coenzyme Q10 150 mg twice a day.  - Melatonin 3-5 mg 2-4 hours before bedtime can be helpful for difficulty with sleeping and for  headaches, especially cluster headaches but also migraines.  - Avoid triggers that can cause or worsen migraines (food, lack of sleep, stress)  - Keep a diary of your headaches to note how often you get them, how long they last and any other helpful information  - Avoid taking medication for treatment of headaches (NOT preventative medication) more than 3 days per week. This includes both prescription medication and over the counter medication.  .

## 2024-09-09 ENCOUNTER — APPOINTMENT (OUTPATIENT)
Dept: GASTROENTEROLOGY | Facility: HOSPITAL | Age: 29
End: 2024-09-09
Payer: COMMERCIAL

## 2024-09-16 ENCOUNTER — APPOINTMENT (OUTPATIENT)
Dept: ALLERGY | Facility: CLINIC | Age: 29
End: 2024-09-16
Payer: COMMERCIAL

## 2024-10-14 ENCOUNTER — HOSPITAL ENCOUNTER (EMERGENCY)
Facility: HOSPITAL | Age: 29
Discharge: HOME | End: 2024-10-14
Attending: STUDENT IN AN ORGANIZED HEALTH CARE EDUCATION/TRAINING PROGRAM
Payer: COMMERCIAL

## 2024-10-14 ENCOUNTER — OFFICE VISIT (OUTPATIENT)
Dept: URGENT CARE | Age: 29
End: 2024-10-14
Payer: COMMERCIAL

## 2024-10-14 ENCOUNTER — APPOINTMENT (OUTPATIENT)
Dept: RADIOLOGY | Facility: HOSPITAL | Age: 29
End: 2024-10-14
Payer: COMMERCIAL

## 2024-10-14 VITALS
RESPIRATION RATE: 16 BRPM | WEIGHT: 210 LBS | HEIGHT: 68 IN | DIASTOLIC BLOOD PRESSURE: 90 MMHG | HEART RATE: 80 BPM | SYSTOLIC BLOOD PRESSURE: 157 MMHG | BODY MASS INDEX: 31.83 KG/M2 | OXYGEN SATURATION: 98 % | TEMPERATURE: 97.5 F

## 2024-10-14 VITALS
SYSTOLIC BLOOD PRESSURE: 129 MMHG | HEART RATE: 97 BPM | RESPIRATION RATE: 18 BRPM | TEMPERATURE: 97.6 F | WEIGHT: 210 LBS | OXYGEN SATURATION: 96 % | DIASTOLIC BLOOD PRESSURE: 82 MMHG | BODY MASS INDEX: 31.93 KG/M2

## 2024-10-14 DIAGNOSIS — R10.31 RLQ ABDOMINAL PAIN: Primary | ICD-10-CM

## 2024-10-14 DIAGNOSIS — R10.31 RIGHT LOWER QUADRANT ABDOMINAL PAIN: ICD-10-CM

## 2024-10-14 LAB
ALBUMIN SERPL BCP-MCNC: 4.7 G/DL (ref 3.4–5)
ALP SERPL-CCNC: 121 U/L (ref 33–120)
ALT SERPL W P-5'-P-CCNC: 27 U/L (ref 10–52)
ANION GAP SERPL CALC-SCNC: 12 MMOL/L (ref 10–20)
AST SERPL W P-5'-P-CCNC: 15 U/L (ref 9–39)
BASOPHILS # BLD AUTO: 0.06 X10*3/UL (ref 0–0.1)
BASOPHILS NFR BLD AUTO: 0.8 %
BILIRUB SERPL-MCNC: 0.5 MG/DL (ref 0–1.2)
BUN SERPL-MCNC: 12 MG/DL (ref 6–23)
CALCIUM SERPL-MCNC: 8.6 MG/DL (ref 8.6–10.3)
CHLORIDE SERPL-SCNC: 107 MMOL/L (ref 98–107)
CO2 SERPL-SCNC: 26 MMOL/L (ref 21–32)
CREAT SERPL-MCNC: 0.77 MG/DL (ref 0.5–1.3)
EGFRCR SERPLBLD CKD-EPI 2021: >90 ML/MIN/1.73M*2
EOSINOPHIL # BLD AUTO: 0.26 X10*3/UL (ref 0–0.7)
EOSINOPHIL NFR BLD AUTO: 3.3 %
ERYTHROCYTE [DISTWIDTH] IN BLOOD BY AUTOMATED COUNT: 12.5 % (ref 11.5–14.5)
GLUCOSE SERPL-MCNC: 98 MG/DL (ref 74–99)
HCT VFR BLD AUTO: 51.2 % (ref 41–52)
HGB BLD-MCNC: 17.9 G/DL (ref 13.5–17.5)
IMM GRANULOCYTES # BLD AUTO: 0.02 X10*3/UL (ref 0–0.7)
IMM GRANULOCYTES NFR BLD AUTO: 0.3 % (ref 0–0.9)
LIPASE SERPL-CCNC: 30 U/L (ref 9–82)
LYMPHOCYTES # BLD AUTO: 2.17 X10*3/UL (ref 1.2–4.8)
LYMPHOCYTES NFR BLD AUTO: 27.1 %
MAGNESIUM SERPL-MCNC: 2 MG/DL (ref 1.6–2.4)
MCH RBC QN AUTO: 31.3 PG (ref 26–34)
MCHC RBC AUTO-ENTMCNC: 35 G/DL (ref 32–36)
MCV RBC AUTO: 90 FL (ref 80–100)
MONOCYTES # BLD AUTO: 0.58 X10*3/UL (ref 0.1–1)
MONOCYTES NFR BLD AUTO: 7.3 %
NEUTROPHILS # BLD AUTO: 4.91 X10*3/UL (ref 1.2–7.7)
NEUTROPHILS NFR BLD AUTO: 61.2 %
NRBC BLD-RTO: 0 /100 WBCS (ref 0–0)
PLATELET # BLD AUTO: 231 X10*3/UL (ref 150–450)
POC APPEARANCE, URINE: CLEAR
POC BILIRUBIN, URINE: NEGATIVE
POC BLOOD, URINE: NEGATIVE
POC COLOR, URINE: YELLOW
POC GLUCOSE, URINE: NEGATIVE MG/DL
POC KETONES, URINE: NEGATIVE MG/DL
POC LEUKOCYTES, URINE: NEGATIVE
POC NITRITE,URINE: NEGATIVE
POC PH, URINE: 6 PH
POC PROTEIN, URINE: NEGATIVE MG/DL
POC SPECIFIC GRAVITY, URINE: 1.02
POC UROBILINOGEN, URINE: 0.2 EU/DL
POTASSIUM SERPL-SCNC: 3.7 MMOL/L (ref 3.5–5.3)
PROT SERPL-MCNC: 7.3 G/DL (ref 6.4–8.2)
RBC # BLD AUTO: 5.72 X10*6/UL (ref 4.5–5.9)
SODIUM SERPL-SCNC: 141 MMOL/L (ref 136–145)
WBC # BLD AUTO: 8 X10*3/UL (ref 4.4–11.3)

## 2024-10-14 PROCEDURE — 96374 THER/PROPH/DIAG INJ IV PUSH: CPT

## 2024-10-14 PROCEDURE — 2500000001 HC RX 250 WO HCPCS SELF ADMINISTERED DRUGS (ALT 637 FOR MEDICARE OP): Performed by: STUDENT IN AN ORGANIZED HEALTH CARE EDUCATION/TRAINING PROGRAM

## 2024-10-14 PROCEDURE — 99284 EMERGENCY DEPT VISIT MOD MDM: CPT | Mod: 25

## 2024-10-14 PROCEDURE — 84075 ASSAY ALKALINE PHOSPHATASE: CPT | Performed by: STUDENT IN AN ORGANIZED HEALTH CARE EDUCATION/TRAINING PROGRAM

## 2024-10-14 PROCEDURE — 83735 ASSAY OF MAGNESIUM: CPT | Performed by: STUDENT IN AN ORGANIZED HEALTH CARE EDUCATION/TRAINING PROGRAM

## 2024-10-14 PROCEDURE — 83690 ASSAY OF LIPASE: CPT | Performed by: STUDENT IN AN ORGANIZED HEALTH CARE EDUCATION/TRAINING PROGRAM

## 2024-10-14 PROCEDURE — 2550000001 HC RX 255 CONTRASTS: Performed by: STUDENT IN AN ORGANIZED HEALTH CARE EDUCATION/TRAINING PROGRAM

## 2024-10-14 PROCEDURE — 74177 CT ABD & PELVIS W/CONTRAST: CPT

## 2024-10-14 PROCEDURE — 2500000004 HC RX 250 GENERAL PHARMACY W/ HCPCS (ALT 636 FOR OP/ED): Performed by: STUDENT IN AN ORGANIZED HEALTH CARE EDUCATION/TRAINING PROGRAM

## 2024-10-14 PROCEDURE — 74177 CT ABD & PELVIS W/CONTRAST: CPT | Performed by: RADIOLOGY

## 2024-10-14 PROCEDURE — 36415 COLL VENOUS BLD VENIPUNCTURE: CPT | Performed by: STUDENT IN AN ORGANIZED HEALTH CARE EDUCATION/TRAINING PROGRAM

## 2024-10-14 PROCEDURE — 96375 TX/PRO/DX INJ NEW DRUG ADDON: CPT

## 2024-10-14 PROCEDURE — 85025 COMPLETE CBC W/AUTO DIFF WBC: CPT | Performed by: STUDENT IN AN ORGANIZED HEALTH CARE EDUCATION/TRAINING PROGRAM

## 2024-10-14 RX ORDER — FAMOTIDINE 10 MG/ML
40 INJECTION INTRAVENOUS ONCE
Status: COMPLETED | OUTPATIENT
Start: 2024-10-14 | End: 2024-10-14

## 2024-10-14 RX ORDER — MORPHINE SULFATE 4 MG/ML
4 INJECTION, SOLUTION INTRAMUSCULAR; INTRAVENOUS ONCE
Status: DISCONTINUED | OUTPATIENT
Start: 2024-10-14 | End: 2024-10-14

## 2024-10-14 RX ORDER — DROPERIDOL 2.5 MG/ML
1.25 INJECTION, SOLUTION INTRAMUSCULAR; INTRAVENOUS ONCE
Status: COMPLETED | OUTPATIENT
Start: 2024-10-14 | End: 2024-10-14

## 2024-10-14 RX ORDER — ALUMINUM HYDROXIDE, MAGNESIUM HYDROXIDE, AND SIMETHICONE 1200; 120; 1200 MG/30ML; MG/30ML; MG/30ML
30 SUSPENSION ORAL ONCE
Status: COMPLETED | OUTPATIENT
Start: 2024-10-14 | End: 2024-10-14

## 2024-10-14 RX ORDER — DICYCLOMINE HYDROCHLORIDE 10 MG/1
10 CAPSULE ORAL ONCE
Status: COMPLETED | OUTPATIENT
Start: 2024-10-14 | End: 2024-10-14

## 2024-10-14 RX ORDER — ONDANSETRON HYDROCHLORIDE 2 MG/ML
4 INJECTION, SOLUTION INTRAVENOUS ONCE
Status: COMPLETED | OUTPATIENT
Start: 2024-10-14 | End: 2024-10-14

## 2024-10-14 RX ORDER — ACETAMINOPHEN 500 MG
1000 TABLET ORAL EVERY 6 HOURS PRN
Qty: 30 TABLET | Refills: 0 | Status: SHIPPED | OUTPATIENT
Start: 2024-10-14 | End: 2024-10-24

## 2024-10-14 RX ORDER — ACETAMINOPHEN 325 MG/1
975 TABLET ORAL ONCE
Status: COMPLETED | OUTPATIENT
Start: 2024-10-14 | End: 2024-10-14

## 2024-10-14 RX ORDER — SUCRALFATE 1 G/1
1 TABLET ORAL ONCE
Status: COMPLETED | OUTPATIENT
Start: 2024-10-14 | End: 2024-10-14

## 2024-10-14 RX ORDER — FAMOTIDINE 20 MG/1
20 TABLET, FILM COATED ORAL 2 TIMES DAILY
Qty: 60 TABLET | Refills: 0 | Status: SHIPPED | OUTPATIENT
Start: 2024-10-14 | End: 2025-10-14

## 2024-10-14 RX ORDER — DICYCLOMINE HYDROCHLORIDE 20 MG/1
20 TABLET ORAL 2 TIMES DAILY
Qty: 20 TABLET | Refills: 0 | Status: SHIPPED | OUTPATIENT
Start: 2024-10-14 | End: 2024-10-24

## 2024-10-14 RX ORDER — LIDOCAINE HYDROCHLORIDE 20 MG/ML
15 SOLUTION OROPHARYNGEAL ONCE
Status: COMPLETED | OUTPATIENT
Start: 2024-10-14 | End: 2024-10-14

## 2024-10-14 RX ORDER — ONDANSETRON 4 MG/1
4 TABLET, FILM COATED ORAL EVERY 6 HOURS
Qty: 12 TABLET | Refills: 0 | Status: SHIPPED | OUTPATIENT
Start: 2024-10-14 | End: 2024-10-17

## 2024-10-14 ASSESSMENT — ENCOUNTER SYMPTOMS
FLATUS: 0
MYALGIAS: 0
FREQUENCY: 0
ABDOMINAL PAIN: 1
BELCHING: 0
VOMITING: 0
FEVER: 1
ANOREXIA: 0
DIARRHEA: 0
HEADACHES: 0
DYSURIA: 0
CONSTIPATION: 0
ARTHRALGIAS: 0

## 2024-10-14 ASSESSMENT — CROHNS DISEASE ACTIVITY INDEX (CDAI): CDAI SCORE: 0

## 2024-10-14 ASSESSMENT — PAIN SCALES - GENERAL
PAINLEVEL: 4
PAINLEVEL_OUTOF10: 3
PAINLEVEL_OUTOF10: 5 - MODERATE PAIN
PAINLEVEL_OUTOF10: 5 - MODERATE PAIN

## 2024-10-14 ASSESSMENT — PAIN - FUNCTIONAL ASSESSMENT
PAIN_FUNCTIONAL_ASSESSMENT: 0-10

## 2024-10-14 ASSESSMENT — PAIN DESCRIPTION - LOCATION: LOCATION: ABDOMEN

## 2024-10-14 ASSESSMENT — PAIN DESCRIPTION - PAIN TYPE: TYPE: ACUTE PAIN

## 2024-10-14 ASSESSMENT — COLUMBIA-SUICIDE SEVERITY RATING SCALE - C-SSRS
6. HAVE YOU EVER DONE ANYTHING, STARTED TO DO ANYTHING, OR PREPARED TO DO ANYTHING TO END YOUR LIFE?: NO
1. IN THE PAST MONTH, HAVE YOU WISHED YOU WERE DEAD OR WISHED YOU COULD GO TO SLEEP AND NOT WAKE UP?: NO
2. HAVE YOU ACTUALLY HAD ANY THOUGHTS OF KILLING YOURSELF?: NO

## 2024-10-14 ASSESSMENT — PAIN DESCRIPTION - ORIENTATION: ORIENTATION: RIGHT;LOWER

## 2024-10-14 NOTE — DISCHARGE INSTRUCTIONS
Tylenol (acetaminophen) Warning: Some medications you have been given today contain Tylenol (acetaminophen).  Do not take more than 4,000mg of tylenol (acetaminophen) in any 24 hour period. Do not take other medications which contain Tylenol (acetaminophen).  Many common over the counter cold and pain medications include Tylenol (acetaminophen) as an ingredient.  Please be very careful, and if you are unsure ask your doctor or pharmacist.

## 2024-10-14 NOTE — ED PROVIDER NOTES
Emergency Department Provider Note        History of Present Illness     Chief Complaint: Abdominal Pain   History provided by: Patient  Limitations to History: None  External Chart Review: See ED Course    HPI:  Dany Mayorga is a 29 y.o. male presenting to the ED for right lower quadrant abdominal pain.  Patient reports that around 1 AM he woke up with a sharp mid abdominal pain.  The pain has since migrated to his right lower quadrant.  Has been associated with nausea but no vomiting or diarrhea.  Denies dysuria, urinary frequency or urgency.  Endorses subjective fevers.  Denies rhinorrhea, nasal congestion, cough, chest pain, shortness of breath, testicular pain.      Physical Exam   Triage vitals:  T 36.4 °C (97.5 °F)  HR 82  /84  RR 18  O2 98 % None (Room air)    Constitutional: Awake, alert, not in acute distress  HENT: Head atraumatic, mucous membranes moist  Eyes:  Conjunctivae normal  Neck:  Supple  Lungs: Clear to auscultation, breath sounds equal and symmetric, no wheezes, rales, or rhonchi  Heart: Regular rate and rhythm, no murmur, rub or gallop  Abdomen: Soft,, nondistended, no rebound or guarding, tender to palpation in the right lower quadrant at McBurney's point, negative Rovsing's  Extremities: No lower extremity edema  Neuro: Alert, no focal deficit  Skin: Warm, dry  Psych: Calm, cooperative       Medical Decision Making & ED Course   Medical Decision Making:  Dany Mayorga is a 29 y.o. male who presented to the ED for right lower quadrant abdominal pain. Patient was afebrile, hemodynamically stable, and satting on room air on arrival.     Exam as above.    CT abdomen/pelvis negative for acute intra-abdominal pathology.    Labs below in ED course, notable for CBC: No leukocytosis, mild polycythemia, BMP: no clinically significant electrolyte abnormalities, no KM LFT: no evidence of obstructive biliary pathology, no evidence of acute liver injury, no evidence of pancreatitis,  UA: From urgent care not suggestive of UTI.    He was treated with multimodal symptom control.  Given p.o. fluid for rehydration given IV fluid shortage.    On re-examination, they are well-appearing and have remained hemodynamically stable. They are  ambulating without difficulty and tolerating PO. They are appropriate for discharge at this time. They will follow up with PCP.  New prescriptions for discharge below. Return precautions were reviewed. Plan was discussed with  patient and they are agreeable.  ------------------------------------------------  Independent Test Interpretation: See ED Course      ED Course:  ED Course as of 10/14/24 2335   Mon Oct 14, 2024   1204 External chart review:  Urgent care office visit today  Sent to ED for RLQ abd pain     [SS]   1212 External chart review:  UA today No signs of UTI       [SS]   1242 CBC and Auto Differential(!)  Polycythemia, no leukocytosis or thrombocytopenia [SS]   1309 Comprehensive metabolic panel(!)  Mild elevation alk phos, otherwise no signs of acute liver injury, no clinically significant electrolyte abnormalities, no MK [SS]   1309 LIPASE: 30  Normal [SS]   1309 MAGNESIUM: 2.00  Normal [SS]   1434 CT abdomen pelvis w IV contrast  Radiology read:  IMPRESSION:  1.  No acute findings of the abdomen or pelvis.  2. Unremarkable appendix.   [SS]      ED Course User Index  [SS] Steffen Simerlink, MD         Diagnoses as of 10/14/24 2335   RLQ abdominal pain     Disposition   As a result of the work-up, the patient was discharged home.  he was informed of his diagnosis and instructed to come back with any concerns or worsening of condition.  he and was agreeable to the plan as discussed above.  he was given the opportunity to ask questions.  All of the patient's questions were answered.    ED Prescriptions       Medication Sig Dispense Start Date End Date Auth. Provider    acetaminophen (Tylenol) 500 mg tablet Take 2 tablets (1,000 mg) by mouth every 6 hours  if needed for mild pain (1 - 3) for up to 10 days. 30 tablet 10/14/2024 10/24/2024 Steffen Simerlink, MD    famotidine (Pepcid) 20 mg tablet Take 1 tablet (20 mg) by mouth 2 times a day. 60 tablet 10/14/2024 10/14/2025 Steffen Simerlink, MD    dicyclomine (Bentyl) 20 mg tablet Take 1 tablet (20 mg) by mouth 2 times a day for 10 days. 20 tablet 10/14/2024 10/24/2024 Steffen Simerlink, MD    ondansetron (Zofran) 4 mg tablet Take 1 tablet (4 mg) by mouth every 6 hours for 3 days. 12 tablet 10/14/2024 10/17/2024 Steffen Simerlink, MD            [facility]    Steffen Simerlink, MD Steffen Simerlink, MD  10/14/24 [id number]
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
## 2024-10-14 NOTE — PROGRESS NOTES
Did you find how to do it Subjective   Patient ID: Dany Mayorga is a 29 y.o. male. They present today with a chief complaint of Abdominal Pain (Near umbilical area nausea slight diarrhea started today felt a stabbing pain earlier today  ).    History of Present Illness  29-year-old  with acute onset of periumbilical pain that woke him up at 1 AM sharp stabbing since then radiating to the right side localized dull 3-4/10 associated nausea, dizziness subjective fever.  Bowel movement initially hard followed by diarrhea today last meal at 8 AM a few pretzels.  No flank pain.  No dysuria urgency or frequency.  No personal history of renal calculi.  No prior abdominal surgery.      Abdominal Pain  This is a new problem. The current episode started today. The onset quality is sudden. The problem occurs constantly. The most recent episode lasted 11 hours. The problem has been unchanged. The pain is located in the RLQ and epigastric region. The pain is at a severity of 4/10. The pain is moderate. The quality of the pain is dull. The abdominal pain radiates to the RLQ. Associated symptoms include a fever. Pertinent negatives include no anorexia, arthralgias, belching, constipation, diarrhea, dysuria, flatus, frequency, headaches, myalgias or vomiting. The pain is aggravated by movement. The pain is relieved by Nothing. His past medical history is significant for irritable bowel syndrome. There is no history of abdominal surgery, colon cancer, Crohn's disease, gallstones, GERD, pancreatitis, PUD or ulcerative colitis.       Past Medical History  Allergies as of 10/14/2024 - Reviewed 10/14/2024   Allergen Reaction Noted    Bee venom protein (honey bee) Hives 08/29/2024    Other Other 02/21/2013    Sulfa (sulfonamide antibiotics) Other 05/26/2023       (Not in a hospital admission)       Past Medical History:   Diagnosis Date    Anxiety     Cervical disc disorder 10/29/2020    Chronic ethmoidal  The patient is a 44y Female complaining of sinusitis     Chronic maxillary sinusitis     Chronic pain disorder 10/30/2020    Class 1 obesity with body mass index (BMI) of 32.0 to 32.9 in adult 04/02/2024    Depression 2009    Deviated septum     Extremity pain 10/29/2020    HTN (hypertension)     Migraine 2014    Nasal congestion     Nasal turbinate hypertrophy     Neck pain 10/29/2020    Personal history of other specified conditions 12/08/2021    History of fever    Right lower quadrant pain 12/08/2021    Abdominal pain, acute, right lower quadrant       Past Surgical History:   Procedure Laterality Date    COLONOSCOPY      EPIDURAL BLOCK INJECTION  July 2023    NASAL SEPTOPLASTY W/ TURBINOPLASTY Bilateral 04/09/2024    SINUSOTOMY Bilateral 04/09/2024    WISDOM TOOTH EXTRACTION          reports that he has never smoked. He has never been exposed to tobacco smoke. He has never used smokeless tobacco. He reports current alcohol use of about 1.0 - 2.0 standard drink of alcohol per week. He reports that he does not use drugs.    Review of Systems  Review of Systems   Constitutional:  Positive for fever.   Gastrointestinal:  Positive for abdominal pain. Negative for anorexia, constipation, diarrhea, flatus and vomiting.   Genitourinary:  Negative for dysuria and frequency.   Musculoskeletal:  Negative for arthralgias and myalgias.   Neurological:  Negative for headaches.                                  Objective    Vitals:    10/14/24 1106   BP: 129/82   Pulse: 97   Resp: 18   Temp: 36.4 °C (97.6 °F)   SpO2: 96%   Weight: 95.3 kg (210 lb)     No LMP for male patient.    Physical Exam  Vitals and nursing note reviewed.   Constitutional:       General: He is not in acute distress.     Appearance: Normal appearance. He is not ill-appearing, toxic-appearing or diaphoretic.   HENT:      Mouth/Throat:      Mouth: Mucous membranes are moist.      Pharynx: No oropharyngeal exudate or posterior oropharyngeal erythema.   Cardiovascular:      Rate and Rhythm: Normal rate  and regular rhythm.      Pulses: Normal pulses.      Heart sounds: Normal heart sounds.   Pulmonary:      Effort: Pulmonary effort is normal.      Breath sounds: Normal breath sounds.   Abdominal:      General: Bowel sounds are normal.      Palpations: Abdomen is soft.      Tenderness: There is abdominal tenderness (RLQ). There is rebound (RLQ).   Neurological:      Mental Status: He is alert.   Psychiatric:         Mood and Affect: Mood normal.         Behavior: Behavior normal.         Procedures    Point of Care Test & Imaging Results from this visit  Results for orders placed or performed in visit on 10/14/24   POCT UA Automated manually resulted   Result Value Ref Range    POC Color, Urine Yellow Straw, Yellow, Light-Yellow    POC Appearance, Urine Clear Clear    POC Glucose, Urine NEGATIVE NEGATIVE mg/dl    POC Bilirubin, Urine NEGATIVE NEGATIVE    POC Ketones, Urine NEGATIVE NEGATIVE mg/dl    POC Specific Gravity, Urine 1.020 1.005 - 1.035    POC Blood, Urine NEGATIVE NEGATIVE    POC PH, Urine 6.0 No Reference Range Established PH    POC Protein, Urine NEGATIVE NEGATIVE, 30 (1+) mg/dl    POC Urobilinogen, Urine 0.2 0.2, 1.0 EU/DL    Poc Nitrite, Urine NEGATIVE NEGATIVE    POC Leukocytes, Urine NEGATIVE NEGATIVE      No results found.    Diagnostic study results (if any) were reviewed by Dena Bah.    Assessment/Plan   Allergies, medications, history, and pertinent labs/EKGs/Imaging reviewed by Dena Bah.     Medical Decision Making  29-year-old with right lower quadrant abdominal pain, clinically concerning for acute appendicitis, urine negative, advised to remain Nil by mouth and proceed to [facility] emergency department for evaluation with additional test imaging studies.  Report called to the ER.    Orders and Diagnoses  Diagnoses and all orders for this visit:  Right lower quadrant abdominal pain  -     POCT UA Automated manually resulted      Medical Admin Record      Patient disposition:  ED    Electronically signed by Dena Bah  12:41 PM       The patient is a 44y Female complaining of H/A

## 2024-10-14 NOTE — ED TRIAGE NOTES
Pt arrives to ED for abdominal pain starting this morning around 0100. Pt states he had pain mid low pain that radiated over right and is now in the RLQ. Pt still has his appendix. Pt denies fevers but feels feverish. Pt reports he was seen at urgent care and was instructed to come to ED for possible appendicitis.

## 2024-10-28 ENCOUNTER — APPOINTMENT (OUTPATIENT)
Dept: OTOLARYNGOLOGY | Facility: CLINIC | Age: 29
End: 2024-10-28
Payer: COMMERCIAL

## 2024-11-21 DIAGNOSIS — I10 ESSENTIAL HYPERTENSION: ICD-10-CM

## 2024-11-21 RX ORDER — VERAPAMIL HCL 240 MG
240 TABLET, EXTENDED RELEASE ORAL NIGHTLY
Qty: 90 TABLET | Refills: 0 | Status: SHIPPED | OUTPATIENT
Start: 2024-11-21 | End: 2025-05-20

## 2024-12-02 ENCOUNTER — APPOINTMENT (OUTPATIENT)
Dept: OTOLARYNGOLOGY | Facility: CLINIC | Age: 29
End: 2024-12-02
Payer: COMMERCIAL

## 2024-12-02 VITALS — BODY MASS INDEX: 31.93 KG/M2 | WEIGHT: 210 LBS

## 2024-12-02 DIAGNOSIS — J30.9 ALLERGIC RHINITIS, UNSPECIFIED SEASONALITY, UNSPECIFIED TRIGGER: ICD-10-CM

## 2024-12-02 DIAGNOSIS — J33.0 POLYP OF NASAL CAVITY: ICD-10-CM

## 2024-12-02 DIAGNOSIS — H69.90 DYSFUNCTION OF EUSTACHIAN TUBE, UNSPECIFIED LATERALITY: ICD-10-CM

## 2024-12-02 DIAGNOSIS — J32.9 CHRONIC SINUSITIS, UNSPECIFIED LOCATION: Primary | ICD-10-CM

## 2024-12-02 DIAGNOSIS — Z98.890 HISTORY OF SINUS SURGERY: ICD-10-CM

## 2024-12-02 PROCEDURE — 99214 OFFICE O/P EST MOD 30 MIN: CPT | Performed by: GENERAL PRACTICE

## 2024-12-02 PROCEDURE — 1036F TOBACCO NON-USER: CPT | Performed by: GENERAL PRACTICE

## 2024-12-02 RX ORDER — PREDNISONE 20 MG/1
TABLET ORAL
Qty: 21 TABLET | Refills: 0 | Status: SHIPPED | OUTPATIENT
Start: 2024-12-02

## 2024-12-02 RX ORDER — BUDESONIDE 0.5 MG/2ML
0.5 INHALANT ORAL 2 TIMES DAILY
Qty: 60 ML | Refills: 2 | Status: SHIPPED | OUTPATIENT
Start: 2024-12-02 | End: 2025-12-02

## 2024-12-02 RX ORDER — AMOXICILLIN 875 MG/1
875 TABLET, FILM COATED ORAL 2 TIMES DAILY
Qty: 20 TABLET | Refills: 0 | Status: SHIPPED | OUTPATIENT
Start: 2024-12-02 | End: 2024-12-12

## 2024-12-02 RX ORDER — MUPIROCIN 20 MG/G
OINTMENT TOPICAL 2 TIMES DAILY
Qty: 22 G | Refills: 2 | Status: SHIPPED | OUTPATIENT
Start: 2024-12-02 | End: 2024-12-16

## 2024-12-02 NOTE — PROGRESS NOTES
Otolaryngology - Head and Neck Surgery Outpatient New Patient Visit Note        Assessment/Plan:   Problem List Items Addressed This Visit             ICD-10-CM       ENT    Allergic rhinitis J30.9    Relevant Medications    predniSONE (Deltasone) 20 mg tablet    Other Relevant Orders    Referral to Allergy    Chronic sinusitis - Primary J32.9     Other Visit Diagnoses         Codes    Polyp of nasal cavity     J33.0    Relevant Medications    mupirocin (Bactroban) 2 % ointment    budesonide (Pulmicort) 0.5 mg/2 mL nebulizer solution    predniSONE (Deltasone) 20 mg tablet    amoxicillin (Amoxil) 875 mg tablet    Dysfunction of Eustachian tube, unspecified laterality     H69.90    Relevant Medications    predniSONE (Deltasone) 20 mg tablet    amoxicillin (Amoxil) 875 mg tablet    Other Relevant Orders    Referral to Audiology    History of sinus surgery     Z98.890            29yoM s/p prior FESS with progressive return of congestion/pressure R>L.  Polyp in right OMU on exam, without destiny purulence.  Dry irritation and some blood on left septum.      Has not been using topical steroids or allergy medications recently.  Changed insurance and lost access to Xhance, though this had been helpful while using.  Using neilmed rinse with limited effect.     Will treat for chronic sinusitis with polyp with oral antibiotic, prednisone course and medicated saline irrigations (bactroban/budesonide).    Use nasonex after 2 weeks or medicated irrigations.      Symptoms of ETD, will acquire audiogram  Will refer to allergy clinic to discuss long term allergy controls (AIT 15y ago), and consideration of immunologics such as Xolair/Dupixent/etc for polyps.          Follow up:  -plan for follow up in clinic as needed and in 1-2 months    All of the above findings, impressions, treatment planning and follow up plans were discussed with the patient who indicated understanding.  the patient was instructed to contact or return to clinic  sooner if symptoms/signs persist or worsen despite the above management.      Juan Francisco Nicole MD  Otolaryngology - Head and Neck Surgery            History Of Present Illness  Dany Mayorga is a 29 y.o. male presenting for follow up of nasal congestion.  Reports gradual increase in R>L nasal congestion/obstruction.  No sinus pain/pressure or significant rhinorrhea.  Using neilmed rinse with decreased penetration on R.   Xhance was helpful but changed insurance and lost access.   No current nasal medicated sprays, no allergy medications.      Notes some waxing/waning ear pressure and baseline hearing deficit.      Recall    Dany Mayorga is a 29 y.o. male presenting for follow up s/p septoFESS (b/l MMA, andres takedown).   Reports overall doing well.   Notes some burning irritation in nose after use of flonase and saline irrigations.   Notes some intermittent congestion and sinus pressure.   No pain or rhinorrhea.       Recall       28yoM s/p septoFESS presents for follow up.    Reports good nasal airflow and no sinus pain/pressure  Mild baseline congetion.    No purulent rhinorrhea  Decreasing need to complete   irrigations.      recall     The pt presents for postoperative evaluation s/p septoFESS (b/l MMA, andres takedown)  on DOS 4/9/24.    No acute complaints currently.  Stable postoperative course.  No fevers, chills, N/V, SOB.  No significant bleeding.  Back to regular activity levels.  No dietary restrictions.            Past Medical History  He has a past medical history of Anxiety, Cervical disc disorder (10/29/2020), Chronic ethmoidal sinusitis, Chronic maxillary sinusitis, Chronic pain disorder (10/30/2020), Class 1 obesity with body mass index (BMI) of 32.0 to 32.9 in adult (04/02/2024), Depression (2009), Deviated septum, Extremity pain (10/29/2020), HTN (hypertension), Migraine (2014), Nasal congestion, Nasal turbinate hypertrophy, Neck pain (10/29/2020), Personal history of other specified  conditions (12/08/2021), and Right lower quadrant pain (12/08/2021).    Surgical History  He has a past surgical history that includes Epidural block injection (July 2023); Colonoscopy; [place] tooth extraction; Nasal septoplasty w/ turbinoplasty (Bilateral, 04/09/2024); and Sinusotomy (Bilateral, 04/09/2024).     Social History  He reports that he has never smoked. He has never been exposed to tobacco smoke. He has never used smokeless tobacco. He reports current alcohol use of about 1.0 - 2.0 standard drink of alcohol per week. He reports that he does not use drugs.    Family History  Family History   Problem Relation Name Age of Onset    Hypertension Mother Nora Mayorga     Graves' disease Mother Nora Mayorga     Depression Mother Brandonbryan Mukesh     Paranoid behavior Father Power Mukesh     Anxiety disorder Father Power Mukesh     Migraines Father Power Mukesh     Other (hld) Father Power Mukesh     Hyperlipidemia Father Power Mukesh     Hypertension Father Power Mukesh     Mental illness Father Power Mukesh     Asthma Father Power Mukesh     Migraines Sister Sonia     Thyroid disease Sister Sonia     Depression Sister Sonia     Migraines Brother      Asperger's syndrome Brother      Depression Brother Shelton     Hypertension Brother Shelton     Mental illness Brother Shelton     Migraines Brother Shelton     GI problems Father's Brother Allen Mayorga     Arthritis Maternal Grandmother Hellen Melissa     Cancer Maternal Grandmother Hellen Melissa     Depression Maternal Grandmother Hellen Melissa     Diabetes Maternal Grandmother Hellen Melissa     Thyroid disease Maternal Grandfather Jeremiah Melissa     Other (bca) Maternal Grandfather Jeremiah Melissa     Diabetes type II Maternal Grandfather Jeremiah Melissa     Muscular dystrophy Maternal Grandfather Jeremiah Melissa     Cancer Maternal Grandfather Jeremiah Melissa     Diabetes Maternal Grandfather Jeremiah Melissa     Muscular dystrophy Paternal Grandfather Cal Mayorga      Melanoma Paternal Grandfather Cal Mayorga     Early natural death Paternal Grandfather Cal Mayorga     GI problems Paternal Grandfather Cal Mayorga         Allergies  Bee venom protein (honey bee), Other, and Sulfa (sulfonamide antibiotics)    Review of Systems  ROS: Pertinent positives as noted in HPI.    - CONSTITUTIONAL: Does not report weight loss, fever or chills.    - HEENT:   Ear: Does not report tinnitus, vertigo, hearing loss, otalgia, otorrhea  Nose: Does not report  , rhinorrhea, epistaxis, decreased smell  Throat: Does not report pain, dysphagia, odynophagia  Larynx: Does not report hoarseness,  difficulty breathing, pain with speaking (odynophonia)  Neck: Does not report new masses, pain, swelling  Face: Does not report    ,  , swelling, numbness, weakness     - RESPIRATORY: Does not report SOB or cough.    - CV: Does not report palpitations or chest pain.     - GI: Does not report abdominal pain, nausea, vomiting or diarrhea.    - : Does not report dysuria or urinary frequency.    - MSK: Does not report myalgia or joint pain.    - SKIN: Does not report rash or pruritus.    - NEUROLOGICAL: Does not report headache or syncope.    - PSYCHIATRIC: Does not report recent changes in mood. Does not report anxiety or depression.         Physical Exam:     GENERAL:   Alert & Oriented to person, place and time; Normal affect and appearance. Well developed and well nourished. Conversant & cooperative with examination.     HEAD:   Normocephalic, atraumatic. No sinus tenderness to palpation. Normal parotid bilaterally. Normal facial strength.     NEUROLOGIC:   Cranial nerves II-XII grossly intact, gait WNL. Normal mood and affect.    EYES:   Extraocular movements intact. Pupils equal, round, reactive to light and accommodation. No nystagmus, no ptosis. no scleral injection.    EAR:   Normal auricle. No discomfort or TTP with manipulation.   Handheld otoscopic exam showed normal external auditory canals  bilaterally. No purulence or EAC inflammation. Minimal cerumen.   Right tympanic membrane clear and mobile without evidence of perforation, retraction or middle ear effusion.   Left tympanic membrane clear and mobile without evidence of perforation, retraction or middle ear effusion.     NOSE:   No external deformity. No external nasal lesions, lacerations, or scars. Nasal tip symmetrical with normal nasal valves.   Nasal cavity with essentially midline septum, edematous/erythematous  mucosa and turbinates. Polyp eminating from right MMA.  Dry mucoid debris with some blood in left nasal cavity on septum.  No lesions, masses, purulence      OC/OP:   Mucous membranes moist, no masses, lesions or exudates.   Normal tongue, floor of mouth, teeth, gums, lips. Normal posterior pharyngeal wall.    Normal tonsils without erythema, exudate or obvious calculi     NECK:   No neck masses or thyroid enlargement. Trachea midline. No tenderness to palpation    LYMPHATIC:   No cervical lymphadenopathy.     RESPIRATORY:   Symmetric chest elevation & no retractions. No significant hoarseness. No increased work of breathing.    CV:   No clubbing or cyanosis. No obvious edema    Skin:   No facial rashes, vesicles or lesions.     Extremities:   No gross abnormalities      Clinic Procedure        Information review:  External sources (notes, imaging, lab results) listed below personally reviewed to aid in medical decision making process.  -  -  -
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
## 2024-12-13 ENCOUNTER — CLINICAL SUPPORT (OUTPATIENT)
Dept: AUDIOLOGY | Facility: CLINIC | Age: 29
End: 2024-12-13
Payer: COMMERCIAL

## 2024-12-13 DIAGNOSIS — Z01.10 NORMAL HEARING EXAM: Primary | ICD-10-CM

## 2024-12-13 DIAGNOSIS — H69.90 DYSFUNCTION OF EUSTACHIAN TUBE, UNSPECIFIED LATERALITY: ICD-10-CM

## 2024-12-13 PROCEDURE — 92550 TYMPANOMETRY & REFLEX THRESH: CPT

## 2024-12-13 PROCEDURE — 92557 COMPREHENSIVE HEARING TEST: CPT

## 2024-12-17 ENCOUNTER — HOSPITAL ENCOUNTER (OUTPATIENT)
Dept: RADIOLOGY | Facility: HOSPITAL | Age: 29
Discharge: HOME | End: 2024-12-17
Payer: COMMERCIAL

## 2024-12-17 ENCOUNTER — OFFICE VISIT (OUTPATIENT)
Dept: GASTROENTEROLOGY | Facility: HOSPITAL | Age: 29
End: 2024-12-17
Payer: COMMERCIAL

## 2024-12-17 VITALS
OXYGEN SATURATION: 96 % | HEART RATE: 102 BPM | TEMPERATURE: 97.8 F | DIASTOLIC BLOOD PRESSURE: 82 MMHG | BODY MASS INDEX: 32.92 KG/M2 | WEIGHT: 216.5 LBS | SYSTOLIC BLOOD PRESSURE: 130 MMHG

## 2024-12-17 DIAGNOSIS — R10.31 RIGHT LOWER QUADRANT ABDOMINAL PAIN: Primary | ICD-10-CM

## 2024-12-17 DIAGNOSIS — K58.1 IRRITABLE BOWEL SYNDROME WITH CONSTIPATION: ICD-10-CM

## 2024-12-17 PROCEDURE — 3079F DIAST BP 80-89 MM HG: CPT | Performed by: NURSE PRACTITIONER

## 2024-12-17 PROCEDURE — 3075F SYST BP GE 130 - 139MM HG: CPT | Performed by: NURSE PRACTITIONER

## 2024-12-17 PROCEDURE — 99214 OFFICE O/P EST MOD 30 MIN: CPT | Performed by: NURSE PRACTITIONER

## 2024-12-17 PROCEDURE — 99204 OFFICE O/P NEW MOD 45 MIN: CPT | Performed by: NURSE PRACTITIONER

## 2024-12-17 PROCEDURE — 74018 RADEX ABDOMEN 1 VIEW: CPT

## 2024-12-17 PROCEDURE — 1036F TOBACCO NON-USER: CPT | Performed by: NURSE PRACTITIONER

## 2024-12-17 RX ORDER — DICYCLOMINE HYDROCHLORIDE 10 MG/1
10 CAPSULE ORAL
Qty: 120 CAPSULE | Refills: 11 | Status: SHIPPED | OUTPATIENT
Start: 2024-12-17 | End: 2025-12-17

## 2024-12-17 ASSESSMENT — ENCOUNTER SYMPTOMS
RESPIRATORY NEGATIVE: 1
ABDOMINAL PAIN: 1
DEPRESSION: 0
ALLERGIC/IMMUNOLOGIC NEGATIVE: 1
PSYCHIATRIC NEGATIVE: 1
ENDOCRINE NEGATIVE: 1
LOSS OF SENSATION IN FEET: 0
CARDIOVASCULAR NEGATIVE: 1
HEMATOLOGIC/LYMPHATIC NEGATIVE: 1
NEUROLOGICAL NEGATIVE: 1
OCCASIONAL FEELINGS OF UNSTEADINESS: 0
MUSCULOSKELETAL NEGATIVE: 1
CONSTITUTIONAL NEGATIVE: 1
EYES NEGATIVE: 1

## 2024-12-17 ASSESSMENT — PAIN SCALES - GENERAL: PAINLEVEL_OUTOF10: 2

## 2024-12-17 NOTE — PROGRESS NOTES
Subjective   Patient ID: Dany Mayorga is a 29 y.o. male who presents for New Patient Visit.  HPI  29-year-old male for new patient visit for evaluation of right lower quadrant pain  Medical history includes hypertension, SANTOS, IBS with constipation, migraine  10/14/2024 CT of the abdomen and pelvis showed no acute process  Normal lipase and magnesium  Alk phos 121  AST 15  ALT 27, total bili 0.5  H&H 17.9 and 51.2  4/19/2024 TSH was normal  For the last 10 years has had pain in the RLQ, was evaluated in the ER at times for it  BM; 1-2 times per week  Not complete  Did see some bright red blood with his stools to mary  Took 9 tablets of Trulance from samples and help with the pain and stooling  Fiber- fruits and vegetables daily  Water- lots   Exercise- walks a lot  Some straining  Tried miralax for 2 weeks and gave him a headache  Last BM - this am - not complete  Metamucil tried and didn't help  Colonoscopy- 2020 had inflammation per patient at  [place]- records not available      Review of Systems   Constitutional: Negative.    HENT: Negative.     Eyes: Negative.    Respiratory: Negative.     Cardiovascular: Negative.    Gastrointestinal:  Positive for abdominal pain.   Endocrine: Negative.    Genitourinary: Negative.    Musculoskeletal: Negative.    Skin: Negative.    Allergic/Immunologic: Negative.    Neurological: Negative.    Hematological: Negative.    Psychiatric/Behavioral: Negative.         Objective   Physical Exam  Constitutional:       Appearance: Normal appearance.   HENT:      Head: Normocephalic.      Nose: Nose normal.      Mouth/Throat:      Mouth: Mucous membranes are moist.   Eyes:      Pupils: Pupils are equal, round, and reactive to light.   Cardiovascular:      Rate and Rhythm: Normal rate and regular rhythm.      Pulses: Normal pulses.      Heart sounds: Normal heart sounds.   Pulmonary:      Effort: Pulmonary effort is normal.      Breath sounds: Normal breath sounds.   Abdominal:       General: Bowel sounds are normal.      Palpations: Abdomen is soft.   Musculoskeletal:         General: Normal range of motion.      Cervical back: Normal range of motion and neck supple.   Skin:     General: Skin is warm and dry.   Neurological:      Mental Status: He is alert.   Psychiatric:         Mood and Affect: Mood normal.         Assessment/Plan        Irritable bowel syndrome wit constipation- I would recommend getting an abd x-ray to assess for stool burden. Try to increase the fiber you are eating. You had a good response to the trial of Trulance that you got from your PCP after failing on miralax and fiber. I will order this and if needed get a prior authorization. Please collect the stool for fecal calprotectin to assess for inflammation in the colon and you can use dicyclomine 10 mg one before each meal and one at bedtime for abd cramping as needed.    IF you do not have a good response to the trulance or your symptoms persist then I would recommend a repeat colonoscopy as well    I will contact you with the results and determine follow up    CASSIA Delgado 12/17/24 2:52 PM

## 2024-12-17 NOTE — PATIENT INSTRUCTIONS
Irritable bowel syndrome wit constipation- I would recommend getting an abd x-ray to assess for stool burden. Try to increase the fiber you are eating. You had a good response to the trial of Trulance that you got from your PCP after failing on miralax and fiber. I will order this and if needed get a prior authorization. Please collect the stool for fecal calprotectin to assess for inflammation in the colon and you can use dicyclomine 10 mg one before each meal and one at bedtime for abd cramping as needed.    IF you do not have a good response to the trulance or your symptoms persist then I would recommend a repeat colonoscopy as well    I will contact you with the results and determine follow up

## 2024-12-19 ENCOUNTER — LAB (OUTPATIENT)
Dept: LAB | Facility: LAB | Age: 29
End: 2024-12-19
Payer: COMMERCIAL

## 2024-12-19 DIAGNOSIS — R10.31 RIGHT LOWER QUADRANT ABDOMINAL PAIN: ICD-10-CM

## 2024-12-19 DIAGNOSIS — K58.1 IRRITABLE BOWEL SYNDROME WITH CONSTIPATION: Primary | ICD-10-CM

## 2024-12-19 PROCEDURE — 83993 ASSAY FOR CALPROTECTIN FECAL: CPT

## 2024-12-22 LAB — CALPROTECTIN STL-MCNT: 14 UG/G

## 2024-12-30 ENCOUNTER — APPOINTMENT (OUTPATIENT)
Dept: PRIMARY CARE | Facility: CLINIC | Age: 29
End: 2024-12-30
Payer: COMMERCIAL

## 2024-12-30 VITALS — HEIGHT: 68 IN | WEIGHT: 217 LBS | BODY MASS INDEX: 32.89 KG/M2

## 2024-12-30 DIAGNOSIS — J02.9 SORE THROAT: ICD-10-CM

## 2024-12-30 DIAGNOSIS — Z00.00 ROUTINE GENERAL MEDICAL EXAMINATION AT A HEALTH CARE FACILITY: Primary | ICD-10-CM

## 2024-12-30 DIAGNOSIS — J06.9 VIRAL UPPER RESPIRATORY TRACT INFECTION: ICD-10-CM

## 2024-12-30 LAB — POC STREP A RESULT: NEGATIVE

## 2024-12-30 PROCEDURE — 3008F BODY MASS INDEX DOCD: CPT | Performed by: FAMILY MEDICINE

## 2024-12-30 PROCEDURE — 99395 PREV VISIT EST AGE 18-39: CPT | Performed by: FAMILY MEDICINE

## 2024-12-30 PROCEDURE — 87651 STREP A DNA AMP PROBE: CPT | Performed by: FAMILY MEDICINE

## 2024-12-30 PROCEDURE — 87637 SARSCOV2&INF A&B&RSV AMP PRB: CPT

## 2024-12-30 ASSESSMENT — ENCOUNTER SYMPTOMS
LOSS OF SENSATION IN FEET: 0
OCCASIONAL FEELINGS OF UNSTEADINESS: 0
DEPRESSION: 0

## 2024-12-31 LAB
FLUAV RNA RESP QL NAA+PROBE: NOT DETECTED
FLUBV RNA RESP QL NAA+PROBE: NOT DETECTED
RSV RNA RESP QL NAA+PROBE: NOT DETECTED
SARS-COV-2 RNA RESP QL NAA+PROBE: NOT DETECTED

## 2025-01-02 PROBLEM — J02.9 SORE THROAT: Status: ACTIVE | Noted: 2025-01-02

## 2025-01-02 PROBLEM — J06.9 VIRAL UPPER RESPIRATORY TRACT INFECTION: Status: ACTIVE | Noted: 2025-01-02

## 2025-01-02 NOTE — PROGRESS NOTES
"Subjective   Patient ID: Dany Mayorga is a 29 y.o. male who presents for Annual Exam and Sore Throat (From Saturday sore throat ).    Last Physical : __1__ Years ago     Pt's PMH, PSH, SH, FH , meds and allergies was obtained / reviewed and updated .     Dental  Visits : Y  Vision issues : N  Hearing issues : N    Immunizations : Y    Diet :  Could be better   Exercise:  Not regularly  Weight concerns : None    Alcohol: as noted in   Tobacco: as noted in   Recreational drugs :  None /as noted in       :    Metabolic screening   - Lipids   - Glucose     ==================================    Visit Vitals  Ht 1.727 m (5' 8\")   Wt 98.4 kg (217 lb)   BMI 32.99 kg/m²   Smoking Status Never   BSA 2.17 m²      =====================  Review of Systems:    Constitutional: no chills, no fever and no night sweats.     Eyes: no blurred vision and no eyesight problems.     ENT: no hearing loss, no nasal congestion, no nasal discharge, no hoarseness and no sore throat.     Cardiovascular: no chest pain, no intermittent leg claudication, no lower extremity edema, no palpitations and no syncope.     Respiratory: no cough, no shortness of breath during exertion, no shortness of breath at rest and no wheezing.     Gastrointestinal: no abdominal pain, no constipation, no blood in stools, no diarrhea, no melena, no nausea, no rectal pain and no vomiting.     Genitourinary: no dysuria, no change in urinary frequency, no urinary hesitancy and no feelings of urinary urgency.     Musculoskeletal: painIntegumentary: no new skin lesions and no rashes.     Neurological: headache  Psychiatric: no anxiety, no depression, no anhedonia and no substance use disorders.     Endocrine: no recent weight gain and no recent weight loss.     Hematologic/Lymphatic: no tendency for easy bruising and no swollen glands.          All other systems have been reviewed and are negative for complaint.  "   =====================================================    Physical exam :    Constitutional: Alert and in no acute distress. Well developed, well nourished.     Eyes: Normal external exam. Pupils were equal in size, round, reactive to light (PERRL) with normal accommodation and extraocular movements intact (EOMI).     Ears, Nose, Mouth, and Throat: External inspection of ears and nose: Normal.  Otoscopic examination: Normal.      Neck: No neck mass was observed. Supple.     Cardiovascular: Heart rate and rhythm were normal, normal S1 and S2, no gallops, no murmurs and no pericardial rub    Pulmonary: No respiratory distress. Clear bilateral breath sounds.     Abdomen: Soft nontender; no abdominal mass palpated. No organomegaly.     Musculoskeletal: No joint swelling seen, normal movements of all extremities. Range of motion: Normal.  Muscle strength/tone: Normal.      Skin: Normal skin color and pigmentation, normal skin turgor, and no rash.     Neurologic: Deep tendon reflexes were 2+ and symmetric. Sensation: Normal.     Psychiatric: Judgment and insight: Intact. Mood and affect: Normal.    Lymphatic : Cervical/ axillary/ groin Lns Palpable/ non palpable       Assessment/Plan    Problem List Items Addressed This Visit       Routine general medical examination at a health care facility - Primary    Relevant Orders    Lipid Panel    CBC    TSH with reflex to Free T4 if abnormal    Hemoglobin A1C    Comprehensive Metabolic Panel    Viral upper respiratory tract infection    Relevant Orders    RSV PCR (Completed)    Sars-CoV-2 and Influenza A/B PCR (Completed)    Sore throat    Relevant Orders    POCT NOW STREP A manually resulted (Completed)    RSV PCR (Completed)    Sars-CoV-2 and Influenza A/B PCR (Completed)

## 2025-01-03 ENCOUNTER — LAB (OUTPATIENT)
Dept: LAB | Facility: LAB | Age: 30
End: 2025-01-03
Payer: COMMERCIAL

## 2025-01-03 DIAGNOSIS — Z00.00 ROUTINE GENERAL MEDICAL EXAMINATION AT A HEALTH CARE FACILITY: ICD-10-CM

## 2025-01-03 LAB
ALBUMIN SERPL BCP-MCNC: 4.6 G/DL (ref 3.4–5)
ALP SERPL-CCNC: 117 U/L (ref 33–120)
ALT SERPL W P-5'-P-CCNC: 27 U/L (ref 10–52)
ANION GAP SERPL CALC-SCNC: 11 MMOL/L (ref 10–20)
AST SERPL W P-5'-P-CCNC: 15 U/L (ref 9–39)
BILIRUB SERPL-MCNC: 0.5 MG/DL (ref 0–1.2)
BUN SERPL-MCNC: 15 MG/DL (ref 6–23)
CALCIUM SERPL-MCNC: 9.2 MG/DL (ref 8.6–10.6)
CHLORIDE SERPL-SCNC: 109 MMOL/L (ref 98–107)
CHOLEST SERPL-MCNC: 154 MG/DL (ref 0–199)
CHOLESTEROL/HDL RATIO: 3.9
CO2 SERPL-SCNC: 27 MMOL/L (ref 21–32)
CREAT SERPL-MCNC: 0.9 MG/DL (ref 0.5–1.3)
EGFRCR SERPLBLD CKD-EPI 2021: >90 ML/MIN/1.73M*2
ERYTHROCYTE [DISTWIDTH] IN BLOOD BY AUTOMATED COUNT: 11.9 % (ref 11.5–14.5)
EST. AVERAGE GLUCOSE BLD GHB EST-MCNC: 85 MG/DL
GLUCOSE SERPL-MCNC: 94 MG/DL (ref 74–99)
HBA1C MFR BLD: 4.6 %
HCT VFR BLD AUTO: 48.4 % (ref 41–52)
HDLC SERPL-MCNC: 39.3 MG/DL
HGB BLD-MCNC: 16.9 G/DL (ref 13.5–17.5)
LDLC SERPL CALC-MCNC: 100 MG/DL
MCH RBC QN AUTO: 30.9 PG (ref 26–34)
MCHC RBC AUTO-ENTMCNC: 34.9 G/DL (ref 32–36)
MCV RBC AUTO: 89 FL (ref 80–100)
NON HDL CHOLESTEROL: 115 MG/DL (ref 0–149)
NRBC BLD-RTO: 0 /100 WBCS (ref 0–0)
PLATELET # BLD AUTO: 250 X10*3/UL (ref 150–450)
POTASSIUM SERPL-SCNC: 4.4 MMOL/L (ref 3.5–5.3)
PROT SERPL-MCNC: 6.5 G/DL (ref 6.4–8.2)
RBC # BLD AUTO: 5.47 X10*6/UL (ref 4.5–5.9)
SODIUM SERPL-SCNC: 143 MMOL/L (ref 136–145)
TRIGL SERPL-MCNC: 72 MG/DL (ref 0–149)
TSH SERPL-ACNC: 1.01 MIU/L (ref 0.44–3.98)
VLDL: 14 MG/DL (ref 0–40)
WBC # BLD AUTO: 6 X10*3/UL (ref 4.4–11.3)

## 2025-01-03 PROCEDURE — 84443 ASSAY THYROID STIM HORMONE: CPT

## 2025-01-03 PROCEDURE — 80053 COMPREHEN METABOLIC PANEL: CPT

## 2025-01-03 PROCEDURE — 85027 COMPLETE CBC AUTOMATED: CPT

## 2025-01-03 PROCEDURE — 80061 LIPID PANEL: CPT

## 2025-01-03 PROCEDURE — 83036 HEMOGLOBIN GLYCOSYLATED A1C: CPT

## 2025-01-21 DIAGNOSIS — I10 ESSENTIAL HYPERTENSION: ICD-10-CM

## 2025-01-21 RX ORDER — VERAPAMIL HCL 240 MG
240 TABLET, EXTENDED RELEASE ORAL NIGHTLY
Qty: 90 TABLET | Refills: 0 | Status: SHIPPED | OUTPATIENT
Start: 2025-01-21 | End: 2025-07-20

## 2025-01-27 ENCOUNTER — APPOINTMENT (OUTPATIENT)
Dept: GASTROENTEROLOGY | Facility: HOSPITAL | Age: 30
End: 2025-01-27
Payer: COMMERCIAL

## 2025-01-31 ENCOUNTER — APPOINTMENT (OUTPATIENT)
Dept: PRIMARY CARE | Facility: CLINIC | Age: 30
End: 2025-01-31
Payer: COMMERCIAL

## 2025-02-03 ENCOUNTER — APPOINTMENT (OUTPATIENT)
Dept: OTOLARYNGOLOGY | Facility: CLINIC | Age: 30
End: 2025-02-03
Payer: COMMERCIAL

## 2025-02-03 ENCOUNTER — APPOINTMENT (OUTPATIENT)
Dept: PRIMARY CARE | Facility: CLINIC | Age: 30
End: 2025-02-03
Payer: COMMERCIAL

## 2025-02-03 VITALS — WEIGHT: 210 LBS | BODY MASS INDEX: 31.93 KG/M2

## 2025-02-03 DIAGNOSIS — J32.9 CHRONIC SINUSITIS, UNSPECIFIED LOCATION: Primary | ICD-10-CM

## 2025-02-03 DIAGNOSIS — J33.9 NASAL POLYP: ICD-10-CM

## 2025-02-03 DIAGNOSIS — H69.93 DYSFUNCTION OF BOTH EUSTACHIAN TUBES: ICD-10-CM

## 2025-02-03 PROCEDURE — 99214 OFFICE O/P EST MOD 30 MIN: CPT | Performed by: GENERAL PRACTICE

## 2025-02-03 PROCEDURE — 1036F TOBACCO NON-USER: CPT | Performed by: GENERAL PRACTICE

## 2025-02-03 RX ORDER — FLUTICASONE PROPIONATE 93 UG/1
1 SPRAY, METERED NASAL 2 TIMES DAILY
Qty: 16 ML | Refills: 1 | Status: SHIPPED | OUTPATIENT
Start: 2025-02-03

## 2025-02-03 RX ORDER — MONTELUKAST SODIUM 10 MG/1
10 TABLET ORAL DAILY
Qty: 30 TABLET | Refills: 3 | Status: SHIPPED | OUTPATIENT
Start: 2025-02-03 | End: 2026-02-03

## 2025-02-03 RX ORDER — FLUTICASONE PROPIONATE 93 UG/1
1 SPRAY, METERED NASAL 2 TIMES DAILY
Qty: 6.4 ML | Refills: 0 | COMMUNITY
Start: 2025-02-03

## 2025-02-03 NOTE — PROGRESS NOTES
Otolaryngology - Head and Neck Surgery Outpatient New Patient Visit Note        Assessment/Plan:   Problem List Items Addressed This Visit             ICD-10-CM       ENT    Chronic sinusitis - Primary J32.9     Other Visit Diagnoses         Codes    Nasal polyp     J33.9            Waxing/waning congestion and sinus pressure without acute sinusitis.  Changed insurance again, would like to get back on Xhance.  Will add singulair to aid in rhinitis.    Dry rhinitis on exam, recommended mupirocin x1 week then saline gel.   Allergy eval pending in march.          Follow up:  -plan for follow up in clinic as needed and in 2-4 months    All of the above findings, impressions, treatment planning and follow up plans were discussed with the patient who indicated understanding.  the patient was instructed to contact or return to clinic sooner if symptoms/signs persist or worsen despite the above management.      Juan Francisco Nicole MD  Otolaryngology - Head and Neck Surgery            History Of Present Illness  Dany Mayorga is a 29 y.o. male with a history of FESS for CRS+NP (nasal polyp) presenting for follow up.   Reports waxing/waning congestion, sinus pressure wtihout persistent sinus pain.   Reports dry rhinitis, using saline sprays/flushes intermittently.  Reports poor effect with flonase and nasacort use.   Reports discomfort when had been using neilmed with mupirocin/bactroban.  No notable effect from antibiotic at the time.     Ongoing on/off ETD symptoms, but no hearing loss or middle ear dysfunction on recent audio 12/13/24    Recall    Dany Mayorga is a 29 y.o. male presenting for follow up of nasal congestion.  Reports gradual increase in R>L nasal congestion/obstruction.  No sinus pain/pressure or significant rhinorrhea.  Using neilmed rinse with decreased penetration on R.   Xhance was helpful but changed insurance and lost access.   No current nasal medicated sprays, no allergy medications.       Notes  some waxing/waning ear pressure and baseline hearing deficit.       Recall     Dany Mayorga is a 29 y.o. male presenting for follow up s/p septoFESS (b/l MMA, andres takedown).   Reports overall doing well.   Notes some burning irritation in nose after use of flonase and saline irrigations.   Notes some intermittent congestion and sinus pressure.   No pain or rhinorrhea.       Recall       28yoM s/p septoFESS presents for follow up.    Reports good nasal airflow and no sinus pain/pressure  Mild baseline congetion.    No purulent rhinorrhea  Decreasing need to complete   irrigations.      recall     The pt presents for postoperative evaluation s/p septoFESS (b/l MMA, andres takedown)  on DOS 4/9/24.    No acute complaints currently.  Stable postoperative course.  No fevers, chills, N/V, SOB.  No significant bleeding.  Back to regular activity levels.  No dietary restrictions.                     Past Medical History  He has a past medical history of Anxiety, Cervical disc disorder (10/29/2020), Chronic ethmoidal sinusitis, Chronic maxillary sinusitis, Chronic pain disorder (10/30/2020), Class 1 obesity with body mass index (BMI) of 32.0 to 32.9 in adult (04/02/2024), Depression (2009), Deviated septum, Extremity pain (10/29/2020), HTN (hypertension), Migraine (2014), Nasal congestion, Nasal turbinate hypertrophy, Neck pain (10/29/2020), Personal history of other specified conditions (12/08/2021), and Right lower quadrant pain (12/08/2021).    Surgical History  He has a past surgical history that includes Epidural block injection (July 2023); Colonoscopy; [place] tooth extraction; Nasal septoplasty w/ turbinoplasty (Bilateral, 04/09/2024); and Sinusotomy (Bilateral, 04/09/2024).     Social History  He reports that he has never smoked. He has never been exposed to tobacco smoke. He has never used smokeless tobacco. He reports current alcohol use of about 1.0 - 2.0 standard drink of alcohol per week. He reports that  he does not use drugs.    Family History  Family History   Problem Relation Name Age of Onset    Hypertension Mother Nora Mayorga     Graves' disease Mother Nora Mayorga     Depression Mother Nora Mayorga     Paranoid behavior Father Power Mayorga     Anxiety disorder Father Power Mayorga     Migraines Father Power Mayorga     Other (hld) Father Power Mayorga     Hyperlipidemia Father Power Mayorga     Hypertension Father Power Mayorga     Mental illness Father Power Mayorga     Asthma Father Power Mayorga     Migraines Sister Sonia     Thyroid disease Sister Sonia     Depression Sister Sonia     Migraines Brother      Asperger's syndrome Brother      Depression Brother Shelton     Hypertension Brother Shelton     Mental illness Brother Shelton     Migraines Brother Shelton     GI problems Father's Brother Allen Mayorga     Arthritis Maternal Grandmother Hellen Melissa     Cancer Maternal Grandmother Hellen Melissa     Depression Maternal Grandmother Hellen Melissa     Diabetes Maternal Grandmother Hellen Melissa     Thyroid disease Maternal Grandfather Jeremiah Melissa     Other (bca) Maternal Grandfather Jeremiah Melissa     Diabetes type II Maternal Grandfather Jeremiah Melissa     Muscular dystrophy Maternal Grandfather Jeremiah Melissa     Cancer Maternal Grandfather Jeremiah Melissa     Diabetes Maternal Grandfather Jeremiah Melissa     Muscular dystrophy Paternal Grandfather Doc Fellhauer     Melanoma Paternal Grandfather Doc Fellhauer     Early natural death Paternal Grandfather Doc Fellhauer     GI problems Paternal Grandfather Doc Fellhauer         Allergies  Bee venom protein (honey bee), Other, and Sulfa (sulfonamide antibiotics)    Review of Systems  ROS: Pertinent positives as noted in HPI.    - CONSTITUTIONAL: Does not report weight loss, fever or chills.    - HEENT:   Ear: Does not report tinnitus, vertigo, hearing loss, otalgia, otorrhea  Nose: Does not report congestion, rhinorrhea, epistaxis, decreased smell  Throat: Does not  report pain, dysphagia, odynophagia  Larynx: Does not report hoarseness,  difficulty breathing, pain with speaking (odynophonia)  Neck: Does not report new masses, pain, swelling  Face: Does not report sinus pain, pressure, swelling, numbness, weakness     - RESPIRATORY: Does not report SOB or cough.    - CV: Does not report palpitations or chest pain.     - GI: Does not report abdominal pain, nausea, vomiting or diarrhea.    - : Does not report dysuria or urinary frequency.    - MSK: Does not report myalgia or joint pain.    - SKIN: Does not report rash or pruritus.    - NEUROLOGICAL: Does not report headache or syncope.    - PSYCHIATRIC: Does not report recent changes in mood. Does not report anxiety or depression.         Physical Exam:     GENERAL:   Alert & Oriented to person, place and time; Normal affect and appearance. Well developed and well nourished. Conversant & cooperative with examination.     HEAD:   Normocephalic, atraumatic. No sinus tenderness to palpation. Normal parotid bilaterally. Normal facial strength.     NEUROLOGIC:   Cranial nerves II-XII grossly intact, gait WNL. Normal mood and affect.    EYES:   Extraocular movements intact. Pupils equal, round, reactive to light and accommodation. No nystagmus, no ptosis. no scleral injection.    EAR:   Normal auricle. No discomfort or TTP with manipulation.   Handheld otoscopic exam showed normal external auditory canals bilaterally. No purulence or EAC inflammation. Minimal cerumen.   Right tympanic membrane clear and mobile without evidence of perforation, retraction or middle ear effusion.   Left tympanic membrane clear and mobile without evidence of perforation, retraction or middle ear effusion.     NOSE:   No external deformity. No external nasal lesions, lacerations, or scars. Nasal tip symmetrical with normal nasal valves.   Nasal cavity with essentially midline septum, dry/irritated mucosa and edematous turbinates. No lesions, masses,  purulence.   Polypoid change at [facility] R>L.     OC/OP:   Mucous membranes moist, no masses, lesions or exudates.   Normal tongue, floor of mouth, teeth, gums, lips. Normal posterior pharyngeal wall.    Normal tonsils without erythema, exudate or obvious calculi     NECK:   No neck masses or thyroid enlargement. Trachea midline. No tenderness to palpation    LYMPHATIC:   No cervical lymphadenopathy.     RESPIRATORY:   Symmetric chest elevation & no retractions. No significant hoarseness. No increased work of breathing.    CV:   No clubbing or cyanosis. No obvious edema    Skin:   No facial rashes, vesicles or lesions.     Extremities:   No gross abnormalities      Clinic Procedure        Information review:  External sources (notes, imaging, lab results) listed below personally reviewed to aid in medical decision making process.  -  -  -

## 2025-02-17 ENCOUNTER — APPOINTMENT (OUTPATIENT)
Dept: PRIMARY CARE | Facility: CLINIC | Age: 30
End: 2025-02-17
Payer: COMMERCIAL

## 2025-02-17 VITALS
SYSTOLIC BLOOD PRESSURE: 125 MMHG | WEIGHT: 222 LBS | HEIGHT: 68 IN | OXYGEN SATURATION: 95 % | DIASTOLIC BLOOD PRESSURE: 79 MMHG | BODY MASS INDEX: 33.65 KG/M2 | HEART RATE: 103 BPM

## 2025-02-17 DIAGNOSIS — I10 ESSENTIAL HYPERTENSION: ICD-10-CM

## 2025-02-17 DIAGNOSIS — L21.0 SEBORRHEA CAPITIS: ICD-10-CM

## 2025-02-17 DIAGNOSIS — S16.1XXA STRAIN OF NECK MUSCLE, INITIAL ENCOUNTER: Primary | ICD-10-CM

## 2025-02-17 PROCEDURE — 3008F BODY MASS INDEX DOCD: CPT | Performed by: FAMILY MEDICINE

## 2025-02-17 PROCEDURE — 99214 OFFICE O/P EST MOD 30 MIN: CPT | Performed by: FAMILY MEDICINE

## 2025-02-17 PROCEDURE — 3074F SYST BP LT 130 MM HG: CPT | Performed by: FAMILY MEDICINE

## 2025-02-17 PROCEDURE — 3078F DIAST BP <80 MM HG: CPT | Performed by: FAMILY MEDICINE

## 2025-02-17 RX ORDER — METHYLPREDNISOLONE 4 MG/1
TABLET ORAL
Qty: 21 TABLET | Refills: 0 | Status: SHIPPED | OUTPATIENT
Start: 2025-02-17 | End: 2025-02-23

## 2025-02-17 RX ORDER — METHOCARBAMOL 750 MG/1
750 TABLET, FILM COATED ORAL 3 TIMES DAILY
Qty: 45 TABLET | Refills: 0 | Status: SHIPPED | OUTPATIENT
Start: 2025-02-17 | End: 2025-03-04

## 2025-02-17 RX ORDER — KETOCONAZOLE 20 MG/ML
SHAMPOO, SUSPENSION TOPICAL 2 TIMES WEEKLY
Qty: 120 ML | Refills: 2 | Status: SHIPPED | OUTPATIENT
Start: 2025-02-17

## 2025-02-17 RX ORDER — FLUOCINOLONE ACETONIDE 0.11 MG/ML
OIL TOPICAL
Qty: 120 ML | Refills: 0 | Status: SHIPPED | OUTPATIENT
Start: 2025-02-17

## 2025-02-17 ASSESSMENT — ENCOUNTER SYMPTOMS
LOSS OF SENSATION IN FEET: 0
OCCASIONAL FEELINGS OF UNSTEADINESS: 0
DEPRESSION: 0

## 2025-02-17 ASSESSMENT — PATIENT HEALTH QUESTIONNAIRE - PHQ9
SUM OF ALL RESPONSES TO PHQ9 QUESTIONS 1 AND 2: 0
1. LITTLE INTEREST OR PLEASURE IN DOING THINGS: NOT AT ALL
2. FEELING DOWN, DEPRESSED OR HOPELESS: NOT AT ALL

## 2025-02-19 DIAGNOSIS — I10 ESSENTIAL HYPERTENSION: ICD-10-CM

## 2025-02-19 RX ORDER — VERAPAMIL HCL 240 MG
240 TABLET, EXTENDED RELEASE ORAL NIGHTLY
Qty: 90 TABLET | Refills: 0 | Status: SHIPPED | OUTPATIENT
Start: 2025-02-19

## 2025-02-23 PROBLEM — S16.1XXA CERVICAL STRAIN: Status: ACTIVE | Noted: 2025-02-23

## 2025-02-23 PROBLEM — L21.0 SEBORRHEA CAPITIS: Status: ACTIVE | Noted: 2025-02-23
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
## 2025-02-25 ENCOUNTER — ALLIED HEALTH (OUTPATIENT)
Dept: INTEGRATIVE MEDICINE | Facility: CLINIC | Age: 30
End: 2025-02-25

## 2025-02-25 PROCEDURE — MASSG60 MASSAGE 60 MINUTES: Performed by: MASSAGE THERAPIST

## 2025-02-25 PROCEDURE — CYTAX SALES TAX CUYAHOGA COUNT: Performed by: MASSAGE THERAPIST

## 2025-02-26 NOTE — PROGRESS NOTES
Massage Therapy Visit:     Dany Mayorga was self-referred.    Condition of Client Subjective :  Patient ID: Dany Mayorga is a 29 y.o. male who presents for reason for visit of Muscle tension release and Relaxation massage.  HPI    Session Information  Visit Type: New patient  Description of present complaint: Fatigue, Range of motion (ROM), Headache, Stress, Muscle tension, Chronic pain, Discomfort    Review of Systems    Objective   Pre-treatment Assessment  Condition of Client Note: First massage session.  Tension, discomfort, felt along the neck, shoulders, back muscles.  Works as a commercial .  Dany hope to have MT help solve the pain and discomfort felt in multiple areas of the body, focusing on neck and shoulders.    Physical Exam    Actions Assessment/Plan :    Massage Treatment  Patient Position: Table, Supine, Prone  Positioning Assistance: Did not need assistance, Pillow(s)/bolster under knees while supine, Pillow(s)/bolster under anles while prone  Massage Technique: Therapeutic massage, Superficial fascial release, Stretching, Soft tissue mobilization, Relaxation massage  Area/Body Region: Whole body  Pressure Scale: 2 - Mild pressure, 3 - Medium pressure, 4 - Moderate pressure  Action Note: Upper body, kneading, stripping, palming, effleurage, petrissage, cross fiber, on Cervicals, Occipitals,  SCM, SITS, Levator Scapulae, Trapezius, Rhomboids, upper Pectoralis, Deltoids.  UE, stretching, cross fiber, palming, kneading, effleurage, on Deltoids, Biceps, Triceps, forearm muscles, hand muscles.  LE, kneading, stripping, palming, knuckle, effleurage, petrissage, cross fiber, stretching, on Vastus group, TFL, Hamstrings, Sartorius, Gracilis, Gastrocnemius, Soleus, Tibialis, plantar/dorsal aspect of foot.  Prone, kneading, stripping, palming, knuckle, effleurage, petrissage, cross fiber, stretching, on erectors, Longissimus, QL, Obliques, paraspinals, SITS, Iliocostalis,  Rhomboids, Cervicals, Trapezius.    Response:  Post-treatment Assessment  Patient Noted Improvement of the Following Symptoms: ROM, Muscle tension    Evaluation:   Massage Therapy Evaluation / Recommendation(s) / Follow-up  Post-Treatment Recommendation: Increase hydration, stretching  Patient Comments: Dany mentioned he felt relaxed after this session.  Follow-up: Follow up as needed.

## 2025-03-06 ENCOUNTER — OFFICE VISIT (OUTPATIENT)
Dept: GASTROENTEROLOGY | Facility: CLINIC | Age: 30
End: 2025-03-06
Payer: COMMERCIAL

## 2025-03-06 VITALS
WEIGHT: 218 LBS | HEIGHT: 68 IN | HEART RATE: 77 BPM | SYSTOLIC BLOOD PRESSURE: 126 MMHG | BODY MASS INDEX: 33.04 KG/M2 | TEMPERATURE: 97.5 F | DIASTOLIC BLOOD PRESSURE: 78 MMHG

## 2025-03-06 DIAGNOSIS — K58.1 IRRITABLE BOWEL SYNDROME WITH CONSTIPATION: Primary | ICD-10-CM

## 2025-03-06 PROCEDURE — 3078F DIAST BP <80 MM HG: CPT | Performed by: NURSE PRACTITIONER

## 2025-03-06 PROCEDURE — 3074F SYST BP LT 130 MM HG: CPT | Performed by: NURSE PRACTITIONER

## 2025-03-06 PROCEDURE — 99214 OFFICE O/P EST MOD 30 MIN: CPT | Performed by: NURSE PRACTITIONER

## 2025-03-06 PROCEDURE — 1036F TOBACCO NON-USER: CPT | Performed by: NURSE PRACTITIONER

## 2025-03-06 PROCEDURE — 3008F BODY MASS INDEX DOCD: CPT | Performed by: NURSE PRACTITIONER

## 2025-03-06 RX ORDER — PREDNISONE 20 MG/1
TABLET ORAL
COMMUNITY
Start: 2023-12-15

## 2025-03-06 RX ORDER — POLYETHYLENE GLYCOL 3350, SODIUM SULFATE ANHYDROUS, SODIUM BICARBONATE, SODIUM CHLORIDE, POTASSIUM CHLORIDE 236; 22.74; 6.74; 5.86; 2.97 G/4L; G/4L; G/4L; G/4L; G/4L
4 POWDER, FOR SOLUTION ORAL ONCE
Qty: 4000 ML | Refills: 0 | Status: SHIPPED | OUTPATIENT
Start: 2025-03-06 | End: 2025-03-06

## 2025-03-06 NOTE — PROGRESS NOTES
Subjective   Patient ID: Dany Mayorga is a 29 y.o. male.    HPI  29-year-old male for follow-up visit of irritable bowel syndrome with constipation  Previously seen 12/17/2020 for  At that time I recommended Trulance but he was unable to get this due to insurance lack of coverage and a co-pay of $1000 and was changed to linzess  He was started on MiraLAX and fiber  12/17/2024 abdominal x-ray showed a moderate amount of stool  12/19/2024 fecal calprotectin was 14  1/3/2025 labs reviewed normal LFTs, hemoglobin A1c, TSH and CBC  BM: more loose stool , moving 2-3 times per week, some formed stools at time, not complete        Objective   Physical Exam  Cardiovascular:      Rate and Rhythm: Normal rate and regular rhythm.      Pulses: Normal pulses.      Heart sounds: Normal heart sounds.   Pulmonary:      Effort: Pulmonary effort is normal.      Breath sounds: Normal breath sounds.   Abdominal:      General: Bowel sounds are normal.      Palpations: Abdomen is soft.         Assessment/Plan     Irritable bowel syndrome with constipation- You have seen some effect from the linzess but still getting some loose stools. I would recommend getting cleaned out with a laxative and restarting the linzess to see if it is more effective.    Please let me know how you are doing 1 week after restarting the linzess

## 2025-03-06 NOTE — PATIENT INSTRUCTIONS
Irritable bowel syndrome with constipation- You have seen some effect from the linzess but still getting some loose stools. I would recommend getting cleaned out with a laxative and restarting the linzess to see if it is more effective.    Please let me know how you are doing 1 week after restarting the linzess

## 2025-03-24 ENCOUNTER — APPOINTMENT (OUTPATIENT)
Dept: ALLERGY | Facility: CLINIC | Age: 30
End: 2025-03-24
Payer: COMMERCIAL

## 2025-03-24 VITALS
TEMPERATURE: 98.2 F | RESPIRATION RATE: 17 BRPM | WEIGHT: 225 LBS | DIASTOLIC BLOOD PRESSURE: 78 MMHG | HEART RATE: 63 BPM | HEIGHT: 68 IN | OXYGEN SATURATION: 97 % | BODY MASS INDEX: 34.1 KG/M2 | SYSTOLIC BLOOD PRESSURE: 118 MMHG

## 2025-03-24 DIAGNOSIS — R09.81 NASAL CONGESTION: ICD-10-CM

## 2025-03-24 DIAGNOSIS — J30.1 ALLERGIC RHINITIS DUE TO POLLEN, UNSPECIFIED SEASONALITY: ICD-10-CM

## 2025-03-24 DIAGNOSIS — T63.483S: ICD-10-CM

## 2025-03-24 DIAGNOSIS — L50.9 URTICARIA: ICD-10-CM

## 2025-03-24 DIAGNOSIS — J30.9 ALLERGIC RHINITIS, UNSPECIFIED SEASONALITY, UNSPECIFIED TRIGGER: ICD-10-CM

## 2025-03-24 DIAGNOSIS — J33.9 NASAL POLYPS: Primary | ICD-10-CM

## 2025-03-24 PROCEDURE — 95004 PERQ TESTS W/ALRGNC XTRCS: CPT | Performed by: ALLERGY & IMMUNOLOGY

## 2025-03-24 PROCEDURE — 99204 OFFICE O/P NEW MOD 45 MIN: CPT | Performed by: ALLERGY & IMMUNOLOGY

## 2025-03-24 RX ORDER — CARBINOXAMINE MALEATE 4 MG/1
4 TABLET ORAL 2 TIMES DAILY
Qty: 30 TABLET | Refills: 11 | Status: SHIPPED | OUTPATIENT
Start: 2025-03-24 | End: 2025-04-23

## 2025-03-24 RX ORDER — CARBINOXAMINE MALEATE 4 MG/1
4 TABLET ORAL 2 TIMES DAILY
Qty: 30 TABLET | Refills: 11 | Status: SHIPPED | OUTPATIENT
Start: 2025-03-24 | End: 2025-03-24 | Stop reason: SDUPTHER
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
## 2025-03-24 NOTE — PATIENT INSTRUCTIONS
Allergy skin tests show ragweed/weed and mold allergy:  Continue Xhance  Add Antihistamine seasonally (Carbinoxamine prescribed)      Keep humidity in the home 30-50% to decrease mold growth.  Clean bathrooms with a bleach solution and fix any leaking faucets.  You may also wipe down windowsills with  bleach solution.    For pollen allergy, keep windows closed and use central air.  You can consider wearing a hat and sunglasses as well to reduce pollen exposure.  After being outside, wash hands and face or shower to reduce the pollen on your body.  Wash clothing after wearing outside during the pollen seasons.    I will call with results of the labs  I have provided information on injectable medications for your nasal polyps. Pending labs we can determine which medication might work best for you.  Please follow up in 1-2 months.

## 2025-03-25 LAB
BASOPHILS # BLD AUTO: 58 CELLS/UL (ref 0–200)
BASOPHILS NFR BLD AUTO: 1 %
EOSINOPHIL # BLD AUTO: 70 CELLS/UL (ref 15–500)
EOSINOPHIL NFR BLD AUTO: 1.2 %
ERYTHROCYTE [DISTWIDTH] IN BLOOD BY AUTOMATED COUNT: 12.3 % (ref 11–15)
HCT VFR BLD AUTO: 49.9 % (ref 38.5–50)
HGB BLD-MCNC: 17 G/DL (ref 13.2–17.1)
HONEY BEE IGE QN: <0.1 KU/L
HONEY BEE IGE RAST: 0
LYMPHOCYTES # BLD AUTO: 2117 CELLS/UL (ref 850–3900)
LYMPHOCYTES NFR BLD AUTO: 36.5 %
MCH RBC QN AUTO: 31.5 PG (ref 27–33)
MCHC RBC AUTO-ENTMCNC: 34.1 G/DL (ref 32–36)
MCV RBC AUTO: 92.6 FL (ref 80–100)
MONOCYTES # BLD AUTO: 371 CELLS/UL (ref 200–950)
MONOCYTES NFR BLD AUTO: 6.4 %
NEUTROPHILS # BLD AUTO: 3184 CELLS/UL (ref 1500–7800)
NEUTROPHILS NFR BLD AUTO: 54.9 %
PAPER WASP IGE QN: <0.1 KU/L
PAPER WASP IGE RAST: 0
PLATELET # BLD AUTO: 207 THOUSAND/UL (ref 140–400)
PMV BLD REES-ECKER: 9.5 FL (ref 7.5–12.5)
RBC # BLD AUTO: 5.39 MILLION/UL (ref 4.2–5.8)
REF LAB TEST REF RANGE: NORMAL
WBC # BLD AUTO: 5.8 THOUSAND/UL (ref 3.8–10.8)
WHITEFACED HORNET IGE QN: <0.1 KU/L
WHITEFACED HORNET IGE RAST: 0
YELLOW HORNET IGE QN: <0.1 KU/L
YELLOW HORNET IGE RAST: 0
YELLOW JACKET IGE QN: 0.13 KU/L
YELLOW JACKET IGE RAST: ABNORMAL

## 2025-03-26 LAB — TRYPTASE SERPL-MCNC: 2.1 MCG/L

## 2025-03-26 NOTE — PROGRESS NOTES
Gastroenterology Clinic Consult Note    Reason For Consult  constipation    History Of Present Illness  Dany Mayorga is a 29 y.o. male with a past medical history of IBS predominant constipation here for evaluation.   The patient has been following with Xin Bernard for these symptoms.  He has been noticing symptoms of right lower quadrant pain and worsening constipation over the past 4-5 years.  He notes he moves his bowels at baseline a few times a week.  More recently he has been moving his bowels every 3-4 days.  He does note difficulty with straining.  The symptoms have not improved with MiraLAX as he developed headaches and did not see an improvement with his constipation.  He was  prescribed Trulance but this was not covered and more recently he was changed to Linzess with some moderate improvement of symptoms.  Abdominal x-ray in December revealed moerate retention of stool.  At his last visit, he was recommended to have a laxative cleanout to help with his symptoms.  He took a colon prep but did not notice any improvement for a few days when his bowels moved at that time.      The patient notes he has had chronic right lower quadrant pain for the past 4 to 5 years.  He notes this is a constant pain that does not change with eating or moving his bowels.  The pain does not worsen when his constipation changes.  He notes after having his elbow operated on he notes the pain improved.  He has had flares of the pain has been seen in the emergency room multiple times for this.  He has had a number of CT scans which I reviewed which have been unremarkable.  Patient did have a colonoscopy in 2020 for these symptoms which was unremarkable as well.  He does note some family members of had appendicitis before.  He notes weight gain more recently as well as no significant diarrhea or fever or chills or rashes.  The patient notes he had stopped his Linzess as this was not significantly helping his constipation and  was causing diarrhea.     Past Medical History  He has a past medical history of Anxiety, Cervical disc disorder (10/29/2020), Chronic ethmoidal sinusitis, Chronic maxillary sinusitis, Chronic pain disorder (10/30/2020), Class 1 obesity with body mass index (BMI) of 32.0 to 32.9 in adult (04/02/2024), Depression (2009), Deviated septum, Extremity pain (10/29/2020), HTN (hypertension), Migraine (2014), Nasal congestion, Nasal turbinate hypertrophy, Neck pain (10/29/2020), Personal history of other specified conditions (12/08/2021), and Right lower quadrant pain (12/08/2021).    Surgical History  He has a past surgical history that includes Epidural block injection (July 2023); Colonoscopy; [place] tooth extraction; Nasal septoplasty w/ turbinoplasty (Bilateral, 04/09/2024); and Sinusotomy (Bilateral, 04/09/2024).     Social History  He reports that he has never smoked. He has never been exposed to tobacco smoke. He has never used smokeless tobacco. He reports current alcohol use of about 1.0 - 2.0 standard drink of alcohol per week. He reports that he does not use drugs.    Family History  Family History   Problem Relation Name Age of Onset    Hypertension Mother Nora Mayorga     Graves' disease Mother Nora Mukesh     Depression Mother Nora Salmeronrafiq     Paranoid behavior Father Power Salmeronrafiq     Anxiety disorder Father Power Salmeronrafiq     Migraines Father Power Salmeronrafiq     Other (hld) Father Power Mukesh     Hyperlipidemia Father Power Salmeronrafiq     Hypertension Father Power Salmeronrafiq     Mental illness Father Power Salmeronrafiq     Asthma Father Power Salmeronrafiq     Migraines Sister Sonia     Thyroid disease Sister Sonia     Depression Sister Sonia     Migraines Brother      Asperger's syndrome Brother      Depression Brother Shelton     Hypertension Brother Shelton     Mental illness Brother Shelton     Migraines Brother Shelton     GI problems Father's Brother Allen Jaronrafiq     Arthritis Maternal  Grandmother Hellen Melissa     Cancer Maternal Grandmother Hellen Melissa     Depression Maternal Grandmother Hellen Melissa     Diabetes Maternal Grandmother Hellen Melissa     Thyroid disease Maternal Grandfather Jeremiah Melissa     Other (bca) Maternal Grandfather Jeremiah Melissa     Diabetes type II Maternal Grandfather Jeremiah Melissa     Muscular dystrophy Maternal Grandfather Jeremiah Melissa     Cancer Maternal Grandfather Jeremiah Melissa     Diabetes Maternal Grandfather Jeremiah Melissa     Muscular dystrophy Paternal Grandfather Doc Fellhauer     Melanoma Paternal Grandfather Doc Fellhauer     Early natural death Paternal Grandfather Doc Fellhauer     GI problems Paternal Grandfather Doc Fellhauer         Allergies  Bee venom protein (honey bee), Other, and Sulfa (sulfonamide antibiotics)    Home Medications  (Not in a hospital admission)      Review of Systems  Review of Systems   Constitutional:  Positive for unexpected weight change.   HENT:  Positive for congestion and sinus pressure.    Eyes: Negative.    Respiratory: Negative.     Cardiovascular: Negative.    Gastrointestinal:         As above   Endocrine: Negative.    Genitourinary: Negative.    Musculoskeletal: Negative.    Skin: Negative.    Neurological: Negative.    Psychiatric/Behavioral: Negative.           Physical Exam  General: well-nourished, no acute distress  HEENT: PERRLA, EOM intact, no scleral icterus, moist MM  Respiratory: CTA bilaterally, normal work of breathing  Cardiovascular: RRR, no murmurs/rubs/gallops  Abdomen: Soft, + right lower quadrant tenderness, nondistended, bowel sounds present, no masses palpated, no organomegaly. + carnet's sign  Extremities: no edema, no asterixis  Neuro: alert and oriented, CNII-XII grossly intact, moves all 4 extremities with no focal deficits     Last Recorded Vitals  Vitals:    03/27/25 1015   BP: 119/73   Pulse: 98   Temp: 36.7 °C (98.1 °F)   SpO2: 96%          Relevant Results    Current Outpatient Medications:     carbinoxamine maleate 4  mg tablet, Take 4 mg by mouth 2 times a day., Disp: 30 tablet, Rfl: 11    fluocinolone and shower cap 0.01 % oil, Apply to scalp 2 times a week and wash with Ketoconazole shampoo after 8 hours, Disp: 120 mL, Rfl: 0    fluticasone propionate (Xhance) 93 mcg/actuation aerosol breath activated, Administer 1 puff into affected nostril(s) 2 times a day., Disp: 6.4 mL, Rfl: 0    ketoconazole (NIZOral) 2 % shampoo, Apply topically 2 times a week., Disp: 120 mL, Rfl: 2    lamoTRIgine (LaMICtal) 100 mg tablet, Take 1 tablet (100 mg) by mouth once daily at bedtime., Disp: , Rfl:     linaCLOtide (Linzess) 290 mcg capsule, Take 1 capsule (290 mcg) by mouth once daily in the morning. Take before meals. Do not crush or chew., Disp: 30 capsule, Rfl: 11    montelukast (Singulair) 10 mg tablet, Take 1 tablet (10 mg) by mouth once daily., Disp: 30 tablet, Rfl: 3    predniSONE (Deltasone) 20 mg tablet, Take by mouth. (Patient not taking: Reported on 3/24/2025), Disp: , Rfl:     rimegepant (Nurtec ODT) 75 mg tablet,disintegrating, Take 1 tablet (75 mg) by mouth once daily as needed (headache)., Disp: 16 tablet, Rfl: 3    verapamil SR (Calan-SR) 240 mg ER tablet, TAKE 1 TABLET BY MOUTH DAILY AT BEDTIME, Disp: 90 tablet, Rfl: 0     Lab Results   Component Value Date    WBC 5.8 03/24/2025    HGB 17.0 03/24/2025    HCT 49.9 03/24/2025    MCV 92.6 03/24/2025     03/24/2025     Lab Results   Component Value Date    GLUCOSE 94 01/03/2025    CALCIUM 9.2 01/03/2025     01/03/2025    K 4.4 01/03/2025    CO2 27 01/03/2025     (H) 01/03/2025    BUN 15 01/03/2025    CREATININE 0.90 01/03/2025     Lab Results   Component Value Date    ALT 27 01/03/2025    AST 15 01/03/2025    ALKPHOS 117 01/03/2025    BILITOT 0.5 01/03/2025       Imaging:          ASSESSMENT/PLAN:    29-year-old man with chronic symptoms of constipation as well as chronic right lower quadrant pain.  The right lower quadrant pain is chronic and is not related to  his bowel movements.  On exam he does have a Carnett's sign syndrome suggesting an abdominal wall etiology.  This would go along with the multiple CT scans which have been unremarkable as well as his unremarkable colonoscopy in the past.  His symptoms of constipation may be due to outlet dysfunction versus irritable bowel syndrome or more of a colonic inertia.      Recommendations:  1.  Will give a trial of Amitiza for his constipation given his limited response to MiraLAX and Linzess.  2.  Patient can use anti-inflammatories for his abdominal pain.  Could consider abdominal wall injection depending on symptoms.    3.  Office follow-up in 6 weeks.  Could consider anorectal manometry and balloon expulsion test pending his symptoms and response to laxatives.    I spent 40 minutes in the professional and overall care of this patient.    Nishant Randall MD

## 2025-03-27 ENCOUNTER — OFFICE VISIT (OUTPATIENT)
Dept: GASTROENTEROLOGY | Facility: HOSPITAL | Age: 30
End: 2025-03-27
Payer: COMMERCIAL

## 2025-03-27 VITALS
WEIGHT: 224 LBS | TEMPERATURE: 98.1 F | BODY MASS INDEX: 34.06 KG/M2 | OXYGEN SATURATION: 96 % | DIASTOLIC BLOOD PRESSURE: 73 MMHG | SYSTOLIC BLOOD PRESSURE: 119 MMHG | HEART RATE: 98 BPM

## 2025-03-27 DIAGNOSIS — R10.31 RIGHT LOWER QUADRANT ABDOMINAL PAIN: ICD-10-CM

## 2025-03-27 DIAGNOSIS — K58.1 IRRITABLE BOWEL SYNDROME WITH CONSTIPATION: Primary | ICD-10-CM

## 2025-03-27 DIAGNOSIS — K29.70 GASTRITIS WITHOUT BLEEDING, UNSPECIFIED CHRONICITY, UNSPECIFIED GASTRITIS TYPE: ICD-10-CM

## 2025-03-27 LAB
RED OAK IGE QN: <0.35 KU/L
RED OAK IGE RAST: 0
REF LAB TEST REF RANGE: NORMAL
SILVER BIRCH IGE QN: <0.1 KU/L
SILVER BIRCH IGE RAST: 0

## 2025-03-27 PROCEDURE — 3074F SYST BP LT 130 MM HG: CPT | Performed by: INTERNAL MEDICINE

## 2025-03-27 PROCEDURE — 99214 OFFICE O/P EST MOD 30 MIN: CPT | Performed by: INTERNAL MEDICINE

## 2025-03-27 PROCEDURE — 3078F DIAST BP <80 MM HG: CPT | Performed by: INTERNAL MEDICINE

## 2025-03-27 RX ORDER — LUBIPROSTONE 8 UG/1
8 CAPSULE ORAL
Qty: 60 CAPSULE | Refills: 3 | Status: SHIPPED | OUTPATIENT
Start: 2025-03-27

## 2025-03-27 SDOH — ECONOMIC STABILITY: FOOD INSECURITY: WITHIN THE PAST 12 MONTHS, YOU WORRIED THAT YOUR FOOD WOULD RUN OUT BEFORE YOU GOT MONEY TO BUY MORE.: NEVER TRUE

## 2025-03-27 SDOH — ECONOMIC STABILITY: FOOD INSECURITY: WITHIN THE PAST 12 MONTHS, THE FOOD YOU BOUGHT JUST DIDN'T LAST AND YOU DIDN'T HAVE MONEY TO GET MORE.: NEVER TRUE

## 2025-03-27 ASSESSMENT — ENCOUNTER SYMPTOMS
CARDIOVASCULAR NEGATIVE: 1
NEUROLOGICAL NEGATIVE: 1
RESPIRATORY NEGATIVE: 1
ROS GI COMMENTS: AS ABOVE
EYES NEGATIVE: 1
UNEXPECTED WEIGHT CHANGE: 1
SINUS PRESSURE: 1
ENDOCRINE NEGATIVE: 1
PSYCHIATRIC NEGATIVE: 1
MUSCULOSKELETAL NEGATIVE: 1

## 2025-03-27 ASSESSMENT — LIFESTYLE VARIABLES
SKIP TO QUESTIONS 9-10: 1
HOW MANY STANDARD DRINKS CONTAINING ALCOHOL DO YOU HAVE ON A TYPICAL DAY: 1 OR 2
HOW OFTEN DO YOU HAVE SIX OR MORE DRINKS ON ONE OCCASION: NEVER
HOW OFTEN DO YOU HAVE A DRINK CONTAINING ALCOHOL: MONTHLY OR LESS
AUDIT-C TOTAL SCORE: 1

## 2025-03-27 ASSESSMENT — PATIENT HEALTH QUESTIONNAIRE - PHQ9
2. FEELING DOWN, DEPRESSED OR HOPELESS: NOT AT ALL
1. LITTLE INTEREST OR PLEASURE IN DOING THINGS: NOT AT ALL
SUM OF ALL RESPONSES TO PHQ9 QUESTIONS 1 & 2: 0

## 2025-03-27 ASSESSMENT — PAIN SCALES - GENERAL: PAINLEVEL_OUTOF10: 2

## 2025-03-27 NOTE — PATIENT INSTRUCTIONS
Thank you for coming in today.  I would like you to try amitiza twice daily for your constipation.  You can take an anti inflammatory medication as needed for your abdominal pain.  I would like to see you in follow up in 6 weeks to assess your symptoms.

## 2025-03-31 DIAGNOSIS — Z91.030 YELLOW JACKET STING ALLERGY: Primary | ICD-10-CM

## 2025-03-31 RX ORDER — EPINEPHRINE 0.3 MG/.3ML
0.3 INJECTION SUBCUTANEOUS AS NEEDED
Qty: 2 EACH | Refills: 1 | Status: SHIPPED | OUTPATIENT
Start: 2025-03-31 | End: 2025-04-30

## 2025-04-07 ENCOUNTER — HOSPITAL ENCOUNTER (OUTPATIENT)
Dept: RADIOLOGY | Facility: CLINIC | Age: 30
Discharge: HOME | End: 2025-04-07
Payer: COMMERCIAL

## 2025-04-07 DIAGNOSIS — R91.8 LUNG NODULES: ICD-10-CM

## 2025-04-07 PROCEDURE — 71250 CT THORAX DX C-: CPT | Performed by: RADIOLOGY

## 2025-04-07 PROCEDURE — 71250 CT THORAX DX C-: CPT

## 2025-04-18 ENCOUNTER — APPOINTMENT (OUTPATIENT)
Dept: GASTROENTEROLOGY | Facility: CLINIC | Age: 30
End: 2025-04-18
Payer: COMMERCIAL

## 2025-04-22 ENCOUNTER — TELEPHONE (OUTPATIENT)
Dept: GASTROENTEROLOGY | Facility: HOSPITAL | Age: 30
End: 2025-04-22
Payer: COMMERCIAL

## 2025-04-22 NOTE — TELEPHONE ENCOUNTER
Patient called and stated his medication for lubiprostone has not been approved by his insurance and he had given up trying to work with them and get the medication picked up and said Dr. Randall told him to call the office if there were issues.

## 2025-05-05 ENCOUNTER — APPOINTMENT (OUTPATIENT)
Dept: OTOLARYNGOLOGY | Facility: CLINIC | Age: 30
End: 2025-05-05
Payer: COMMERCIAL

## 2025-05-05 VITALS — BODY MASS INDEX: 32.69 KG/M2 | WEIGHT: 215 LBS

## 2025-05-05 DIAGNOSIS — J32.9 CHRONIC SINUSITIS, UNSPECIFIED LOCATION: ICD-10-CM

## 2025-05-05 DIAGNOSIS — J31.0 CHRONIC RHINITIS: ICD-10-CM

## 2025-05-05 DIAGNOSIS — J33.9 NASAL POLYP: ICD-10-CM

## 2025-05-05 PROCEDURE — 99214 OFFICE O/P EST MOD 30 MIN: CPT | Performed by: GENERAL PRACTICE

## 2025-05-05 PROCEDURE — 1036F TOBACCO NON-USER: CPT | Performed by: GENERAL PRACTICE

## 2025-05-05 PROCEDURE — 31231 NASAL ENDOSCOPY DX: CPT | Performed by: GENERAL PRACTICE

## 2025-05-05 RX ORDER — CEPHALEXIN 500 MG/1
500 CAPSULE ORAL 4 TIMES DAILY
Qty: 56 CAPSULE | Refills: 0 | Status: SHIPPED | OUTPATIENT
Start: 2025-05-05 | End: 2025-05-19

## 2025-05-05 RX ORDER — AZELASTINE 1 MG/ML
2 SPRAY, METERED NASAL 2 TIMES DAILY
Qty: 30 ML | Refills: 3 | Status: SHIPPED | OUTPATIENT
Start: 2025-05-05 | End: 2026-05-05

## 2025-05-05 RX ORDER — FLUTICASONE PROPIONATE 93 UG/1
1 SPRAY, METERED NASAL 2 TIMES DAILY
Qty: 6.4 ML | Refills: 0 | COMMUNITY
Start: 2025-05-05

## 2025-05-05 RX ORDER — FLUTICASONE PROPIONATE 93 UG/1
1 SPRAY, METERED NASAL 2 TIMES DAILY
Qty: 16 ML | Refills: 1 | Status: SHIPPED | OUTPATIENT
Start: 2025-05-05

## 2025-05-05 RX ORDER — PREDNISONE 20 MG/1
TABLET ORAL
Qty: 21 TABLET | Refills: 0 | Status: SHIPPED | OUTPATIENT
Start: 2025-05-05

## 2025-05-05 ASSESSMENT — PATIENT HEALTH QUESTIONNAIRE - PHQ9
2. FEELING DOWN, DEPRESSED OR HOPELESS: NOT AT ALL
1. LITTLE INTEREST OR PLEASURE IN DOING THINGS: NOT AT ALL
SUM OF ALL RESPONSES TO PHQ9 QUESTIONS 1 AND 2: 0

## 2025-05-05 NOTE — PROGRESS NOTES
Otolaryngology - Head and Neck Surgery Outpatient Established Patient Visit Note        Assessment/Plan:   Problem List Items Addressed This Visit           ICD-10-CM       ENT    Chronic sinusitis J32.9    Relevant Medications    fluticasone propionate (Xhance) 93 mcg/actuation aerosol breath activated    fluticasone propionate (Xhance) 93 mcg/actuation aerosol breath activated    fluticasone propionate (Xhance) 93 mcg/actuation aerosol breath activated    predniSONE (Deltasone) 20 mg tablet    cephalexin (Keflex) 500 mg capsule    Other Relevant Orders    CT sinus wo IV contrast     Other Visit Diagnoses         Codes      Chronic rhinitis     J31.0    Relevant Medications    azelastine (Astelin) 137 mcg (0.1 %) nasal spray      Nasal polyp     J33.9    Relevant Medications    fluticasone propionate (Xhance) 93 mcg/actuation aerosol breath activated    fluticasone propionate (Xhance) 93 mcg/actuation aerosol breath activated    fluticasone propionate (Xhance) 93 mcg/actuation aerosol breath activated    Other Relevant Orders    CT sinus wo IV contrast            29yoM with a history of CRS+NP s/p prior septoFESS in 2024, with right antrochoanal polyp obstructive on the right.  Purulent debris associated.   Will acquire treated CT sinus (keflex/prednisone) as well as treat with medicated saline irrigations (bactroban/budesonide).  Pt reports losing access to Xhance, could not get in contact with [facility].  Will re-order.  Trial astelin for allergic rhinitis.          Follow up:  -plan for follow up in clinic as needed and in 1-2 months    All of the above findings, impressions, treatment planning and follow up plans were discussed with the patient who indicated understanding.  the patient was instructed to contact or return to clinic sooner if symptoms/signs persist or worsen despite the above management.      Juan Francisco Nicole MD  Otolaryngology - Head and Neck Surgery            History Of Present Illness  Dany WEEMS  Mukesh is a 29 y.o. male with ahistory of FESS for CRS+NP presenting for follow up.  Reports overall doing well  Lost access to Xhance which had been helpful (unable to get a hold of mail order pharmacy).   Has been using PO antihistamines with some effect, but causing bothersome sedation.   Using saline sprays intermittently.   Notes congestion, PND, sinus pressure.     Considering use of biologics to aid in polyp control.      Recall  Dany Mayorga is a 29 y.o. male with a history of FESS for CRS+NP (nasal polyp) presenting for follow up.   Reports waxing/waning congestion, sinus pressure wtihout persistent sinus pain.   Reports dry rhinitis, using saline sprays/flushes intermittently.  Reports poor effect with flonase and nasacort use.   Reports discomfort when had been using neilmed with mupirocin/bactroban.  No notable effect from antibiotic at the time.      Ongoing on/off ETD symptoms, but no hearing loss or middle ear dysfunction on recent audio 12/13/24     Recall     Dany Mayorga is a 29 y.o. male presenting for follow up of nasal congestion.  Reports gradual increase in R>L nasal congestion/obstruction.  No sinus pain/pressure or significant rhinorrhea.  Using neilmed rinse with decreased penetration on R.   Xhance was helpful but changed insurance and lost access.   No current nasal medicated sprays, no allergy medications.       Notes some waxing/waning ear pressure and baseline hearing deficit.       Recall     Dany Mayorga is a 29 y.o. male presenting for follow up s/p septoFESS (b/l MMA, andres takedown).   Reports overall doing well.   Notes some burning irritation in nose after use of flonase and saline irrigations.   Notes some intermittent congestion and sinus pressure.   No pain or rhinorrhea.       Recall       28yoM s/p septoFESS presents for follow up.    Reports good nasal airflow and no sinus pain/pressure  Mild baseline congetion.    No purulent rhinorrhea  Decreasing need  to complete   irrigations.      recall     The pt presents for postoperative evaluation s/p septoFESS (b/l MMA, anders takedown)  on DOS 4/9/24.    No acute complaints currently.  Stable postoperative course.  No fevers, chills, N/V, SOB.  No significant bleeding.  Back to regular activity levels.  No dietary restrictions.        Past Medical History  He has a past medical history of Anxiety, Cervical disc disorder (10/29/2020), Chronic ethmoidal sinusitis, Chronic maxillary sinusitis, Chronic pain disorder (10/30/2020), Class 1 obesity with body mass index (BMI) of 32.0 to 32.9 in adult (04/02/2024), Depression (2009), Deviated septum, Extremity pain (10/29/2020), HTN (hypertension), Migraine (2014), Nasal congestion, Nasal turbinate hypertrophy, Neck pain (10/29/2020), Personal history of other specified conditions (12/08/2021), and Right lower quadrant pain (12/08/2021).    Surgical History  He has a past surgical history that includes Epidural block injection (July 2023); Colonoscopy; Pandora tooth extraction; Nasal septoplasty w/ turbinoplasty (Bilateral, 04/09/2024); and Sinusotomy (Bilateral, 04/09/2024).     Social History  He reports that he has never smoked. He has never been exposed to tobacco smoke. He has never used smokeless tobacco. He reports current alcohol use of about 1.0 - 2.0 standard drink of alcohol per week. He reports that he does not use drugs.    Family History  Family History[1]     Allergies  Bee venom protein (honey bee), Other, and Sulfa (sulfonamide antibiotics)    Review of Systems  ROS: Pertinent positives as noted in HPI.    - CONSTITUTIONAL: Does not report weight loss, fever or chills.    - HEENT:   Ear: Does not report tinnitus, vertigo, hearing loss, otalgia, otorrhea  Nose: Does not report  ,  , epistaxis, decreased smell  Throat: Does not report pain, dysphagia, odynophagia  Larynx: Does not report hoarseness,  difficulty breathing, pain with speaking (odynophonia)  Neck: Does  not report new masses, pain, swelling  Face: Does not report    ,  , swelling, numbness, weakness     - RESPIRATORY: Does not report SOB or cough.    - CV: Does not report palpitations or chest pain.     - GI: Does not report abdominal pain, nausea, vomiting or diarrhea.    - : Does not report dysuria or urinary frequency.    - MSK: Does not report myalgia or joint pain.    - SKIN: Does not report rash or pruritus.    - NEUROLOGICAL: Does not report headache or syncope.    - PSYCHIATRIC: Does not report recent changes in mood. Does not report anxiety or depression.         Physical Exam:     GENERAL:   Alert & Oriented to person, place and time; Normal affect and appearance. Well developed and well nourished. Conversant & cooperative with examination.     HEAD:   Normocephalic, atraumatic. No sinus tenderness to palpation. Normal parotid bilaterally. Normal facial strength.     NEUROLOGIC:   Cranial nerves II-XII grossly intact, gait WNL. Normal mood and affect.    EYES:   Extraocular movements intact. Pupils equal, round, reactive to light and accommodation. No nystagmus, no ptosis. no scleral injection.    EAR:   Normal auricle. No discomfort or TTP with manipulation.   Handheld otoscopic exam showed normal external auditory canals bilaterally. No purulence or EAC inflammation. Minimal cerumen.   Right tympanic membrane clear and mobile without evidence of perforation, retraction or middle ear effusion.   Left tympanic membrane clear and mobile without evidence of perforation, retraction or middle ear effusion.     NOSE:   No external deformity. No external nasal lesions, lacerations, or scars. Nasal tip symmetrical with normal nasal valves.   Nasal cavity with essentially midline septum, edematous mucosa and turbinates. Pale mucopurulent debris in anterior nose b/l.  Right nasal cavity obstructed by antrochoanal polyp.  Unable to see into right maxillary.  Limited ethmoid polypoid degeneration.  Left MMA and AE  patent without polyps, but with edema and pale mucoid debris scattered.      OC/OP:   Mucous membranes moist, no masses, lesions or exudates.   Normal tongue, floor of mouth, teeth, gums, lips. Normal posterior pharyngeal wall.    Normal tonsils without erythema, exudate or obvious calculi     NECK:   No neck masses or thyroid enlargement. Trachea midline. No tenderness to palpation    LYMPHATIC:   No cervical lymphadenopathy.     RESPIRATORY:   Symmetric chest elevation & no retractions. No significant hoarseness. No increased work of breathing.    CV:   No clubbing or cyanosis. No obvious edema    Skin:   No facial rashes, vesicles or lesions.     Extremities:   No gross abnormalities      Clinic Procedure    Nasal Endoscopy           Nasal Endoscopy Indication:    Risks, benefits, and alternatives, infection risk, bleeding risk and risk of mucosal trauma and pain were discussed with the patient. Verbal consent was obtained prior to the procedure and is detailed in the patient's record.     Procedure Note:      With the patient seated in the exam chair, the bilateral nasal cavity was topically treated with 4% lidocaine and phenylephrine.  The bilateral nasal cavity was intubated with the  0 degree 4mm rigid endoscope.   Nasal Endoscopy: Nasal endoscopy was successfully completed using the endoscope and the nasal mucosa, septum, turbinates, and osteomeatal complex were examined.  The spheno-ethmoid recess was examined.          Patient Status: the patient tolerated the procedure well.   Complications: there were no complications.   Significant/abnormal findings: No significant abnormal findings during the above exam, except as listed in physical exam       Information review:  External sources (notes, imaging, lab results) listed below personally reviewed to aid in medical decision making process.  -Allergy   3/24/25  -  -         [1]   Family History  Problem Relation Name Age of Onset    Hypertension Mother  Nora Salmeronrafiq     Graves' disease Mother Nora Collinslali     Depression Mother Nora Collinslali     Paranoid behavior Father Power Mayorga     Anxiety disorder Father Power Mayorga     Migraines Father Power Mayorga     Other (hld) Father Power Mayorga     Hyperlipidemia Father Power Mayorga     Hypertension Father Power Mayorga     Mental illness Father Power Mayorga     Asthma Father Power Mayorga     Migraines Sister Sonia     Thyroid disease Sister Sonia     Depression Sister Sonia     Migraines Brother      Asperger's syndrome Brother      Depression Brother Shelton     Hypertension Brother Shelton     Mental illness Brother Shelton     Migraines Brother Shelton     GI problems Father's Brother Allen Collinslali     Arthritis Maternal Grandmother Hellen Melissa     Cancer Maternal Grandmother Hellen Melissa     Depression Maternal Grandmother Hellen Melissa     Diabetes Maternal Grandmother Hellen Melissa     Thyroid disease Maternal Grandfather Jeremiah Melissa     Other (bca) Maternal Grandfather Jeremiah Melissa     Diabetes type II Maternal Grandfather Jeremiah Melissa     Muscular dystrophy Maternal Grandfather Jeremiah Melissa     Cancer Maternal Grandfather Jeremiah Melissa     Diabetes Maternal Grandfather Jeremiah Melissa     Muscular dystrophy Paternal Grandfather Doc Fellhauer     Melanoma Paternal Grandfather Doc Fellhauer     Early natural death Paternal Grandfather Doc Fellhauer     GI problems Paternal Grandfather Doc Fellhauer

## 2025-05-29 ENCOUNTER — APPOINTMENT (OUTPATIENT)
Dept: ALLERGY | Facility: CLINIC | Age: 30
End: 2025-05-29
Payer: COMMERCIAL

## 2025-05-29 VITALS
DIASTOLIC BLOOD PRESSURE: 78 MMHG | RESPIRATION RATE: 17 BRPM | OXYGEN SATURATION: 97 % | HEART RATE: 63 BPM | WEIGHT: 221 LBS | HEIGHT: 68 IN | SYSTOLIC BLOOD PRESSURE: 122 MMHG | TEMPERATURE: 98.1 F | BODY MASS INDEX: 33.49 KG/M2

## 2025-05-29 DIAGNOSIS — J32.2 CHRONIC ETHMOIDAL SINUSITIS: ICD-10-CM

## 2025-05-29 DIAGNOSIS — J33.9 NASAL POLYPS: ICD-10-CM

## 2025-05-29 DIAGNOSIS — J30.1 NON-SEASONAL ALLERGIC RHINITIS DUE TO POLLEN: Primary | ICD-10-CM

## 2025-05-29 DIAGNOSIS — J30.89 ALLERGIC RHINITIS DUE TO MOLD: ICD-10-CM

## 2025-05-29 PROCEDURE — 3074F SYST BP LT 130 MM HG: CPT | Performed by: ALLERGY & IMMUNOLOGY

## 2025-05-29 PROCEDURE — 99214 OFFICE O/P EST MOD 30 MIN: CPT | Performed by: ALLERGY & IMMUNOLOGY

## 2025-05-29 PROCEDURE — 3078F DIAST BP <80 MM HG: CPT | Performed by: ALLERGY & IMMUNOLOGY

## 2025-05-29 PROCEDURE — 96372 THER/PROPH/DIAG INJ SC/IM: CPT | Performed by: ALLERGY & IMMUNOLOGY

## 2025-05-29 PROCEDURE — 3008F BODY MASS INDEX DOCD: CPT | Performed by: ALLERGY & IMMUNOLOGY
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
## 2025-05-30 ENCOUNTER — APPOINTMENT (OUTPATIENT)
Dept: RADIOLOGY | Facility: CLINIC | Age: 30
End: 2025-05-30
Payer: COMMERCIAL

## 2025-05-30 DIAGNOSIS — J32.9 CHRONIC SINUSITIS, UNSPECIFIED LOCATION: ICD-10-CM

## 2025-05-30 DIAGNOSIS — J33.9 NASAL POLYP: ICD-10-CM

## 2025-05-30 PROCEDURE — 70486 CT MAXILLOFACIAL W/O DYE: CPT | Performed by: RADIOLOGY

## 2025-05-30 PROCEDURE — 70486 CT MAXILLOFACIAL W/O DYE: CPT

## 2025-06-01 NOTE — PROGRESS NOTES
Gastroenterology Clinic Note    History Of Present Illness  Dany Mayorga is a 29 y.o. male presenting for follow up of constipation and abdominal pain.  The patient was last seen in the office by me in March.  He has a history of chronic constipation.  He had been on Linzess with some very modest improvement of symptoms.  Insurance did not cover Trulance or Amitiza.  He noted having a bowel movement a few times per week.  Patient also had a right sided abdominal pain which did not appear to be related to his bowel irregularities.  On exam he did have a Carnett's sign  suggesting an abdominal wall source of his pain.  He did have a CT scan performed in October of last year which was unremarkable.  We had recommended a combination of Dulcolax and fiber given his lack of insurance coverage for other medication.  The patient notes having a colonoscopy in 2020 in [place] for right lower quadrant pain and inflammation noted on CT scan.  The patient notes the colonoscopy was unremarkable.  He also notes resolution of his constipation for 4-6 weeks after his arm surgery.     Subjective  The patient notes taking 1-2 Dulcolax and daily did not help his symptoms and if he increased the dosing to 3 he would have diarrhea.  This was associated with cramping.  We discussed that Amitiza was not covered by his insurance.  He is taking fiber but does not believe this is helping his symptoms much.  He does note symptoms of incomplete evacuation and difficulty evacuating the rectum.    Review of Systems  Review of Systems   Constitutional: Negative.    HENT:  Positive for sinus pressure.    Eyes: Negative.    Respiratory: Negative.     Cardiovascular: Negative.    Gastrointestinal:         As above   Endocrine: Negative.    Genitourinary: Negative.    Musculoskeletal: Negative.    Skin: Negative.          Physical Exam  Physical Exam  Vitals reviewed.   Constitutional:       Appearance: Normal appearance.   Neurological:       Mental Status: He is alert.           Last Recorded Vitals  Vitals:    06/02/25 0839   BP: 123/84   Pulse: 81   Temp: 37 °C (98.6 °F)   SpO2: 95%         Relevant Results  Current Medications[1]     Lab Results   Component Value Date    WBC 5.8 03/24/2025    HGB 17.0 03/24/2025    HCT 49.9 03/24/2025    MCV 92.6 03/24/2025     03/24/2025     Lab Results   Component Value Date    GLUCOSE 94 01/03/2025    CALCIUM 9.2 01/03/2025     01/03/2025    K 4.4 01/03/2025    CO2 27 01/03/2025     (H) 01/03/2025    BUN 15 01/03/2025    CREATININE 0.90 01/03/2025     Lab Results   Component Value Date    ALT 27 01/03/2025    AST 15 01/03/2025    ALKPHOS 117 01/03/2025    BILITOT 0.5 01/03/2025         Imaging:       ASSESSMENT/PLAN:    29-year-old man with chronic constipation likely due to IBS-C.  Also with chronic right lower quadrant pain likely due to an abdominal wall and musculoskeletal source.  He has had poor response to laxatives and does have some symptoms of incomplete evacuation and need for straining.  We should rule out dyssynergic defecation.    Recommendations:  1.  Patient can do a trial of milk of magnesia and/or senna.  2.  Will schedule patient for anorectal manometry with balloon expulsion test.  3.  I will contact him after these results and can plan on follow-up at that time.    I spent 30 minutes in the professional and overall care of this patient.      Nishant Randall MD          [1]   Current Outpatient Medications:     azelastine (Astelin) 137 mcg (0.1 %) nasal spray, Administer 2 sprays into each nostril 2 times a day. Use in each nostril as directed, Disp: 30 mL, Rfl: 3    EPINEPHrine (Epipen) 0.3 mg/0.3 mL injection syringe, Inject 0.3 mL (0.3 mg) into the muscle if needed for anaphylaxis (yellow jacket allergy). Inject into UPPER, OUTER THIGH. Call 911 after use., Disp: 2 each, Rfl: 1    fluocinolone and shower cap 0.01 % oil, Apply to scalp 2 times a week and wash with  Ketoconazole shampoo after 8 hours (Patient taking differently: if needed. Apply to scalp 2 times a week and wash with Ketoconazole shampoo after 8 hours), Disp: 120 mL, Rfl: 0    fluticasone propionate (Xhance) 93 mcg/actuation aerosol breath activated, Administer 1 puff into affected nostril(s) 2 times a day., Disp: 6.4 mL, Rfl: 0    fluticasone propionate (Xhance) 93 mcg/actuation aerosol breath activated, Administer 1 puff into affected nostril(s) 2 times a day., Disp: 16 mL, Rfl: 1    fluticasone propionate (Xhance) 93 mcg/actuation aerosol breath activated, Administer 1 puff into affected nostril(s) 2 times a day., Disp: 6.4 mL, Rfl: 0    fluticasone propionate (Xhance) 93 mcg/actuation aerosol breath activated, Administer 1 puff into affected nostril(s) 2 times a day., Disp: 6.4 mL, Rfl: 0    ketoconazole (NIZOral) 2 % shampoo, Apply topically 2 times a week. (Patient taking differently: Apply topically if needed.), Disp: 120 mL, Rfl: 2    lamoTRIgine (LaMICtal) 100 mg tablet, Take 1 tablet (100 mg) by mouth once daily at bedtime. (Patient taking differently: Take 1 tablet (100 mg) by mouth once daily at bedtime. 225 mg), Disp: , Rfl:     lubiprostone (Amitiza) 8 mcg capsule, Take 1 capsule (8 mcg) by mouth 2 times daily (morning and late afternoon)., Disp: 60 capsule, Rfl: 3    montelukast (Singulair) 10 mg tablet, Take 1 tablet (10 mg) by mouth once daily., Disp: 30 tablet, Rfl: 3    predniSONE (Deltasone) 20 mg tablet, Prednisone 40 mg daily for 7 days then take 20mg daily for 7 days, Disp: 21 tablet, Rfl: 0    rimegepant (Nurtec ODT) 75 mg tablet,disintegrating, Take 1 tablet (75 mg) by mouth once daily as needed (headache)., Disp: 16 tablet, Rfl: 3    verapamil SR (Calan-SR) 240 mg ER tablet, TAKE 1 TABLET BY MOUTH DAILY AT BEDTIME, Disp: 90 tablet, Rfl: 0

## 2025-06-02 ENCOUNTER — OFFICE VISIT (OUTPATIENT)
Dept: GASTROENTEROLOGY | Facility: HOSPITAL | Age: 30
End: 2025-06-02
Payer: COMMERCIAL

## 2025-06-02 VITALS
SYSTOLIC BLOOD PRESSURE: 123 MMHG | BODY MASS INDEX: 33.95 KG/M2 | DIASTOLIC BLOOD PRESSURE: 84 MMHG | HEART RATE: 81 BPM | WEIGHT: 223.3 LBS | OXYGEN SATURATION: 95 % | TEMPERATURE: 98.6 F

## 2025-06-02 DIAGNOSIS — K58.1 IRRITABLE BOWEL SYNDROME WITH CONSTIPATION: Primary | ICD-10-CM

## 2025-06-02 DIAGNOSIS — R10.31 RIGHT LOWER QUADRANT ABDOMINAL PAIN: ICD-10-CM

## 2025-06-02 DIAGNOSIS — K59.02 CONSTIPATION DUE TO OUTLET DYSFUNCTION: ICD-10-CM

## 2025-06-02 PROCEDURE — 3079F DIAST BP 80-89 MM HG: CPT | Performed by: INTERNAL MEDICINE

## 2025-06-02 PROCEDURE — 3074F SYST BP LT 130 MM HG: CPT | Performed by: INTERNAL MEDICINE

## 2025-06-02 PROCEDURE — 99214 OFFICE O/P EST MOD 30 MIN: CPT | Performed by: INTERNAL MEDICINE

## 2025-06-02 SDOH — ECONOMIC STABILITY: FOOD INSECURITY: WITHIN THE PAST 12 MONTHS, YOU WORRIED THAT YOUR FOOD WOULD RUN OUT BEFORE YOU GOT MONEY TO BUY MORE.: NEVER TRUE

## 2025-06-02 SDOH — ECONOMIC STABILITY: FOOD INSECURITY: WITHIN THE PAST 12 MONTHS, THE FOOD YOU BOUGHT JUST DIDN'T LAST AND YOU DIDN'T HAVE MONEY TO GET MORE.: NEVER TRUE

## 2025-06-02 ASSESSMENT — ENCOUNTER SYMPTOMS
ROS GI COMMENTS: AS ABOVE
SINUS PRESSURE: 1
CARDIOVASCULAR NEGATIVE: 1
ENDOCRINE NEGATIVE: 1
EYES NEGATIVE: 1
MUSCULOSKELETAL NEGATIVE: 1
RESPIRATORY NEGATIVE: 1
CONSTITUTIONAL NEGATIVE: 1

## 2025-06-02 ASSESSMENT — PAIN SCALES - GENERAL: PAINLEVEL_OUTOF10: 0-NO PAIN

## 2025-06-02 ASSESSMENT — LIFESTYLE VARIABLES
AUDIT-C TOTAL SCORE: 0
HOW MANY STANDARD DRINKS CONTAINING ALCOHOL DO YOU HAVE ON A TYPICAL DAY: PATIENT DOES NOT DRINK
SKIP TO QUESTIONS 9-10: 1
HOW OFTEN DO YOU HAVE SIX OR MORE DRINKS ON ONE OCCASION: NEVER
HOW OFTEN DO YOU HAVE A DRINK CONTAINING ALCOHOL: NEVER

## 2025-06-02 ASSESSMENT — PATIENT HEALTH QUESTIONNAIRE - PHQ9
SUM OF ALL RESPONSES TO PHQ9 QUESTIONS 1 & 2: 0
2. FEELING DOWN, DEPRESSED OR HOPELESS: NOT AT ALL
1. LITTLE INTEREST OR PLEASURE IN DOING THINGS: NOT AT ALL

## 2025-06-02 NOTE — PATIENT INSTRUCTIONS
Thank you for coming in today.  I would like to schedule an anorectal manometry with balloon expulsion test.  Please call to schedule this.  You can take senna or milk of magnesia in the meantime for episodes of constipation.  I can contact you after this test is completed.

## 2025-06-03 ENCOUNTER — APPOINTMENT (OUTPATIENT)
Dept: PRIMARY CARE | Facility: CLINIC | Age: 30
End: 2025-06-03
Payer: COMMERCIAL

## 2025-06-03 VITALS
HEART RATE: 84 BPM | DIASTOLIC BLOOD PRESSURE: 74 MMHG | SYSTOLIC BLOOD PRESSURE: 111 MMHG | BODY MASS INDEX: 33.65 KG/M2 | HEIGHT: 68 IN | WEIGHT: 222 LBS

## 2025-06-03 DIAGNOSIS — J30.1 ALLERGIC RHINITIS DUE TO POLLEN, UNSPECIFIED SEASONALITY: ICD-10-CM

## 2025-06-03 DIAGNOSIS — L21.0 SEBORRHEA CAPITIS: ICD-10-CM

## 2025-06-03 DIAGNOSIS — M25.529 ELBOW PAIN, UNSPECIFIED LATERALITY: Primary | ICD-10-CM

## 2025-06-03 DIAGNOSIS — I10 ESSENTIAL HYPERTENSION: ICD-10-CM

## 2025-06-03 DIAGNOSIS — G56.32 RADIAL TUNNEL SYNDROME, LEFT: ICD-10-CM

## 2025-06-03 DIAGNOSIS — M25.521 CHRONIC ELBOW PAIN, RIGHT: ICD-10-CM

## 2025-06-03 DIAGNOSIS — G56.21 CUBITAL TUNNEL SYNDROME ON RIGHT: ICD-10-CM

## 2025-06-03 DIAGNOSIS — G56.31 RADIAL TUNNEL SYNDROME OF RIGHT UPPER EXTREMITY: ICD-10-CM

## 2025-06-03 DIAGNOSIS — G89.29 CHRONIC ELBOW PAIN, RIGHT: ICD-10-CM

## 2025-06-03 PROBLEM — R52 PAIN: Status: ACTIVE | Noted: 2024-06-20

## 2025-06-03 PROBLEM — K50.00 TERMINAL ILEITIS (MULTI): Status: ACTIVE | Noted: 2025-06-03

## 2025-06-03 PROBLEM — R19.7 ACUTE DIARRHEA: Status: ACTIVE | Noted: 2025-06-03

## 2025-06-03 PROBLEM — K92.1 HEMATOCHEZIA: Status: ACTIVE | Noted: 2025-06-03

## 2025-06-03 PROCEDURE — 3074F SYST BP LT 130 MM HG: CPT | Performed by: FAMILY MEDICINE

## 2025-06-03 PROCEDURE — 3008F BODY MASS INDEX DOCD: CPT | Performed by: FAMILY MEDICINE

## 2025-06-03 PROCEDURE — 99214 OFFICE O/P EST MOD 30 MIN: CPT | Performed by: FAMILY MEDICINE

## 2025-06-03 PROCEDURE — 3078F DIAST BP <80 MM HG: CPT | Performed by: FAMILY MEDICINE

## 2025-06-03 ASSESSMENT — ENCOUNTER SYMPTOMS
DEPRESSION: 0
LOSS OF SENSATION IN FEET: 0
OCCASIONAL FEELINGS OF UNSTEADINESS: 0

## 2025-06-08 PROBLEM — G56.32 RADIAL TUNNEL SYNDROME, LEFT: Status: ACTIVE | Noted: 2025-06-08

## 2025-06-08 PROBLEM — M25.529 ELBOW PAIN: Status: ACTIVE | Noted: 2025-06-08

## 2025-06-08 RX ORDER — FLUOCINOLONE ACETONIDE 0.11 MG/ML
OIL TOPICAL
Start: 2025-06-08

## 2025-06-08 NOTE — PROGRESS NOTES
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    Overall doing well  Allergic rhinitis is on azelastine and Xhance does have been working well   states that he has had severe pain in both his elbows has history of cubital and radial tunnel syndrome in his elbows had surgery in 2 [place] was doing well but now pain has reoccurred.  Would like to see somebody at       ROS   Constitutional:  o weight loss. Negative for chills and fever.   HENT: Negative.   Allergic rhinitis  Respiratory: Negative.     Cardiovascular: Negative.    Gastrointestinal: Negative.  Negative for nausea and vomiting.   Endocrine: Negative.    Genitourinary: Negative.    Musculoskeletal: Negative.    Skin: Negative.  Negative for rash.   Allergic/Immunologic: Negative.    Neurological: Negative.  Headache  Hematological: Negative.    Psychiatric/Behavioral: Negative.     All other systems reviewed and are negative.      /74   Pulse 84   Ht 1.727 m (5' 8\")   Wt 101 kg (222 lb)   BMI 33.75 kg/m²   Body mass index is 33.75 kg/m².  Physical Exam    ENT:      Head: Normocephalic and atraumatic.      Right " Ear: Tympanic membrane normal.      Left Ear: Tympanic membrane normal.      Nose: Nose normal.      Mouth/Throat:      Mouth: Mucous membranes are moist.   Eyes:      Pupils: Pupils are equal, round, and reactive to light.   Cardiovascular:      Rate and Rhythm: Normal rate and regular rhythm.      Pulses: Normal pulses.      Heart sounds: Normal heart sounds.   Pulmonary:      Effort: Pulmonary effort is normal.      Breath sounds: Normal breath sounds.   Abdominal:      General: Abdomen is flat. Bowel sounds are normal.      Palpations: Abdomen is soft.   Musculoskeletal:         General: Normal range of motion.  Healed incisions.  Elbows     Cervical back: Normal range of motion and neck supple.   Skin:     General: Skin is warm and dry.      Capillary Refill: Capillary refill takes less than 2 seconds.   Neurological:      General: No focal deficit present.      Mental Status: She is alert and oriented to person, place, and time.   Psychiatric:         Mood and Affect: Mood normal.       Active Problem List  Problem List[1]    Comprehensive Medical/Surgical/Social/Family History  Medical History[2]  Surgical History[3]  Social History     Social History Narrative    Not on file       Allergies and Medications  Bee venom protein (honey bee), Other, and Sulfa (sulfonamide antibiotics)  Current Outpatient Medications   Medication Instructions    azelastine (Astelin) 137 mcg (0.1 %) nasal spray 2 sprays, Each Nostril, 2 times daily, Use in each nostril as directed    EPINEPHrine (EPIPEN) 0.3 mg, intramuscular, As needed, Inject into UPPER, OUTER THIGH. Call 911 after use.    fluticasone propionate (Xhance) 93 mcg/actuation aerosol breath activated 1 puff, nasal, 2 times daily    lamoTRIgine (LaMICtal) 100 mg tablet 1 tablet, Nightly    lubiprostone (AMITIZA) 8 mcg, oral, 2 times daily (morning and late afternoon)    montelukast (SINGULAIR) 10 mg, oral, Daily    rimegepant (NURTEC ODT) 75 mg, oral, Daily PRN     verapamil SR (CALAN-SR) 240 mg, oral, Nightly          [1]   Patient Active Problem List  Diagnosis    Allergic rhinitis    Essential hypertension    Generalized anxiety disorder    Migraine without aura, not refractory    Mild depression    Right cervical radiculopathy    Vitamin D deficiency    Lateral epicondylitis of right elbow    Chronic elbow pain, right    Cubital tunnel syndrome on right    Irritable bowel syndrome with constipation    Chronic sinusitis    Deviated septum    Nasal turbinate hypertrophy    Nasal congestion    Routine general medical examination at a health care facility    Toxic effect of gas exposure    Cough on exercise    Cardiomegaly    Gastritis without bleeding    Lung nodules    Inhalation injury    Migraine without status migrainosus, not intractable    Bee sting    Viral upper respiratory tract infection    Sore throat    Cervical strain    Seborrhea capitis    Acute diarrhea    Hematochezia    Pain    Radial tunnel syndrome of right upper extremity    Terminal ileitis (Multi)    Radial tunnel syndrome, left    Elbow pain   [2]   Past Medical History:  Diagnosis Date    Anxiety     Cervical disc disorder 10/29/2020    Chronic ethmoidal sinusitis     Chronic maxillary sinusitis     Chronic pain disorder 10/30/2020    Class 1 obesity with body mass index (BMI) of 32.0 to 32.9 in adult 04/02/2024    Depression 2009    Deviated septum     Extremity pain 10/29/2020    HTN (hypertension)     Migraine 2014    Nasal congestion     Nasal turbinate hypertrophy     Neck pain 10/29/2020    Personal history of other specified conditions 12/08/2021    History of fever    Right lower quadrant pain 12/08/2021    Abdominal pain, acute, right lower quadrant   [3]   Past Surgical History:  Procedure Laterality Date    COLONOSCOPY      EPIDURAL BLOCK INJECTION  July 2023    NASAL SEPTOPLASTY W/ TURBINOPLASTY Bilateral 04/09/2024    SINUSOTOMY Bilateral 04/09/2024    WISDOM TOOTH EXTRACTION

## 2025-06-09 DIAGNOSIS — J32.9 CHRONIC SINUSITIS, UNSPECIFIED LOCATION: ICD-10-CM

## 2025-06-09 DIAGNOSIS — J33.9 NASAL POLYP: ICD-10-CM

## 2025-06-09 RX ORDER — MONTELUKAST SODIUM 10 MG/1
10 TABLET ORAL DAILY
Qty: 30 TABLET | Refills: 3 | Status: SHIPPED | OUTPATIENT
Start: 2025-06-09 | End: 2026-06-09

## 2025-06-10 DIAGNOSIS — I10 ESSENTIAL HYPERTENSION: ICD-10-CM

## 2025-06-10 RX ORDER — VERAPAMIL HCL 240 MG
TABLET, EXTENDED RELEASE ORAL
Qty: 90 TABLET | Refills: 0 | Status: SHIPPED | OUTPATIENT
Start: 2025-06-10

## 2025-06-12 ENCOUNTER — APPOINTMENT (OUTPATIENT)
Dept: ALLERGY | Facility: CLINIC | Age: 30
End: 2025-06-12
Payer: COMMERCIAL

## 2025-06-16 ENCOUNTER — APPOINTMENT (OUTPATIENT)
Dept: OTOLARYNGOLOGY | Facility: CLINIC | Age: 30
End: 2025-06-16
Payer: COMMERCIAL

## 2025-06-16 VITALS — BODY MASS INDEX: 33.75 KG/M2 | WEIGHT: 222 LBS

## 2025-06-16 DIAGNOSIS — J33.9 NASAL POLYP: Primary | ICD-10-CM

## 2025-06-16 DIAGNOSIS — J32.0 CHRONIC MAXILLARY SINUSITIS: ICD-10-CM

## 2025-06-16 PROCEDURE — 99214 OFFICE O/P EST MOD 30 MIN: CPT | Performed by: GENERAL PRACTICE

## 2025-06-16 PROCEDURE — 1036F TOBACCO NON-USER: CPT | Performed by: GENERAL PRACTICE

## 2025-06-16 RX ORDER — PREDNISONE 50 MG/1
TABLET ORAL
Qty: 10 TABLET | Refills: 0 | Status: SHIPPED | OUTPATIENT
Start: 2025-06-16

## 2025-06-16 NOTE — PROGRESS NOTES
Otolaryngology - Head and Neck Surgery Outpatient Established Patient Visit Note        Assessment/Plan:   Problem List Items Addressed This Visit           ICD-10-CM       ENT    Chronic sinusitis J32.9     Other Visit Diagnoses         Codes      Nasal polyp    -  Primary J33.9    Relevant Medications    predniSONE (Deltasone) 50 mg tablet            Persistent right maxillary sinusitis with obstructive polyp.  Discussed options of observation, ongoing medical management, possible revision sinus surgery (right polypectomy and revision MMA).      Pt elects for surgical correction.   Risks, benefits and indications of the procedure were discussed.  This included discussion of risks of pain, bleeding, infection, scarring with poor functional or cosmetic result, need for future procedures, or damage to surrounding structures to include regional blood vessels and nerves.  In particular, the risk of ongoing obstruction, vision change/loss, CSF leak was highlighted.  All questions were answered.  The patient/caregiver indicated understanding of the discussion and elected to proceed with the procedure.  Will schedule as available.       Follow up:  -plan for follow up in clinic as needed    All of the above findings, impressions, treatment planning and follow up plans were discussed with the patient who indicated understanding.  the patient was instructed to contact or return to clinic sooner if symptoms/signs persist or worsen despite the above management.      Juan Francisco Nicole MD  Otolaryngology - Head and Neck Surgery            History Of Present Illness  Dany Mayorga is a 30 y.o. male presenting for review of CT sinus.    No change in symptoms   No acute sinusitis symptoms.   Continues on fluticasone, astelin, singulair.     Recall  Dany Mayorga is a 29 y.o. male with ahistory of FESS for CRS+NP presenting for follow up.  Reports overall doing well  Lost access to Xhance which had been helpful (unable to get a  hold of mail order pharmacy).   Has been using PO antihistamines with some effect, but causing bothersome sedation.   Using saline sprays intermittently.   Notes congestion, PND, sinus pressure.      Considering use of biologics to aid in polyp control.       Recall  Dany Mayorga is a 29 y.o. male with a history of FESS for CRS+NP (nasal polyp) presenting for follow up.   Reports waxing/waning congestion, sinus pressure wtihout persistent sinus pain.   Reports dry rhinitis, using saline sprays/flushes intermittently.  Reports poor effect with flonase and nasacort use.   Reports discomfort when had been using neilmed with mupirocin/bactroban.  No notable effect from antibiotic at the time.      Ongoing on/off ETD symptoms, but no hearing loss or middle ear dysfunction on recent audio 12/13/24     Recall     Dany Mayorga is a 29 y.o. male presenting for follow up of nasal congestion.  Reports gradual increase in R>L nasal congestion/obstruction.  No sinus pain/pressure or significant rhinorrhea.  Using neilmed rinse with decreased penetration on R.   Xhance was helpful but changed insurance and lost access.   No current nasal medicated sprays, no allergy medications.       Notes some waxing/waning ear pressure and baseline hearing deficit.       Recall     Dany Mayorga is a 29 y.o. male presenting for follow up s/p septoFESS (b/l MMA, andres takedown).   Reports overall doing well.   Notes some burning irritation in nose after use of flonase and saline irrigations.   Notes some intermittent congestion and sinus pressure.   No pain or rhinorrhea.       Recall       28yoM s/p septoFESS presents for follow up.    Reports good nasal airflow and no sinus pain/pressure  Mild baseline congetion.    No purulent rhinorrhea  Decreasing need to complete   irrigations.      recall     The pt presents for postoperative evaluation s/p septoFESS (b/l MMA, andres takedown)  on DOS 4/9/24.    No acute complaints  currently.  Stable postoperative course.  No fevers, chills, N/V, SOB.  No significant bleeding.  Back to regular activity levels.  No dietary restrictions.        Past Medical History  He has a past medical history of Anxiety, Cervical disc disorder (10/29/2020), Chronic ethmoidal sinusitis, Chronic maxillary sinusitis, Chronic pain disorder (10/30/2020), Class 1 obesity with body mass index (BMI) of 32.0 to 32.9 in adult (04/02/2024), Depression (2009), Deviated septum, Extremity pain (10/29/2020), HTN (hypertension), Migraine (2014), Nasal congestion, Nasal turbinate hypertrophy, Neck pain (10/29/2020), Personal history of other specified conditions (12/08/2021), and Right lower quadrant pain (12/08/2021).    Surgical History  He has a past surgical history that includes Epidural block injection (July 2023); Colonoscopy; [place] tooth extraction; Nasal septoplasty w/ turbinoplasty (Bilateral, 04/09/2024); and Sinusotomy (Bilateral, 04/09/2024).     Social History  He reports that he has never smoked. He has never been exposed to tobacco smoke. He has never used smokeless tobacco. He reports that he does not currently use alcohol after a past usage of about 1.0 - 2.0 standard drink of alcohol per week. He reports that he does not use drugs.    Family History  Family History[1]     Allergies  Bee venom protein (honey bee), Other, and Sulfa (sulfonamide antibiotics)    Review of Systems  ROS: Pertinent positives as noted in HPI.    - CONSTITUTIONAL: Does not report weight loss, fever or chills.    - HEENT:   Ear: Does not report tinnitus, vertigo, hearing loss, otalgia, otorrhea  Nose: Does not report  ,  , epistaxis, decreased smell  Throat: Does not report pain, dysphagia, odynophagia  Larynx: Does not report hoarseness,  difficulty breathing, pain with speaking (odynophonia)  Neck: Does not report new masses, pain, swelling  Face: Does not report sinus pain,  , swelling, numbness, weakness     - RESPIRATORY: Does  not report SOB or cough.    - CV: Does not report palpitations or chest pain.     - GI: Does not report abdominal pain, nausea, vomiting or diarrhea.    - : Does not report dysuria or urinary frequency.    - MSK: Does not report myalgia or joint pain.    - SKIN: Does not report rash or pruritus.    - NEUROLOGICAL: Does not report headache or syncope.    - PSYCHIATRIC: Does not report recent changes in mood. Does not report anxiety or depression.         Physical Exam:     GENERAL:   Alert & Oriented to person, place and time; Normal affect and appearance. Well developed and well nourished. Conversant & cooperative with examination.     HEAD:   Normocephalic, atraumatic. No sinus tenderness to palpation. Normal parotid bilaterally. Normal facial strength.     NEUROLOGIC:   Cranial nerves II-XII grossly intact, gait WNL. Normal mood and affect.    EYES:   Extraocular movements intact. Pupils equal, round, reactive to light and accommodation. No nystagmus, no ptosis. no scleral injection.    EAR:   Normal auricle. No discomfort or TTP with manipulation.   Handheld otoscopic exam showed normal external auditory canals bilaterally. No purulence or EAC inflammation. Minimal cerumen.   Right tympanic membrane clear and mobile without evidence of perforation, retraction or middle ear effusion.   Left tympanic membrane clear and mobile without evidence of perforation, retraction or middle ear effusion.     NOSE:   No external deformity. No external nasal lesions, lacerations, or scars. Nasal tip symmetrical with normal nasal valves.   Nasal cavity with essentially midline septum, normal mucosa and turbinates. Right nasal polyp.     OC/OP:   Mucous membranes moist, no masses, lesions or exudates.   Normal tongue, floor of mouth, teeth, gums, lips. Normal posterior pharyngeal wall.    Normal tonsils without erythema, exudate or obvious calculi     NECK:   No neck masses or thyroid enlargement. Trachea midline. No tenderness  to palpation    LYMPHATIC:   No cervical lymphadenopathy.     RESPIRATORY:   Symmetric chest elevation & no retractions. No significant hoarseness. No increased work of breathing.    CV:   No clubbing or cyanosis. No obvious edema    Skin:   No facial rashes, vesicles or lesions.     Extremities:   No gross abnormalities      Clinic Procedure        Information review:  External sources (notes, imaging, lab results) listed below personally reviewed to aid in medical decision making process.  -CT sinus 5/30/25  -  -         [1]   Family History  Problem Relation Name Age of Onset    Hypertension Mother Nora Mayorga     Graves' disease Mother Nora Mayorga     Depression Mother Nora Mayorga     Paranoid behavior Father Power Mayorga     Anxiety disorder Father Power Mayorga     Migraines Father Power Salmeroneltonlali     Other (hld) Father Power Mayorga     Hyperlipidemia Father Power Mayorga     Hypertension Father Power Mayorga     Mental illness Father Power Mayorga     Asthma Father Power Mayorga     Migraines Sister Sonia     Thyroid disease Sister Sonia     Depression Sister Sonia     Migraines Brother      Asperger's syndrome Brother      Depression Brother Shelton     Hypertension Brother Shelton     Mental illness Brother Shelton     Migraines Brother Shelton     GI problems Father's Brother Allen Salmeronrafiq     Arthritis Maternal Grandmother Hellen Melissa     Cancer Maternal Grandmother Hellen Melissa     Depression Maternal Grandmother Hellen Melissa     Diabetes Maternal Grandmother Hellen Melissa     Thyroid disease Maternal Grandfather Jeremiah Melissa     Other (bca) Maternal Grandfather Jeremiah Melissa     Diabetes type II Maternal Grandfather Jeremiah Melissa     Muscular dystrophy Maternal Grandfather Jeremiah Melissa     Cancer Maternal Grandfather Jeremiah Melissa     Diabetes Maternal Grandfather Jeremiah Melissa     Muscular dystrophy Paternal Grandfather Cal Mayorga     Melanoma Paternal Grandfather Cal Mayorga     Early natural  death Paternal Grandfather Cal Mayorga     GI problems Paternal Grandfather Cal Mayorga

## 2025-06-23 ENCOUNTER — ANESTHESIA EVENT (OUTPATIENT)
Dept: OPERATING ROOM | Facility: HOSPITAL | Age: 30
End: 2025-06-23
Payer: COMMERCIAL

## 2025-06-23 ENCOUNTER — HOSPITAL ENCOUNTER (OUTPATIENT)
Dept: GASTROENTEROLOGY | Facility: HOSPITAL | Age: 30
Discharge: HOME | End: 2025-06-23
Payer: COMMERCIAL

## 2025-06-23 VITALS
RESPIRATION RATE: 18 BRPM | HEART RATE: 94 BPM | OXYGEN SATURATION: 96 % | SYSTOLIC BLOOD PRESSURE: 121 MMHG | DIASTOLIC BLOOD PRESSURE: 82 MMHG

## 2025-06-23 DIAGNOSIS — K59.02 CONSTIPATION DUE TO OUTLET DYSFUNCTION: ICD-10-CM

## 2025-06-23 PROCEDURE — 91122 ANAL MANOMETRY: CPT | Performed by: INTERNAL MEDICINE

## 2025-06-23 PROCEDURE — 2720000007 HC OR 272 NO HCPCS

## 2025-06-23 PROCEDURE — 91122 HC ANAL PRESSURE RECORD - MANOMETRY ANAL: CPT

## 2025-06-23 PROCEDURE — 91122 ANAL MANOMETRY: CPT

## 2025-06-23 RX ORDER — LAMOTRIGINE 200 MG/1
250 TABLET ORAL DAILY
COMMUNITY

## 2025-06-23 ASSESSMENT — PAIN - FUNCTIONAL ASSESSMENT
PAIN_FUNCTIONAL_ASSESSMENT: 0-10
PAIN_FUNCTIONAL_ASSESSMENT: 0-10

## 2025-06-23 ASSESSMENT — PAIN SCALES - GENERAL
PAINLEVEL_OUTOF10: 4
PAINLEVEL_OUTOF10: 3

## 2025-06-23 NOTE — DISCHARGE INSTRUCTIONS
Return to normal diet, medications and activities. You may experience slight soreness and minimal bleeding near the rectum for today. Follow up with ordering provider, Dr. Randall, in about two weeks for your results. Please call [phone] if you need to speak with the manometry nurse about your test.

## 2025-06-23 NOTE — NURSING NOTE
Anorectal Manometry Test    Referring Provider: Nishant Randall MD  [id number]  [facility] [place] [address]    Indication: Constipation due to outlet dysfunction [K59.02]     Symptoms: Constipation and RLQ abdominal pain    Age: 30 y.o.    After standard calibration of AMOT catheter and digital exam, placed catheter in rectum to 10 cm.  Performed all standard measurements.  Patient was able to expel catheter intact with 50 ml of water in it in 15 seconds.  Discharged pt to home in stable condition without complaint.

## 2025-06-27 ENCOUNTER — ANESTHESIA (OUTPATIENT)
Dept: OPERATING ROOM | Facility: HOSPITAL | Age: 30
End: 2025-06-27
Payer: COMMERCIAL

## 2025-06-27 ENCOUNTER — HOSPITAL ENCOUNTER (OUTPATIENT)
Facility: HOSPITAL | Age: 30
Setting detail: OUTPATIENT SURGERY
Discharge: HOME | End: 2025-06-27
Attending: GENERAL PRACTICE | Admitting: GENERAL PRACTICE
Payer: COMMERCIAL

## 2025-06-27 ENCOUNTER — PHARMACY VISIT (OUTPATIENT)
Dept: PHARMACY | Facility: CLINIC | Age: 30
End: 2025-06-27
Payer: COMMERCIAL

## 2025-06-27 VITALS
HEART RATE: 80 BPM | HEIGHT: 68 IN | TEMPERATURE: 97 F | BODY MASS INDEX: 33.48 KG/M2 | WEIGHT: 220.9 LBS | OXYGEN SATURATION: 98 % | SYSTOLIC BLOOD PRESSURE: 130 MMHG | DIASTOLIC BLOOD PRESSURE: 80 MMHG | RESPIRATION RATE: 15 BRPM

## 2025-06-27 DIAGNOSIS — Z98.890 S/P FESS (FUNCTIONAL ENDOSCOPIC SINUS SURGERY): Primary | ICD-10-CM

## 2025-06-27 DIAGNOSIS — J32.0 CHRONIC MAXILLARY SINUSITIS: ICD-10-CM

## 2025-06-27 DIAGNOSIS — J33.9 NASAL POLYP: ICD-10-CM

## 2025-06-27 PROCEDURE — 2500000004 HC RX 250 GENERAL PHARMACY W/ HCPCS (ALT 636 FOR OP/ED): Performed by: ANESTHESIOLOGY

## 2025-06-27 PROCEDURE — 3600000018 HC OR TIME - INITIAL BASE CHARGE - PROCEDURE LEVEL SIX: Performed by: GENERAL PRACTICE

## 2025-06-27 PROCEDURE — 3700000001 HC GENERAL ANESTHESIA TIME - INITIAL BASE CHARGE: Performed by: GENERAL PRACTICE

## 2025-06-27 PROCEDURE — 7100000009 HC PHASE TWO TIME - INITIAL BASE CHARGE: Performed by: GENERAL PRACTICE

## 2025-06-27 PROCEDURE — 3700000002 HC GENERAL ANESTHESIA TIME - EACH INCREMENTAL 1 MINUTE: Performed by: GENERAL PRACTICE

## 2025-06-27 PROCEDURE — 2500000005 HC RX 250 GENERAL PHARMACY W/O HCPCS

## 2025-06-27 PROCEDURE — 2500000005 HC RX 250 GENERAL PHARMACY W/O HCPCS: Performed by: GENERAL PRACTICE

## 2025-06-27 PROCEDURE — 2720000007 HC OR 272 NO HCPCS: Performed by: GENERAL PRACTICE

## 2025-06-27 PROCEDURE — RXMED WILLOW AMBULATORY MEDICATION CHARGE

## 2025-06-27 PROCEDURE — 7100000010 HC PHASE TWO TIME - EACH INCREMENTAL 1 MINUTE: Performed by: GENERAL PRACTICE

## 2025-06-27 PROCEDURE — 7100000001 HC RECOVERY ROOM TIME - INITIAL BASE CHARGE: Performed by: GENERAL PRACTICE

## 2025-06-27 PROCEDURE — 88305 TISSUE EXAM BY PATHOLOGIST: CPT | Mod: TC,GEALAB,PARLAB | Performed by: GENERAL PRACTICE

## 2025-06-27 PROCEDURE — 7100000002 HC RECOVERY ROOM TIME - EACH INCREMENTAL 1 MINUTE: Performed by: GENERAL PRACTICE

## 2025-06-27 PROCEDURE — 2500000004 HC RX 250 GENERAL PHARMACY W/ HCPCS (ALT 636 FOR OP/ED): Performed by: GENERAL PRACTICE

## 2025-06-27 PROCEDURE — 31237 NSL/SINS NDSC SURG BX POLYPC: CPT | Performed by: GENERAL PRACTICE

## 2025-06-27 PROCEDURE — 2500000004 HC RX 250 GENERAL PHARMACY W/ HCPCS (ALT 636 FOR OP/ED)

## 2025-06-27 PROCEDURE — 88305 TISSUE EXAM BY PATHOLOGIST: CPT | Performed by: PATHOLOGY

## 2025-06-27 PROCEDURE — 2500000001 HC RX 250 WO HCPCS SELF ADMINISTERED DRUGS (ALT 637 FOR MEDICARE OP): Performed by: GENERAL PRACTICE

## 2025-06-27 PROCEDURE — 88304 TISSUE EXAM BY PATHOLOGIST: CPT | Performed by: PATHOLOGY

## 2025-06-27 PROCEDURE — 61782 SCAN PROC CRANIAL EXTRA: CPT | Performed by: GENERAL PRACTICE

## 2025-06-27 PROCEDURE — 3600000017 HC OR TIME - EACH INCREMENTAL 1 MINUTE - PROCEDURE LEVEL SIX: Performed by: GENERAL PRACTICE

## 2025-06-27 RX ORDER — MIDAZOLAM HYDROCHLORIDE 1 MG/ML
INJECTION, SOLUTION INTRAMUSCULAR; INTRAVENOUS AS NEEDED
Status: DISCONTINUED | OUTPATIENT
Start: 2025-06-27 | End: 2025-06-27

## 2025-06-27 RX ORDER — SODIUM CHLORIDE 0.9 G/100ML
INJECTION, SOLUTION IRRIGATION AS NEEDED
Status: DISCONTINUED | OUTPATIENT
Start: 2025-06-27 | End: 2025-06-27 | Stop reason: HOSPADM

## 2025-06-27 RX ORDER — ONDANSETRON HYDROCHLORIDE 2 MG/ML
INJECTION, SOLUTION INTRAVENOUS AS NEEDED
Status: DISCONTINUED | OUTPATIENT
Start: 2025-06-27 | End: 2025-06-27

## 2025-06-27 RX ORDER — ACETAMINOPHEN 500 MG
1000 TABLET ORAL EVERY 6 HOURS PRN
Qty: 30 TABLET | Refills: 0 | Status: SHIPPED | OUTPATIENT
Start: 2025-06-27 | End: 2025-07-07

## 2025-06-27 RX ORDER — CELECOXIB 200 MG/1
200 CAPSULE ORAL 2 TIMES DAILY
Qty: 30 CAPSULE | Refills: 0 | Status: SHIPPED | OUTPATIENT
Start: 2025-06-27

## 2025-06-27 RX ORDER — TRIAMCINOLONE ACETONIDE 40 MG/ML
INJECTION, SUSPENSION INTRA-ARTICULAR; INTRAMUSCULAR AS NEEDED
Status: DISCONTINUED | OUTPATIENT
Start: 2025-06-27 | End: 2025-06-27 | Stop reason: HOSPADM

## 2025-06-27 RX ORDER — OXYCODONE HYDROCHLORIDE 5 MG/1
5 TABLET ORAL EVERY 6 HOURS PRN
Qty: 15 TABLET | Refills: 0 | Status: SHIPPED | OUTPATIENT
Start: 2025-06-27 | End: 2025-07-04

## 2025-06-27 RX ORDER — OXYCODONE HYDROCHLORIDE 5 MG/1
5 TABLET ORAL EVERY 4 HOURS PRN
Status: DISCONTINUED | OUTPATIENT
Start: 2025-06-27 | End: 2025-06-27 | Stop reason: HOSPADM

## 2025-06-27 RX ORDER — SODIUM CHLORIDE, SODIUM LACTATE, POTASSIUM CHLORIDE, CALCIUM CHLORIDE 600; 310; 30; 20 MG/100ML; MG/100ML; MG/100ML; MG/100ML
20 INJECTION, SOLUTION INTRAVENOUS CONTINUOUS
Status: DISCONTINUED | OUTPATIENT
Start: 2025-06-27 | End: 2025-06-27 | Stop reason: HOSPADM

## 2025-06-27 RX ORDER — MEPERIDINE HYDROCHLORIDE 25 MG/ML
12.5 INJECTION INTRAMUSCULAR; INTRAVENOUS; SUBCUTANEOUS EVERY 10 MIN PRN
Status: DISCONTINUED | OUTPATIENT
Start: 2025-06-27 | End: 2025-06-27 | Stop reason: HOSPADM

## 2025-06-27 RX ORDER — MUPIROCIN 20 MG/G
1 OINTMENT TOPICAL 2 TIMES DAILY
Qty: 22 G | Refills: 0 | Status: SHIPPED | OUTPATIENT
Start: 2025-06-27

## 2025-06-27 RX ORDER — OXYMETAZOLINE HCL 0.05 %
SPRAY, NON-AEROSOL (ML) NASAL AS NEEDED
Status: DISCONTINUED | OUTPATIENT
Start: 2025-06-27 | End: 2025-06-27 | Stop reason: HOSPADM

## 2025-06-27 RX ORDER — DOCUSATE SODIUM 100 MG/1
100 CAPSULE, LIQUID FILLED ORAL 2 TIMES DAILY
Qty: 30 CAPSULE | Refills: 0 | Status: SHIPPED | OUTPATIENT
Start: 2025-06-27

## 2025-06-27 RX ORDER — LIDOCAINE HYDROCHLORIDE AND EPINEPHRINE 10; 10 UG/ML; MG/ML
INJECTION, SOLUTION INFILTRATION; PERINEURAL AS NEEDED
Status: DISCONTINUED | OUTPATIENT
Start: 2025-06-27 | End: 2025-06-27 | Stop reason: HOSPADM

## 2025-06-27 RX ORDER — LIDOCAINE HYDROCHLORIDE 40 MG/ML
SOLUTION TOPICAL AS NEEDED
Status: DISCONTINUED | OUTPATIENT
Start: 2025-06-27 | End: 2025-06-27

## 2025-06-27 RX ORDER — LIDOCAINE HYDROCHLORIDE 10 MG/ML
INJECTION, SOLUTION EPIDURAL; INFILTRATION; INTRACAUDAL; PERINEURAL AS NEEDED
Status: DISCONTINUED | OUTPATIENT
Start: 2025-06-27 | End: 2025-06-27

## 2025-06-27 RX ORDER — METHYLPREDNISOLONE 4 MG/1
TABLET ORAL
Qty: 21 TABLET | Refills: 0 | Status: SHIPPED | OUTPATIENT
Start: 2025-06-27

## 2025-06-27 RX ORDER — OXYMETAZOLINE HCL 0.05 %
2 SPRAY, NON-AEROSOL (ML) NASAL EVERY 12 HOURS PRN
Qty: 30 ML | Refills: 0 | Status: SHIPPED | OUTPATIENT
Start: 2025-06-27 | End: 2025-07-11

## 2025-06-27 RX ORDER — DROPERIDOL 2.5 MG/ML
0.62 INJECTION, SOLUTION INTRAMUSCULAR; INTRAVENOUS ONCE AS NEEDED
Status: DISCONTINUED | OUTPATIENT
Start: 2025-06-27 | End: 2025-06-27 | Stop reason: HOSPADM

## 2025-06-27 RX ORDER — ONDANSETRON HYDROCHLORIDE 2 MG/ML
4 INJECTION, SOLUTION INTRAVENOUS ONCE AS NEEDED
Status: COMPLETED | OUTPATIENT
Start: 2025-06-27 | End: 2025-06-27

## 2025-06-27 RX ORDER — CEFAZOLIN 1 G/1
INJECTION, POWDER, FOR SOLUTION INTRAVENOUS AS NEEDED
Status: DISCONTINUED | OUTPATIENT
Start: 2025-06-27 | End: 2025-06-27

## 2025-06-27 RX ORDER — ALBUTEROL SULFATE 0.83 MG/ML
2.5 SOLUTION RESPIRATORY (INHALATION) ONCE AS NEEDED
Status: DISCONTINUED | OUTPATIENT
Start: 2025-06-27 | End: 2025-06-27 | Stop reason: HOSPADM

## 2025-06-27 RX ORDER — TRANEXAMIC ACID 1 G/10ML
INJECTION, SOLUTION INTRAVENOUS AS NEEDED
Status: DISCONTINUED | OUTPATIENT
Start: 2025-06-27 | End: 2025-06-27

## 2025-06-27 RX ORDER — PROPOFOL 10 MG/ML
INJECTION, EMULSION INTRAVENOUS AS NEEDED
Status: DISCONTINUED | OUTPATIENT
Start: 2025-06-27 | End: 2025-06-27

## 2025-06-27 RX ORDER — DIPHENHYDRAMINE HYDROCHLORIDE 50 MG/ML
12.5 INJECTION, SOLUTION INTRAMUSCULAR; INTRAVENOUS ONCE AS NEEDED
Status: DISCONTINUED | OUTPATIENT
Start: 2025-06-27 | End: 2025-06-27 | Stop reason: HOSPADM

## 2025-06-27 RX ORDER — ROCURONIUM BROMIDE 10 MG/ML
INJECTION, SOLUTION INTRAVENOUS AS NEEDED
Status: DISCONTINUED | OUTPATIENT
Start: 2025-06-27 | End: 2025-06-27

## 2025-06-27 RX ORDER — SODIUM CHLORIDE, SODIUM LACTATE, POTASSIUM CHLORIDE, CALCIUM CHLORIDE 600; 310; 30; 20 MG/100ML; MG/100ML; MG/100ML; MG/100ML
100 INJECTION, SOLUTION INTRAVENOUS CONTINUOUS
Status: DISCONTINUED | OUTPATIENT
Start: 2025-06-27 | End: 2025-06-27 | Stop reason: HOSPADM

## 2025-06-27 RX ORDER — FENTANYL CITRATE 50 UG/ML
INJECTION, SOLUTION INTRAMUSCULAR; INTRAVENOUS AS NEEDED
Status: DISCONTINUED | OUTPATIENT
Start: 2025-06-27 | End: 2025-06-27

## 2025-06-27 RX ORDER — DEXMEDETOMIDINE IN 0.9 % NACL 20 MCG/5ML
SYRINGE (ML) INTRAVENOUS AS NEEDED
Status: DISCONTINUED | OUTPATIENT
Start: 2025-06-27 | End: 2025-06-27

## 2025-06-27 RX ADMIN — ROCURONIUM BROMIDE 10 MG: 10 INJECTION, SOLUTION INTRAVENOUS at 11:29

## 2025-06-27 RX ADMIN — Medication 8 MCG: at 11:48

## 2025-06-27 RX ADMIN — LIDOCAINE HYDROCHLORIDE 4 ML: 40 SOLUTION TOPICAL at 11:15

## 2025-06-27 RX ADMIN — SODIUM CHLORIDE, POTASSIUM CHLORIDE, SODIUM LACTATE AND CALCIUM CHLORIDE 20 ML/HR: 600; 310; 30; 20 INJECTION, SOLUTION INTRAVENOUS at 09:56

## 2025-06-27 RX ADMIN — SODIUM CHLORIDE, POTASSIUM CHLORIDE, SODIUM LACTATE AND CALCIUM CHLORIDE: 600; 310; 30; 20 INJECTION, SOLUTION INTRAVENOUS at 11:08

## 2025-06-27 RX ADMIN — DEXAMETHASONE SODIUM PHOSPHATE 8 MG: 4 INJECTION INTRA-ARTICULAR; INTRALESIONAL; INTRAMUSCULAR; INTRAVENOUS; SOFT TISSUE at 11:20

## 2025-06-27 RX ADMIN — SUGAMMADEX 200 MG: 100 INJECTION, SOLUTION INTRAVENOUS at 11:56

## 2025-06-27 RX ADMIN — ONDANSETRON 4 MG: 2 INJECTION, SOLUTION INTRAMUSCULAR; INTRAVENOUS at 11:21

## 2025-06-27 RX ADMIN — CEFAZOLIN 2 G: 1 INJECTION, POWDER, FOR SOLUTION INTRAMUSCULAR; INTRAVENOUS at 11:19

## 2025-06-27 RX ADMIN — FENTANYL CITRATE 50 MCG: 50 INJECTION, SOLUTION INTRAMUSCULAR; INTRAVENOUS at 11:17

## 2025-06-27 RX ADMIN — ROCURONIUM BROMIDE 50 MG: 10 INJECTION, SOLUTION INTRAVENOUS at 11:13

## 2025-06-27 RX ADMIN — LIDOCAINE HYDROCHLORIDE 50 MG: 10 INJECTION, SOLUTION EPIDURAL; INFILTRATION; INTRACAUDAL; PERINEURAL at 11:12

## 2025-06-27 RX ADMIN — HYDROMORPHONE HYDROCHLORIDE 0.5 MG: 2 INJECTION, SOLUTION INTRAMUSCULAR; INTRAVENOUS; SUBCUTANEOUS at 11:33

## 2025-06-27 RX ADMIN — PROPOFOL 200 MG: 10 INJECTION, EMULSION INTRAVENOUS at 11:12

## 2025-06-27 RX ADMIN — FENTANYL CITRATE 50 MCG: 50 INJECTION, SOLUTION INTRAMUSCULAR; INTRAVENOUS at 11:12

## 2025-06-27 RX ADMIN — ONDANSETRON 4 MG: 2 INJECTION INTRAMUSCULAR; INTRAVENOUS at 12:53

## 2025-06-27 RX ADMIN — TRANEXAMIC ACID 1000 MG: 1 INJECTION, SOLUTION INTRAVENOUS at 11:20

## 2025-06-27 RX ADMIN — MIDAZOLAM 2 MG: 1 INJECTION INTRAMUSCULAR; INTRAVENOUS at 11:08

## 2025-06-27 SDOH — HEALTH STABILITY: MENTAL HEALTH: CURRENT SMOKER: 0

## 2025-06-27 ASSESSMENT — COLUMBIA-SUICIDE SEVERITY RATING SCALE - C-SSRS
2. HAVE YOU ACTUALLY HAD ANY THOUGHTS OF KILLING YOURSELF?: NO
6. HAVE YOU EVER DONE ANYTHING, STARTED TO DO ANYTHING, OR PREPARED TO DO ANYTHING TO END YOUR LIFE?: NO
1. IN THE PAST MONTH, HAVE YOU WISHED YOU WERE DEAD OR WISHED YOU COULD GO TO SLEEP AND NOT WAKE UP?: NO

## 2025-06-27 ASSESSMENT — PAIN SCALES - GENERAL
PAINLEVEL_OUTOF10: 0 - NO PAIN

## 2025-06-27 ASSESSMENT — PAIN - FUNCTIONAL ASSESSMENT: PAIN_FUNCTIONAL_ASSESSMENT: 0-10

## 2025-06-27 NOTE — OP NOTE
ENDOSCOPY, NOSE OR PARANASAL SINUS, WITH MAXILLARY ANTROSTOMY (R), ENDOSCOPY, NOSE, USING COMPUTER-ASSISTED NAVIGATION, WITH POLYPECTOMY (R) Operative Note     Date: 2025  OR Location: [facility] OR    Name: Dany Mayorga, : 1995, Age: 30 y.o., MRN: [id number], Sex: male    Diagnosis  Pre-op Diagnosis      * Nasal polyp [J33.9]     * Chronic maxillary sinusitis [J32.0] Post-op Diagnosis     * Nasal polyp [J33.9]     * Chronic maxillary sinusitis [J32.0]     Procedures  ENDOSCOPY, NOSE OR PARANASAL SINUS, WITH MAXILLARY ANTROSTOMY  32567 - [place] NASAL/SINUS ENDOSCOPY W/MAXILLARY ANTROSTOMY    ENDOSCOPY, NOSE, USING COMPUTER-ASSISTED NAVIGATION, WITH POLYPECTOMY  28512 - [place] NASAL/SINUS NDSC SURG W/BX POLYPC/DBRDMT SPX      Surgeons      * Juan Francisco Nicole - Primary    Resident/Fellow/Other Assistant:  Surgeons and Role:  * No surgeons found with a matching role *    Staff:   Circulator: Nereida Baumanub Person: Zeina Baumanub Person: Eduar  Circulator: Audra    Anesthesia Staff: Anesthesiologist: Allan White MD  CRNA: VINOD Johnson-CRNA  SRNA: Preeti Neely    Procedure Summary  Anesthesia: Anesthesia type not filed in the log.  ASA: II  Estimated Blood Loss: 15  mL  Intra-op Medications:   Administrations occurring from 1115 to 1315 on 25:   Medication Name Total Dose   sodium chloride 0.9 % irrigation solution 1,000 mL   oxymetazoline (Afrin) 0.05 % nasal spray 2 spray   triamcinolone acetonide (Kenalog-40) injection 40 mg   ceFAZolin (Ancef) vial 1 g 2 g   dexAMETHasone (Decadron) 4 mg/mL IV Syringe 2 mL 8 mg   dexMEDETOMidine 4 mcg/mL in NS syringe 8 mcg   fentaNYL (Sublimaze) injection 50 mcg/mL 50 mcg   HYDROmorphone (Dilaudid) injection 2 mg/mL 0.5 mg   lidocaine (LTA Kit) for intubation 4 mL   ondansetron (Zofran) 2 mg/mL injection 4 mg   rocuronium (ZeMuron) 50 mg/5 mL injection 10 mg   tranexamic acid (Cyklokapron) injection 1,000 mg              Anesthesia Record               Intraprocedure  I/O Totals          Intake    Tranexamic Acid 0.00 mL    The total shown is the total volume documented since Anesthesia Start was filed.    Total Intake 0 mL          Specimen:   ID Type Source Tests Collected by Time   1 : RIGHT MAXILLARY SINUS CONTENTS # 1 Tissue SINUS CONTENTS RIGHT SURGICAL PATHOLOGY EXAM Juan Francisco Nicole MD 6/27/2025 1137   2 : RIGHT MAXILLARY CONTENTS #2 Tissue SINUS CONTENTS RIGHT SURGICAL PATHOLOGY EXAM Juan Francisco Nicole MD 6/27/2025 1144                 Drains and/or Catheters: * None in log *    Tourniquet Times:         Implants:     Findings: right nasal polyp obstructing MMA, with MRC in maxillary sinus and purulent material in maxillary sinus.  Culture and specimen taken.      Indications: Dany Mayorga is an 30 y.o. male who is having surgery for Nasal polyp [J33.9]  Chronic maxillary sinusitis [J32.0].      The patient was seen in the preoperative area. The risks, benefits, complications, treatment options, non-operative alternatives, expected recovery and outcomes were discussed with the patient. The possibilities of reaction to medication, pulmonary aspiration, injury to surrounding structures, bleeding, recurrent infection, the need for additional procedures, failure to diagnose a condition, and creating a complication requiring transfusion or operation were discussed with the patient. The patient concurred with the proposed plan, giving informed consent.  The site of surgery was properly noted/marked if necessary per policy. The patient has been actively warmed in preoperative area. Preoperative antibiotics have been ordered and given within 1 hours of incision. Venous thrombosis prophylaxis have been ordered including bilateral sequential compression devices    Procedure Details:   Procedure  The patient was met in the preoperative holding area, and the operative plan was discussed.  All questions were answered.      The patient was brought back to the operative suite by anesthesia  staff, laid supine on the operating table and general endotracheal anesthesia was induced.  The patient was prepped and draped in the usual standard fashion and an operative pause was performed.  The CT guidance system was applied and registered.  Under endoscopic guidance, a total of 3.5cc of 1% lidocaine with 1:100,000 epinephrine was injected in the axilla of the middle turbinate and polyp on the right as well as the inferior turbinate and septum .  Attention was turned to the right  nasal cavity first.       The polyp was debrided using the microdebrider, with specimen collected in the suction trap.   Green purulent/granular debris was removed and handed off as culture.   The MMA was identified and the polyp within the maxillary sinus removed.  An obstructive mucous retention cyst was identified, debrided and marsupialized as best able through the MMA.  The MMA was widely patent and majority of maxillary polyp/cyst was removed.       After completion of the right sinus dissections and assurance of hemostasis, kenalog 40 infused stammberger foam was used to fill the dissection cavity.        An orogastric tube was passed to suction out the contents of the esophagus and stomach.  The patient was then turned back over to the anesthesia service for extubation and transport to the post-operative recovery area.   Evidence of Infection: yes.  Purulence in right maxillary sent for culture.   Complications:  None; patient tolerated the procedure well.    Disposition: PACU - hemodynamically stable.  Condition: stable                 Additional Details:      Attending Attestation:     Juan Francisco Nicole  Phone Number: [phone]

## 2025-06-27 NOTE — ANESTHESIA POSTPROCEDURE EVALUATION
Patient: Dany Mayorga    Procedure Summary       Date: 06/27/25 Room / Location: [facility] OR 01 / [facility]    Anesthesia Start: 1108 Anesthesia Stop: 1207    Procedures:       ENDOSCOPY, NOSE OR PARANASAL SINUS, WITH MAXILLARY ANTROSTOMY (Right)      ENDOSCOPY, NOSE, USING COMPUTER-ASSISTED NAVIGATION, WITH POLYPECTOMY (Right) Diagnosis:       Nasal polyp      Chronic maxillary sinusitis      (Nasal polyp [J33.9])      (Chronic maxillary sinusitis [J32.0])    Surgeons: Juan Francisco Nicole MD Responsible Provider: Allan White MD    Anesthesia Type: general ASA Status: 2            Anesthesia Type: general    Vitals Value Taken Time   /85 06/27/25 12:20   Temp 36.1 °C (97 °F) 06/27/25 12:05   Pulse 88 06/27/25 12:20   Resp 16 06/27/25 12:20   SpO2 95 % 06/27/25 12:20       Anesthesia Post Evaluation    Patient location during evaluation: PACU  Patient participation: complete - patient participated  Level of consciousness: awake  Pain management: adequate  Multimodal analgesia pain management approach  Airway patency: patent  Two or more strategies used to mitigate risk of obstructive sleep apnea  Cardiovascular status: acceptable  Respiratory status: acceptable  Hydration status: acceptable  Postoperative Nausea and Vomiting: none        There were no known notable events for this encounter.

## 2025-06-27 NOTE — ANESTHESIA PROCEDURE NOTES
Airway  Date/Time: 6/27/2025 11:16 AM  Reason: elective    Airway not difficult    Staffing  Performed: DARSHAN   Authorized by: Allan White MD    Performed by: Preeti Neely  Patient location during procedure: OR    Patient Condition  Indications for airway management: anesthesia and airway protection  Patient position: sniffing  Planned trial extubation  Sedation level: deep     Final Airway Details   Preoxygenated: yes  Final airway type: endotracheal airway  Successful airway: ETT and MATY tube  Cuffed: yes   Successful intubation technique: video laryngoscopy (denny)  Adjuncts used in placement: intubating stylet  Endotracheal tube insertion site: oral  Blade: Julia  Blade size: #4  ETT size (mm): 7.5  Cormack-Lehane Classification: grade I - full view of glottis  Placement verified by: chest auscultation and capnometry   Measured from: lips  Number of attempts at approach: 1
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
## 2025-06-27 NOTE — H&P
Assessment/Plan:   Problem List Items Addressed This Visit             ICD-10-CM            ENT     Chronic sinusitis J32.9      Other Visit Diagnoses           Codes       Nasal polyp    -  Primary J33.9     Relevant Medications     predniSONE (Deltasone) 50 mg tablet                Persistent right maxillary sinusitis with obstructive polyp.  Discussed options of observation, ongoing medical management, possible revision sinus surgery (right polypectomy and revision MMA).       Pt elects for surgical correction.   Risks, benefits and indications of the procedure were discussed.  This included discussion of risks of pain, bleeding, infection, scarring with poor functional or cosmetic result, need for future procedures, or damage to surrounding structures to include regional blood vessels and nerves.  In particular, the risk of ongoing obstruction, vision change/loss, CSF leak was highlighted.  All questions were answered.  The patient/caregiver indicated understanding of the discussion and elected to proceed with the procedure.  Will schedule as available.         Follow up:  -plan for follow up in clinic as needed     All of the above findings, impressions, treatment planning and follow up plans were discussed with the patient who indicated understanding.  the patient was instructed to contact or return to clinic sooner if symptoms/signs persist or worsen despite the above management.       Juan Francisco Nicole MD  Otolaryngology - Head and Neck Surgery               History Of Present Illness  Dany Mayorga is a 30 y.o. male presenting for review of CT sinus.    No change in symptoms   No acute sinusitis symptoms.   Continues on fluticasone, astelin, singulair.      Recall  Dany Mayorga is a 29 y.o. male with ahistory of FESS for CRS+NP presenting for follow up.  Reports overall doing well  Lost access to Xhance which had been helpful (unable to get a hold of mail order pharmacy).   Has been using PO  antihistamines with some effect, but causing bothersome sedation.   Using saline sprays intermittently.   Notes congestion, PND, sinus pressure.      Considering use of biologics to aid in polyp control.       Recall  Dany Mayorga is a 29 y.o. male with a history of FESS for CRS+NP (nasal polyp) presenting for follow up.   Reports waxing/waning congestion, sinus pressure wtihout persistent sinus pain.   Reports dry rhinitis, using saline sprays/flushes intermittently.  Reports poor effect with flonase and nasacort use.   Reports discomfort when had been using neilmed with mupirocin/bactroban.  No notable effect from antibiotic at the time.      Ongoing on/off ETD symptoms, but no hearing loss or middle ear dysfunction on recent audio 12/13/24     Recall     Dany Mayorga is a 29 y.o. male presenting for follow up of nasal congestion.  Reports gradual increase in R>L nasal congestion/obstruction.  No sinus pain/pressure or significant rhinorrhea.  Using neilmed rinse with decreased penetration on R.   Xhance was helpful but changed insurance and lost access.   No current nasal medicated sprays, no allergy medications.       Notes some waxing/waning ear pressure and baseline hearing deficit.       Recall     Dany Mayorga is a 29 y.o. male presenting for follow up s/p septoFESS (b/l MMA, andres takedown).   Reports overall doing well.   Notes some burning irritation in nose after use of flonase and saline irrigations.   Notes some intermittent congestion and sinus pressure.   No pain or rhinorrhea.       Recall       28yoM s/p septoFESS presents for follow up.    Reports good nasal airflow and no sinus pain/pressure  Mild baseline congetion.    No purulent rhinorrhea  Decreasing need to complete   irrigations.      recall     The pt presents for postoperative evaluation s/p septoFESS (b/l MMA, andres takedown)  on DOS 4/9/24.    No acute complaints currently.  Stable postoperative course.  No fevers,  chills, N/V, SOB.  No significant bleeding.  Back to regular activity levels.  No dietary restrictions.           Past Medical History  He has a past medical history of Anxiety, Cervical disc disorder (10/29/2020), Chronic ethmoidal sinusitis, Chronic maxillary sinusitis, Chronic pain disorder (10/30/2020), Class 1 obesity with body mass index (BMI) of 32.0 to 32.9 in adult (04/02/2024), Depression (2009), Deviated septum, Extremity pain (10/29/2020), HTN (hypertension), Migraine (2014), Nasal congestion, Nasal turbinate hypertrophy, Neck pain (10/29/2020), Personal history of other specified conditions (12/08/2021), and Right lower quadrant pain (12/08/2021).     Surgical History  He has a past surgical history that includes Epidural block injection (July 2023); Colonoscopy; [place] tooth extraction; Nasal septoplasty w/ turbinoplasty (Bilateral, 04/09/2024); and Sinusotomy (Bilateral, 04/09/2024).     Social History  He reports that he has never smoked. He has never been exposed to tobacco smoke. He has never used smokeless tobacco. He reports that he does not currently use alcohol after a past usage of about 1.0 - 2.0 standard drink of alcohol per week. He reports that he does not use drugs.     Family History  [Family History]    [Family History]         Problem Relation Name Age of Onset    Hypertension Mother Nora Salmeroneltonlali      Graves' disease Mother Nora Mayorga      Depression Mother Nora Mayorga      Paranoid behavior Father Powerharriet Salmeronrafiq      Anxiety disorder Father Powerharriet Salmeronrafiq      Migraines Father Power Jaronraifq      Other (hld) Father Power Jaronrafiq      Hyperlipidemia Father Power Salmeronrafiq      Hypertension Father Power Salmeronrafiq      Mental illness Father Power Salmeronrafiq      Asthma Father Power Salmeronrafiq      Migraines Sister Sonia      Thyroid disease Sister Sonia      Depression Sister Sonia      Migraines Brother        Asperger's syndrome Brother        Depression Brother  Shelton      Hypertension Brother Shelton      Mental illness Brother Shelton      Migraines Brother Shelton      GI problems Father's Brother Allen Mayorga      Arthritis Maternal Grandmother Hellen Melissa      Cancer Maternal Grandmother Hellne Melissa      Depression Maternal Grandmother Hellen Melissa      Diabetes Maternal Grandmother Hellen Melissa      Thyroid disease Maternal Grandfather Jeremiah Melissa      Other (bca) Maternal Grandfather Jeremiah Melissa      Diabetes type II Maternal Grandfather Jeremiah Melissa      Muscular dystrophy Maternal Grandfather Jeremiah Melissa      Cancer Maternal Grandfather Jeremiah Melissa      Diabetes Maternal Grandfather Jeremiah Melissa      Muscular dystrophy Paternal Grandfather Cal Salmeronhalali      Melanoma Paternal Grandfather Cal Mayorga      Early natural death Paternal Grandfather Cal Salmeronhalali      GI problems Paternal Grandfather Cal Mayorga          Allergies  Bee venom protein (honey bee), Other, and Sulfa (sulfonamide antibiotics)     Review of Systems  ROS: Pertinent positives as noted in HPI.     - CONSTITUTIONAL: Does not report weight loss, fever or chills.     - HEENT:   Ear: Does not report tinnitus, vertigo, hearing loss, otalgia, otorrhea  Nose: Does not report  ,  , epistaxis, decreased smell  Throat: Does not report pain, dysphagia, odynophagia  Larynx: Does not report hoarseness,  difficulty breathing, pain with speaking (odynophonia)  Neck: Does not report new masses, pain, swelling  Face: Does not report sinus pain,  , swelling, numbness, weakness      - RESPIRATORY: Does not report SOB or cough.     - CV: Does not report palpitations or chest pain.      - GI: Does not report abdominal pain, nausea, vomiting or diarrhea.     - : Does not report dysuria or urinary frequency.     - MSK: Does not report myalgia or joint pain.     - SKIN: Does not report rash or pruritus.     - NEUROLOGICAL: Does not report headache or syncope.     - PSYCHIATRIC: Does not report recent changes in mood. Does not report  anxiety or depression.           Physical Exam:      GENERAL:   Alert & Oriented to person, place and time; Normal affect and appearance. Well developed and well nourished. Conversant & cooperative with examination.      HEAD:   Normocephalic, atraumatic. No sinus tenderness to palpation. Normal parotid bilaterally. Normal facial strength.      NEUROLOGIC:   Cranial nerves II-XII grossly intact, gait WNL. Normal mood and affect.     EYES:   Extraocular movements intact. Pupils equal, round, reactive to light and accommodation. No nystagmus, no ptosis. no scleral injection.     EAR:   Normal auricle. No discomfort or TTP with manipulation.   Handheld otoscopic exam showed normal external auditory canals bilaterally. No purulence or EAC inflammation. Minimal cerumen.   Right tympanic membrane clear and mobile without evidence of perforation, retraction or middle ear effusion.   Left tympanic membrane clear and mobile without evidence of perforation, retraction or middle ear effusion.      NOSE:   No external deformity. No external nasal lesions, lacerations, or scars. Nasal tip symmetrical with normal nasal valves.   Nasal cavity with essentially midline septum, normal mucosa and turbinates. Right nasal polyp.      OC/OP:   Mucous membranes moist, no masses, lesions or exudates.   Normal tongue, floor of mouth, teeth, gums, lips. Normal posterior pharyngeal wall.    Normal tonsils without erythema, exudate or obvious calculi      NECK:   No neck masses or thyroid enlargement. Trachea midline. No tenderness to palpation     LYMPHATIC:   No cervical lymphadenopathy.      RESPIRATORY:   Symmetric chest elevation & no retractions. No significant hoarseness. No increased work of breathing.     CV:   No clubbing or cyanosis. No obvious edema     Skin:   No facial rashes, vesicles or lesions.      Extremities:   No gross abnormalities        Clinic Procedure           Information review:  External sources (notes, imaging,  lab results) listed below personally reviewed to aid in medical decision making process.  -CT sinus 5/30/25  -  -

## 2025-06-30 DIAGNOSIS — K59.02 CONSTIPATION DUE TO OUTLET DYSFUNCTION: Primary | ICD-10-CM

## 2025-07-02 ENCOUNTER — APPOINTMENT (OUTPATIENT)
Dept: OTOLARYNGOLOGY | Facility: CLINIC | Age: 30
End: 2025-07-02
Payer: COMMERCIAL

## 2025-07-02 VITALS — BODY MASS INDEX: 33.45 KG/M2 | WEIGHT: 220 LBS

## 2025-07-02 DIAGNOSIS — J32.0 CHRONIC MAXILLARY SINUSITIS: Primary | ICD-10-CM

## 2025-07-02 DIAGNOSIS — Z98.890 S/P FESS (FUNCTIONAL ENDOSCOPIC SINUS SURGERY): ICD-10-CM

## 2025-07-02 NOTE — PROGRESS NOTES
CC: postoperative visit      HPI: The pt presents for postoperative evaluation s/p revision right MMA, polypectomy and MRC removal  on DOS 6/27/25.    No acute complaints currently.  Stable postoperative course.  No fevers, chills, N/V, SOB.  No significant bleeding.  Back to regular activity levels.  No dietary restrictions.    PMHx: Reviewed, see previous note for full details.  No hx of significant cardiopulmonary health disorders or bleeding dyscrasias.   PSHx: Reviewed, see previous note for full details.  No hx of problems with GETA or complications from surgery.    Meds:  reviewed, see medication reconciliation in [employer]      GENERAL:  Alert and oriented; normal affect and appearance.  Well developed and well nourished.  Conversant and cooperative with examination.     HEAD:  Normocephalic and atraumatic.     NEUROLOGIC:  Cranial nerves II-XII grossly intact.  Gait WNL.  Normal mood and affect.   EYES:  EOMI, PERRL.  No nystagmus or ptosis noted.   EARS:  Normal auricle.  Otoscopic exam showed normal EACs without purulence or inflammation.  Tympanic membranes clear and mobile without evidence of perforation, retraction or effusion.    NOSE:  No external deformity.  Nasal cavity clear with midline septum, healing  mucosa and turbinates.  Mucoid debris suctioned from right MMA.  No purulence.   OC/OP: Mucous membranes moist without masses, lesions or exudates.  Normal tongue, floor of mouth, teeth, gums, and lips.  Normal tonsils and posterior oropharyngeal walls.     NECK:  No visible or palpable neck masses.  No tenderness, edema, or crepitus.  Trachea midline.   LYMPHATIC:  No cervical lymphadenopathy   RESPIRATORY:  No stridor or hoarseness.  No increased work of breathing or retractions. Symmetric chest elevation.   CV:  No clubbing or cyanosis   SKIN:  No facial rashes, vesicles or lesions.     Nasal Endoscopy           Nasal Endoscopy Indication:    Risks, benefits, and alternatives, infection risk,  bleeding risk and risk of mucosal trauma and pain were discussed with the patient. Verbal consent was obtained prior to the procedure and is detailed in the patient's record.     Procedure Note:      With the patient seated in the exam chair, the bilateral nasal cavity was topically treated with 4% lidocaine and phenylephrine.  The bilateral nasal cavity was intubated with the  0 degree 4mm rigid endoscope.   Nasal Endoscopy: Nasal endoscopy was successfully completed using the endoscope and the nasal mucosa, septum, turbinates, and osteomeatal complex were examined.  The spheno-ethmoid recess was examined.          Patient Status: the patient tolerated the procedure well.   Complications: there were no complications.   Significant/abnormal findings: No significant abnormal findings during the above exam, except as listed in physical exam       A/P: Overall doing well without evidence of post-surgical complications.  Counseled on local wound care and expected postoperative healing course.  Plan for ENT f/u  in  1mo.

## 2025-07-03 ENCOUNTER — OFFICE VISIT (OUTPATIENT)
Dept: ORTHOPEDIC SURGERY | Facility: CLINIC | Age: 30
End: 2025-07-03
Payer: COMMERCIAL

## 2025-07-03 VITALS — HEIGHT: 68 IN | BODY MASS INDEX: 33.34 KG/M2 | WEIGHT: 220 LBS

## 2025-07-03 DIAGNOSIS — G56.21 CUBITAL TUNNEL SYNDROME ON RIGHT: ICD-10-CM

## 2025-07-03 DIAGNOSIS — G89.29 CHRONIC ELBOW PAIN, RIGHT: ICD-10-CM

## 2025-07-03 DIAGNOSIS — G56.31 RADIAL TUNNEL SYNDROME OF RIGHT UPPER EXTREMITY: ICD-10-CM

## 2025-07-03 DIAGNOSIS — M25.521 CHRONIC ELBOW PAIN, RIGHT: ICD-10-CM

## 2025-07-03 DIAGNOSIS — M25.522 PAIN OF BOTH ELBOWS: Primary | ICD-10-CM

## 2025-07-03 DIAGNOSIS — M25.521 PAIN OF BOTH ELBOWS: Primary | ICD-10-CM

## 2025-07-03 DIAGNOSIS — G56.32 RADIAL TUNNEL SYNDROME, LEFT: ICD-10-CM

## 2025-07-03 PROCEDURE — 3008F BODY MASS INDEX DOCD: CPT | Performed by: ORTHOPAEDIC SURGERY

## 2025-07-03 PROCEDURE — 1036F TOBACCO NON-USER: CPT | Performed by: ORTHOPAEDIC SURGERY

## 2025-07-03 PROCEDURE — 99212 OFFICE O/P EST SF 10 MIN: CPT | Performed by: ORTHOPAEDIC SURGERY

## 2025-07-03 PROCEDURE — 99214 OFFICE O/P EST MOD 30 MIN: CPT | Performed by: ORTHOPAEDIC SURGERY

## 2025-07-03 ASSESSMENT — PAIN - FUNCTIONAL ASSESSMENT: PAIN_FUNCTIONAL_ASSESSMENT: 0-10

## 2025-07-03 ASSESSMENT — PAIN SCALES - GENERAL: PAINLEVEL_OUTOF10: 5 - MODERATE PAIN
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
## 2025-07-05 ENCOUNTER — OFFICE VISIT (OUTPATIENT)
Dept: URGENT CARE | Age: 30
End: 2025-07-05
Payer: COMMERCIAL

## 2025-07-05 VITALS
TEMPERATURE: 97.1 F | HEART RATE: 78 BPM | OXYGEN SATURATION: 98 % | DIASTOLIC BLOOD PRESSURE: 95 MMHG | SYSTOLIC BLOOD PRESSURE: 134 MMHG | RESPIRATION RATE: 16 BRPM

## 2025-07-05 DIAGNOSIS — L27.0 DRUG ERUPTION: Primary | ICD-10-CM

## 2025-07-05 PROCEDURE — 3075F SYST BP GE 130 - 139MM HG: CPT | Performed by: NURSE PRACTITIONER

## 2025-07-05 PROCEDURE — 1036F TOBACCO NON-USER: CPT | Performed by: NURSE PRACTITIONER

## 2025-07-05 PROCEDURE — 99213 OFFICE O/P EST LOW 20 MIN: CPT | Performed by: NURSE PRACTITIONER

## 2025-07-05 PROCEDURE — 3080F DIAST BP >= 90 MM HG: CPT | Performed by: NURSE PRACTITIONER

## 2025-07-05 RX ORDER — TRIAMCINOLONE ACETONIDE 1 MG/G
CREAM TOPICAL
Qty: 80 G | Refills: 0 | Status: SHIPPED | OUTPATIENT
Start: 2025-07-05

## 2025-07-05 RX ORDER — METHYLPREDNISOLONE 4 MG/1
TABLET ORAL
Qty: 21 TABLET | Refills: 0 | Status: SHIPPED | OUTPATIENT
Start: 2025-07-05 | End: 2025-07-11
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
## 2025-07-08 DIAGNOSIS — J32.9 CHRONIC SINUSITIS, UNSPECIFIED LOCATION: ICD-10-CM

## 2025-07-08 DIAGNOSIS — J33.9 NASAL POLYP: ICD-10-CM

## 2025-07-08 RX ORDER — MONTELUKAST SODIUM 10 MG/1
10 TABLET ORAL DAILY
Qty: 30 TABLET | Refills: 3 | Status: SHIPPED | OUTPATIENT
Start: 2025-07-08 | End: 2025-07-14 | Stop reason: SDUPTHER

## 2025-07-11 LAB
LABORATORY COMMENT REPORT: NORMAL
PATH REPORT.FINAL DX SPEC: NORMAL
PATH REPORT.GROSS SPEC: NORMAL
PATH REPORT.RELEVANT HX SPEC: NORMAL
PATH REPORT.TOTAL CANCER: NORMAL

## 2025-07-14 DIAGNOSIS — J32.9 CHRONIC SINUSITIS, UNSPECIFIED LOCATION: ICD-10-CM

## 2025-07-14 DIAGNOSIS — J33.9 NASAL POLYP: ICD-10-CM

## 2025-07-14 RX ORDER — MONTELUKAST SODIUM 10 MG/1
10 TABLET ORAL DAILY
Qty: 90 TABLET | Refills: 1 | Status: SHIPPED | OUTPATIENT
Start: 2025-07-14 | End: 2026-07-14

## 2025-07-27 ASSESSMENT — PATIENT HEALTH QUESTIONNAIRE - PHQ9
SUM OF ALL RESPONSES TO PHQ QUESTIONS 1-9: 0
2. FEELING DOWN, DEPRESSED OR HOPELESS: NOT AT ALL
SUM OF ALL RESPONSES TO PHQ QUESTIONS 1-9: 0
SUM OF ALL RESPONSES TO PHQ QUESTIONS 1-9: 0
2. FEELING DOWN, DEPRESSED OR HOPELESS: NOT AT ALL
1. LITTLE INTEREST OR PLEASURE IN DOING THINGS: NOT AT ALL
SUM OF ALL RESPONSES TO PHQ9 QUESTIONS 1 & 2: 0
1. LITTLE INTEREST OR PLEASURE IN DOING THINGS: NOT AT ALL
SUM OF ALL RESPONSES TO PHQ QUESTIONS 1-9: 0

## 2025-07-28 ENCOUNTER — OFFICE VISIT (OUTPATIENT)
Dept: FAMILY MEDICINE CLINIC | Age: 30
End: 2025-07-28

## 2025-07-28 ENCOUNTER — OFFICE VISIT (OUTPATIENT)
Dept: PULMONOLOGY | Facility: CLINIC | Age: 30
End: 2025-07-28
Payer: COMMERCIAL

## 2025-07-28 VITALS
HEIGHT: 67 IN | TEMPERATURE: 98.2 F | SYSTOLIC BLOOD PRESSURE: 135 MMHG | BODY MASS INDEX: 33.59 KG/M2 | WEIGHT: 214 LBS | RESPIRATION RATE: 18 BRPM | HEART RATE: 82 BPM | DIASTOLIC BLOOD PRESSURE: 88 MMHG | OXYGEN SATURATION: 97 %

## 2025-07-28 VITALS
HEIGHT: 68 IN | WEIGHT: 219.6 LBS | SYSTOLIC BLOOD PRESSURE: 127 MMHG | OXYGEN SATURATION: 98 % | BODY MASS INDEX: 33.28 KG/M2 | HEART RATE: 75 BPM | DIASTOLIC BLOOD PRESSURE: 76 MMHG

## 2025-07-28 DIAGNOSIS — G43.109 MIGRAINE WITH AURA AND WITHOUT STATUS MIGRAINOSUS, NOT INTRACTABLE: ICD-10-CM

## 2025-07-28 DIAGNOSIS — Z00.00 WELL ADULT EXAM: ICD-10-CM

## 2025-07-28 DIAGNOSIS — I10 PRIMARY HYPERTENSION: Primary | ICD-10-CM

## 2025-07-28 DIAGNOSIS — R91.8 LUNG NODULES: Primary | ICD-10-CM

## 2025-07-28 PROCEDURE — 99212 OFFICE O/P EST SF 10 MIN: CPT | Performed by: INTERNAL MEDICINE

## 2025-07-28 PROCEDURE — 3079F DIAST BP 80-89 MM HG: CPT | Performed by: INTERNAL MEDICINE

## 2025-07-28 PROCEDURE — 3075F SYST BP GE 130 - 139MM HG: CPT | Performed by: INTERNAL MEDICINE

## 2025-07-28 PROCEDURE — 3078F DIAST BP <80 MM HG: CPT | Performed by: FAMILY MEDICINE

## 2025-07-28 PROCEDURE — 3074F SYST BP LT 130 MM HG: CPT | Performed by: FAMILY MEDICINE

## 2025-07-28 PROCEDURE — 1036F TOBACCO NON-USER: CPT | Performed by: INTERNAL MEDICINE

## 2025-07-28 PROCEDURE — 99395 PREV VISIT EST AGE 18-39: CPT | Performed by: FAMILY MEDICINE

## 2025-07-28 PROCEDURE — 99213 OFFICE O/P EST LOW 20 MIN: CPT | Performed by: INTERNAL MEDICINE

## 2025-07-28 PROCEDURE — 3008F BODY MASS INDEX DOCD: CPT | Performed by: INTERNAL MEDICINE

## 2025-07-28 SDOH — ECONOMIC STABILITY: FOOD INSECURITY: WITHIN THE PAST 12 MONTHS, YOU WORRIED THAT YOUR FOOD WOULD RUN OUT BEFORE YOU GOT MONEY TO BUY MORE.: NEVER TRUE

## 2025-07-28 SDOH — ECONOMIC STABILITY: FOOD INSECURITY: WITHIN THE PAST 12 MONTHS, THE FOOD YOU BOUGHT JUST DIDN'T LAST AND YOU DIDN'T HAVE MONEY TO GET MORE.: NEVER TRUE
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
## 2025-07-28 NOTE — PROGRESS NOTES
[facility]  [facility]ICINE  [address]  [address]  [facility] [address]  Dept: [phone]      Yosi Root is a 30 y.o. male who presents today for follow up on his  medical conditions as noted below.      Chief Complaint   Patient presents with    Annual Exam     Fill out paperwork        There is no problem list on file for this patient.     Past Medical History:   Diagnosis Date    GERD (gastroesophageal reflux disease)     Hay fever     Headache     Hypertension     Insomnia       Past Surgical History:   Procedure Laterality Date    COLONOSCOPY       Family History   Problem Relation Age of Onset    Thyroid Disease Mother     Depression Mother     Mental Illness Mother     Miscarriages / Stillbirths Mother     High Cholesterol Father     High Blood Pressure Father     Asthma Father     Cancer Maternal Grandfather     Diabetes Maternal Grandfather     Heart Attack Maternal Grandfather     Cancer Maternal Grandmother     Diabetes Maternal Grandmother     Asthma Paternal Grandfather     Cancer Paternal Grandmother     Breast Cancer Maternal Aunt     Depression Brother     Mental Illness Brother     Depression Sister        Current Outpatient Medications   Medication Sig Dispense Refill    verapamil (CALAN SR) 240 MG extended release tablet TAKE 1 TABLET BY MOUTH EVERYDAY AT BEDTIME 90 tablet 1    levocetirizine (XYZAL) 5 MG tablet TAKE 1 TABLET BY MOUTH EVERY DAY AT NIGHT (Patient not taking: Reported on 9/10/2022) 90 tablet 1    amitriptyline (ELAVIL) 75 MG tablet TAKE 1 TABLET BY MOUTH EVERY DAY AT NIGHT (Patient not taking: Reported on 9/10/2022) 30 tablet 3    vitamin D (ERGOCALCIFEROL) 1.25 MG (24224 UT) CAPS capsule Take 50,000 Units by mouth once a week (Patient not taking: Reported on 9/10/2022)      acetaminophen (TYLENOL) 325 MG tablet Take 2 tablets by mouth every 6 hours as needed for Pain (Patient not taking: Reported on 12/9/2020) 20 tablet 0

## 2025-07-30 DIAGNOSIS — J32.9 CHRONIC SINUSITIS, UNSPECIFIED LOCATION: ICD-10-CM

## 2025-07-30 DIAGNOSIS — J33.9 NASAL POLYP: ICD-10-CM

## 2025-07-30 RX ORDER — MONTELUKAST SODIUM 10 MG/1
10 TABLET ORAL DAILY
Qty: 90 TABLET | Refills: 1 | Status: SHIPPED | OUTPATIENT
Start: 2025-07-30 | End: 2026-07-30

## 2025-08-11 ENCOUNTER — PATIENT MESSAGE (OUTPATIENT)
Dept: PRIMARY CARE | Facility: CLINIC | Age: 30
End: 2025-08-11

## 2025-08-11 ENCOUNTER — APPOINTMENT (OUTPATIENT)
Dept: OTOLARYNGOLOGY | Facility: CLINIC | Age: 30
End: 2025-08-11
Payer: COMMERCIAL

## 2025-08-11 VITALS — WEIGHT: 217 LBS | BODY MASS INDEX: 33.99 KG/M2

## 2025-08-11 DIAGNOSIS — J32.0 CHRONIC MAXILLARY SINUSITIS: ICD-10-CM

## 2025-08-11 DIAGNOSIS — I10 ESSENTIAL HYPERTENSION: ICD-10-CM

## 2025-08-11 DIAGNOSIS — J30.0 VASOMOTOR RHINITIS: Primary | ICD-10-CM

## 2025-08-11 DIAGNOSIS — J33.9 NASAL POLYP: ICD-10-CM

## 2025-08-11 PROCEDURE — 99213 OFFICE O/P EST LOW 20 MIN: CPT | Performed by: GENERAL PRACTICE

## 2025-08-11 RX ORDER — IPRATROPIUM BROMIDE 21 UG/1
2 SPRAY, METERED NASAL EVERY 12 HOURS
Qty: 30 ML | Refills: 3 | Status: SHIPPED | OUTPATIENT
Start: 2025-08-11 | End: 2026-08-11

## 2025-08-11 RX ORDER — FLUTICASONE PROPIONATE 93 UG/1
1 SPRAY, METERED NASAL DAILY
Qty: 6.4 ML | Refills: 0 | COMMUNITY
Start: 2025-08-11

## 2025-08-11 RX ORDER — VERAPAMIL HCL 240 MG
240 TABLET, EXTENDED RELEASE ORAL DAILY
Qty: 90 TABLET | Refills: 1 | Status: SHIPPED | OUTPATIENT
Start: 2025-08-11 | End: 2025-08-17

## 2025-08-11 RX ORDER — IPRATROPIUM BROMIDE 42 UG/1
2 SPRAY, METERED NASAL 3 TIMES DAILY
Qty: 15 ML | Refills: 0 | Status: SHIPPED | OUTPATIENT
Start: 2025-08-11 | End: 2025-09-01

## 2025-08-13 ENCOUNTER — ALLIED HEALTH (OUTPATIENT)
Dept: INTEGRATIVE MEDICINE | Facility: CLINIC | Age: 30
End: 2025-08-13

## 2025-08-13 PROCEDURE — MASSG60 MASSAGE 60 MINUTES: Performed by: MASSAGE THERAPIST

## 2025-08-13 PROCEDURE — CYTAX SALES TAX CUYAHOGA COUNT: Performed by: MASSAGE THERAPIST

## 2025-08-15 ENCOUNTER — OFFICE VISIT (OUTPATIENT)
Dept: PRIMARY CARE | Facility: CLINIC | Age: 30
End: 2025-08-15
Payer: COMMERCIAL

## 2025-08-15 VITALS
WEIGHT: 221 LBS | OXYGEN SATURATION: 96 % | HEART RATE: 81 BPM | DIASTOLIC BLOOD PRESSURE: 80 MMHG | SYSTOLIC BLOOD PRESSURE: 117 MMHG | BODY MASS INDEX: 34.61 KG/M2

## 2025-08-15 DIAGNOSIS — J32.0 CHRONIC MAXILLARY SINUSITIS: ICD-10-CM

## 2025-08-15 DIAGNOSIS — G89.29 CHRONIC ELBOW PAIN, RIGHT: ICD-10-CM

## 2025-08-15 DIAGNOSIS — I10 ESSENTIAL HYPERTENSION: ICD-10-CM

## 2025-08-15 DIAGNOSIS — G43.909 MIGRAINE WITHOUT STATUS MIGRAINOSUS, NOT INTRACTABLE, UNSPECIFIED MIGRAINE TYPE: ICD-10-CM

## 2025-08-15 DIAGNOSIS — R09.81 NASAL CONGESTION: ICD-10-CM

## 2025-08-15 DIAGNOSIS — G56.31 RADIAL TUNNEL SYNDROME OF RIGHT UPPER EXTREMITY: ICD-10-CM

## 2025-08-15 DIAGNOSIS — M25.521 CHRONIC ELBOW PAIN, RIGHT: ICD-10-CM

## 2025-08-15 DIAGNOSIS — G56.32 RADIAL TUNNEL SYNDROME, LEFT: ICD-10-CM

## 2025-08-15 DIAGNOSIS — G56.21 CUBITAL TUNNEL SYNDROME ON RIGHT: Primary | ICD-10-CM

## 2025-08-15 PROCEDURE — 99214 OFFICE O/P EST MOD 30 MIN: CPT | Performed by: FAMILY MEDICINE

## 2025-08-15 PROCEDURE — 3079F DIAST BP 80-89 MM HG: CPT | Performed by: FAMILY MEDICINE

## 2025-08-15 PROCEDURE — 3074F SYST BP LT 130 MM HG: CPT | Performed by: FAMILY MEDICINE

## 2025-08-16 DIAGNOSIS — I10 ESSENTIAL HYPERTENSION: ICD-10-CM

## 2025-08-17 RX ORDER — VERAPAMIL HCL 240 MG
TABLET, EXTENDED RELEASE ORAL
Qty: 90 TABLET | Refills: 1 | Status: SHIPPED | OUTPATIENT
Start: 2025-08-17

## 2025-08-22 ENCOUNTER — APPOINTMENT (OUTPATIENT)
Dept: INTEGRATIVE MEDICINE | Facility: CLINIC | Age: 30
End: 2025-08-22
Payer: COMMERCIAL

## (undated) DEVICE — GOWN, SURGICAL, IMPLT, BACK, DISPOSABLE, XLARGE, STERILE

## (undated) DEVICE — GLOVE, SURGICAL, PROTEXIS PI , 7.5, PF, LF

## (undated) DEVICE — NEEDLE, HYPODERMIC, MONOJECT, 27 G X 1.5 IN

## (undated) DEVICE — BLADE, FUSION 4.3 X 13 ROTATABLE

## (undated) DEVICE — DRAPE, TIBURON, SPLIT SHEET, REINF ADH STRIP, 77X122

## (undated) DEVICE — TUBING, SUCTION, CONNECTING, NON-CONDUCTIVE, SURE GRIP CONNECTORS, 3/16 IN X 10 FT

## (undated) DEVICE — Device

## (undated) DEVICE — SUTURE, PLAIN, 5-0, 18 IN, PC1, YELLOW

## (undated) DEVICE — DRAPE, SHEET, FAN FOLDED, HALF, 44 X 58 IN, DISPOSABLE, LF, STERILE

## (undated) DEVICE — CONTAINER, SPECIMEN, 120ML

## (undated) DEVICE — TRACKER, INSTRUMENT

## (undated) DEVICE — DRAPE PACK, MINOR, CUSTOM, GEAUGA

## (undated) DEVICE — SOLUTION, IRRIGATION, SODIUM CHLORIDE 0.9%, 1000 ML, POUR BOTTLE

## (undated) DEVICE — SOLUTION, IRRIGATION, 0.9% SODIUM CHLORIDE, 1000 ML, HANG BOTTLE

## (undated) DEVICE — TRACKER, STINGRAY, NON-INVASIVE, AXIEM STEALTH NAVIGATION

## (undated) DEVICE — TRACKER, PATIENT

## (undated) DEVICE — DRAPE, SHEET, 17 X 23 IN

## (undated) DEVICE — PAD, EYE, OVAL, 1 5/8 X 2 5/8 IN, STERILE

## (undated) DEVICE — SUTURE, PROLENE, 2-0, 18 IN, FS, BLUE

## (undated) DEVICE — SOLUTION, IV, INJECTION, USP, 0.9% SODIUM CHL, 500ML, EXCEL PLUS

## (undated) DEVICE — BLADE, QUADCUT, 3.4MM X 13CM, ROHS

## (undated) DEVICE — KNIFE, ENT, MICROSURGICAL, INFERIOR TURBINATE, 2.9 MM

## (undated) DEVICE — SYRINGE, 3 CC, LUER LOCK

## (undated) DEVICE — SYRINGE, MONOJECT, LUER LOCK, 3 CC, LF

## (undated) DEVICE — PREP, SKIN, SWABSTICK, POVIDONE IODINE, TRIPLES